# Patient Record
Sex: FEMALE | Race: BLACK OR AFRICAN AMERICAN | NOT HISPANIC OR LATINO | Employment: OTHER | URBAN - METROPOLITAN AREA
[De-identification: names, ages, dates, MRNs, and addresses within clinical notes are randomized per-mention and may not be internally consistent; named-entity substitution may affect disease eponyms.]

---

## 2017-03-23 ENCOUNTER — HISTORICAL (OUTPATIENT)
Dept: ADMINISTRATIVE | Facility: HOSPITAL | Age: 77
End: 2017-03-23

## 2017-05-05 ENCOUNTER — HISTORICAL (OUTPATIENT)
Dept: RADIOLOGY | Facility: HOSPITAL | Age: 77
End: 2017-05-05

## 2017-05-11 ENCOUNTER — HISTORICAL (OUTPATIENT)
Dept: ADMINISTRATIVE | Facility: HOSPITAL | Age: 77
End: 2017-05-11

## 2017-05-17 ENCOUNTER — HISTORICAL (OUTPATIENT)
Dept: ADMINISTRATIVE | Facility: HOSPITAL | Age: 77
End: 2017-05-17

## 2017-05-24 ENCOUNTER — HISTORICAL (OUTPATIENT)
Dept: ADMINISTRATIVE | Facility: HOSPITAL | Age: 77
End: 2017-05-24

## 2017-06-02 ENCOUNTER — HISTORICAL (OUTPATIENT)
Dept: ADMINISTRATIVE | Facility: HOSPITAL | Age: 77
End: 2017-06-02

## 2017-06-09 ENCOUNTER — HISTORICAL (OUTPATIENT)
Dept: ADMINISTRATIVE | Facility: HOSPITAL | Age: 77
End: 2017-06-09

## 2017-06-19 ENCOUNTER — HISTORICAL (OUTPATIENT)
Dept: ADMINISTRATIVE | Facility: HOSPITAL | Age: 77
End: 2017-06-19

## 2017-06-26 ENCOUNTER — HISTORICAL (OUTPATIENT)
Dept: ADMINISTRATIVE | Facility: HOSPITAL | Age: 77
End: 2017-06-26

## 2017-07-03 ENCOUNTER — HISTORICAL (OUTPATIENT)
Dept: ADMINISTRATIVE | Facility: HOSPITAL | Age: 77
End: 2017-07-03

## 2017-07-17 ENCOUNTER — HISTORICAL (OUTPATIENT)
Dept: ADMINISTRATIVE | Facility: HOSPITAL | Age: 77
End: 2017-07-17

## 2017-07-24 ENCOUNTER — HISTORICAL (OUTPATIENT)
Dept: ADMINISTRATIVE | Facility: HOSPITAL | Age: 77
End: 2017-07-24

## 2018-02-06 ENCOUNTER — HISTORICAL (OUTPATIENT)
Dept: ADMINISTRATIVE | Facility: HOSPITAL | Age: 78
End: 2018-02-06

## 2018-03-01 ENCOUNTER — HISTORICAL (OUTPATIENT)
Dept: LAB | Facility: HOSPITAL | Age: 78
End: 2018-03-01

## 2018-03-01 LAB
ABS NEUT (OLG): 1.62 X10(3)/MCL (ref 2.1–9.2)
ALBUMIN SERPL-MCNC: 3.4 GM/DL (ref 3.4–5)
ALBUMIN/GLOB SERPL: 1 {RATIO}
ALP SERPL-CCNC: 105 UNIT/L (ref 45–117)
ALT SERPL-CCNC: 29 UNIT/L (ref 13–56)
AST SERPL-CCNC: 19 UNIT/L (ref 15–37)
BILIRUB SERPL-MCNC: 0.4 MG/DL (ref 0.2–1)
BILIRUBIN DIRECT+TOT PNL SERPL-MCNC: 0.1 MG/DL (ref 0–0.2)
BILIRUBIN DIRECT+TOT PNL SERPL-MCNC: 0.3 MG/DL (ref 0–1)
BUN SERPL-MCNC: 10 MG/DL (ref 7–18)
CALCIUM SERPL-MCNC: 8.8 MG/DL (ref 8.5–10.1)
CHLORIDE SERPL-SCNC: 106 MMOL/L (ref 98–107)
CHOLEST SERPL-MCNC: 128 MG/DL (ref 0–200)
CHOLEST/HDLC SERPL: 1.6 {RATIO} (ref 0–4)
CO2 SERPL-SCNC: 31 MMOL/L (ref 21–32)
CREAT SERPL-MCNC: 0.7 MG/DL (ref 0.55–1.02)
DEPRECATED CALCIDIOL+CALCIFEROL SERPL-MC: 36.7 NG/ML (ref 30–80)
ERYTHROCYTE [DISTWIDTH] IN BLOOD BY AUTOMATED COUNT: 13.7 % (ref 11.5–17)
EST. AVERAGE GLUCOSE BLD GHB EST-MCNC: 131 MG/DL
FT4I SERPL CALC-MCNC: 3.04 (ref 2.6–3.6)
GLOBULIN SER-MCNC: 4 GM/DL (ref 2–4)
GLUCOSE SERPL-MCNC: 101 MG/DL (ref 74–106)
HBA1C MFR BLD: 6.2 % (ref 4.2–6.3)
HCT VFR BLD AUTO: 38.5 % (ref 37–47)
HDLC SERPL-MCNC: 79 MG/DL (ref 35–60)
HGB BLD-MCNC: 12.2 GM/DL (ref 12–16)
LDLC SERPL CALC-MCNC: 39 MG/DL (ref 0–129)
MCH RBC QN AUTO: 28.2 PG (ref 27–31)
MCHC RBC AUTO-ENTMCNC: 31.7 GM/DL (ref 33–36)
MCV RBC AUTO: 88.9 FL (ref 80–94)
PLATELET # BLD AUTO: 225 X10(3)/MCL (ref 130–400)
PMV BLD AUTO: 9.1 FL (ref 7.4–10.4)
POTASSIUM SERPL-SCNC: 4.1 MMOL/L (ref 3.5–5.1)
PROT SERPL-MCNC: 7.5 GM/DL (ref 6.4–8.2)
RBC # BLD AUTO: 4.33 X10(6)/MCL (ref 4.2–5.4)
SODIUM SERPL-SCNC: 141 MMOL/L (ref 136–145)
T3RU NFR SERPL: 33 % (ref 31–39)
T4 SERPL-MCNC: 9.2 MCG/DL (ref 4.7–13.3)
TRIGL SERPL-MCNC: 51 MG/DL (ref 30–150)
TSH SERPL-ACNC: 1.35 MIU/ML (ref 0.36–3.74)
VLDLC SERPL CALC-MCNC: 10 MG/DL
WBC # SPEC AUTO: 4.1 X10(3)/MCL (ref 4.5–11.5)

## 2018-05-01 ENCOUNTER — HISTORICAL (OUTPATIENT)
Dept: RADIOLOGY | Facility: HOSPITAL | Age: 78
End: 2018-05-01

## 2018-05-01 LAB
ABS NEUT (OLG): 1.04 X10(3)/MCL (ref 2.1–9.2)
ALBUMIN SERPL-MCNC: 3.2 GM/DL (ref 3.4–5)
ALBUMIN/GLOB SERPL: 1 {RATIO}
ALP SERPL-CCNC: 109 UNIT/L (ref 45–117)
ALT SERPL-CCNC: 16 UNIT/L (ref 13–56)
AST SERPL-CCNC: 14 UNIT/L (ref 15–37)
BILIRUB SERPL-MCNC: 0.3 MG/DL (ref 0.2–1)
BILIRUBIN DIRECT+TOT PNL SERPL-MCNC: 0.1 MG/DL (ref 0–0.2)
BILIRUBIN DIRECT+TOT PNL SERPL-MCNC: 0.2 MG/DL (ref 0–1)
BUN SERPL-MCNC: 15 MG/DL (ref 7–18)
CALCIUM SERPL-MCNC: 9 MG/DL (ref 8.5–10.1)
CHLORIDE SERPL-SCNC: 104 MMOL/L (ref 98–107)
CO2 SERPL-SCNC: 33 MMOL/L (ref 21–32)
CREAT SERPL-MCNC: 0.98 MG/DL (ref 0.55–1.02)
ERYTHROCYTE [DISTWIDTH] IN BLOOD BY AUTOMATED COUNT: 14.3 % (ref 11.5–17)
GLOBULIN SER-MCNC: 4 GM/DL (ref 2–4)
GLUCOSE SERPL-MCNC: 108 MG/DL (ref 74–106)
HCT VFR BLD AUTO: 30.8 % (ref 37–47)
HGB BLD-MCNC: 9.6 GM/DL (ref 12–16)
MCH RBC QN AUTO: 28.6 PG (ref 27–31)
MCHC RBC AUTO-ENTMCNC: 31.2 GM/DL (ref 33–36)
MCV RBC AUTO: 91.7 FL (ref 80–94)
PLATELET # BLD AUTO: 191 X10(3)/MCL (ref 130–400)
PMV BLD AUTO: 8.3 FL (ref 7.4–10.4)
POTASSIUM SERPL-SCNC: 3.8 MMOL/L (ref 3.5–5.1)
PROT SERPL-MCNC: 6.9 GM/DL (ref 6.4–8.2)
RBC # BLD AUTO: 3.36 X10(6)/MCL (ref 4.2–5.4)
SODIUM SERPL-SCNC: 143 MMOL/L (ref 136–145)
WBC # SPEC AUTO: 3.4 X10(3)/MCL (ref 4.5–11.5)

## 2018-09-13 ENCOUNTER — HISTORICAL (OUTPATIENT)
Dept: LAB | Facility: HOSPITAL | Age: 78
End: 2018-09-13

## 2018-09-13 LAB
ABS NEUT (OLG): 1.48 X10(3)/MCL (ref 2.1–9.2)
ALBUMIN SERPL-MCNC: 3.1 GM/DL (ref 3.4–5)
ALBUMIN/GLOB SERPL: 1 {RATIO}
ALP SERPL-CCNC: 96 UNIT/L (ref 45–117)
ALT SERPL-CCNC: 23 UNIT/L (ref 13–56)
AST SERPL-CCNC: 19 UNIT/L (ref 15–37)
BILIRUB SERPL-MCNC: 0.4 MG/DL (ref 0.2–1)
BILIRUBIN DIRECT+TOT PNL SERPL-MCNC: <0.1 MG/DL (ref 0–0.2)
BILIRUBIN DIRECT+TOT PNL SERPL-MCNC: >0.3 MG/DL (ref 0–1)
BUN SERPL-MCNC: 16 MG/DL (ref 7–18)
CALCIUM SERPL-MCNC: 8.5 MG/DL (ref 8.5–10.1)
CHLORIDE SERPL-SCNC: 104 MMOL/L (ref 98–107)
CO2 SERPL-SCNC: 32 MMOL/L (ref 21–32)
CREAT SERPL-MCNC: 0.94 MG/DL (ref 0.55–1.02)
ERYTHROCYTE [DISTWIDTH] IN BLOOD BY AUTOMATED COUNT: 13.8 % (ref 11.5–17)
GLOBULIN SER-MCNC: 4 GM/DL (ref 2–4)
GLUCOSE SERPL-MCNC: 108 MG/DL (ref 74–106)
HCT VFR BLD AUTO: 36.3 % (ref 37–47)
HGB BLD-MCNC: 11.1 GM/DL (ref 12–16)
MCH RBC QN AUTO: 27.7 PG (ref 27–31)
MCHC RBC AUTO-ENTMCNC: 30.6 GM/DL (ref 33–36)
MCV RBC AUTO: 90.5 FL (ref 80–94)
PLATELET # BLD AUTO: 191 X10(3)/MCL (ref 130–400)
PMV BLD AUTO: 8.7 FL (ref 7.4–10.4)
POTASSIUM SERPL-SCNC: 3.7 MMOL/L (ref 3.5–5.1)
PROT SERPL-MCNC: 7 GM/DL (ref 6.4–8.2)
RBC # BLD AUTO: 4.01 X10(6)/MCL (ref 4.2–5.4)
SODIUM SERPL-SCNC: 142 MMOL/L (ref 136–145)
WBC # SPEC AUTO: 3.9 X10(3)/MCL (ref 4.5–11.5)

## 2018-09-18 ENCOUNTER — HISTORICAL (OUTPATIENT)
Dept: ENDOSCOPY | Facility: HOSPITAL | Age: 78
End: 2018-09-18

## 2020-01-19 ENCOUNTER — HISTORICAL (OUTPATIENT)
Dept: PREADMISSION TESTING | Facility: HOSPITAL | Age: 80
End: 2020-01-19

## 2020-10-07 ENCOUNTER — HISTORICAL (OUTPATIENT)
Dept: ADMINISTRATIVE | Facility: HOSPITAL | Age: 80
End: 2020-10-07

## 2020-10-07 LAB
ABS NEUT (OLG): 3.08 X10(3)/MCL (ref 2.1–9.2)
ALBUMIN SERPL-MCNC: 3.4 GM/DL (ref 3.4–4.8)
ALBUMIN/GLOB SERPL: 1 RATIO (ref 1.1–2)
ALP SERPL-CCNC: 84 UNIT/L (ref 40–150)
ALT SERPL-CCNC: 27 UNIT/L (ref 0–55)
AST SERPL-CCNC: 22 UNIT/L (ref 5–34)
BASOPHILS # BLD AUTO: 0.02 X10(3)/MCL (ref 0–0.2)
BASOPHILS NFR BLD AUTO: 0.3 % (ref 0–1)
BILIRUB SERPL-MCNC: 0.4 MG/DL (ref 0.2–1.2)
BILIRUBIN DIRECT+TOT PNL SERPL-MCNC: 0.2 MG/DL (ref 0–0.5)
BILIRUBIN DIRECT+TOT PNL SERPL-MCNC: 0.2 MG/DL (ref 0–0.8)
BUN SERPL-MCNC: 12.9 MG/DL (ref 9.8–20.1)
CALCIUM SERPL-MCNC: 9.4 MG/DL (ref 8.4–10.2)
CHLORIDE SERPL-SCNC: 99 MMOL/L (ref 98–107)
CO2 SERPL-SCNC: 31 MMOL/L (ref 23–31)
CREAT SERPL-MCNC: 0.81 MG/DL (ref 0.57–1.11)
EOSINOPHIL # BLD AUTO: 0.15 X10(3)/MCL (ref 0–0.9)
EOSINOPHIL NFR BLD AUTO: 2.5 % (ref 0–6.4)
ERYTHROCYTE [DISTWIDTH] IN BLOOD BY AUTOMATED COUNT: 14.2 % (ref 11.5–17)
GLOBULIN SER-MCNC: 3.5 GM/DL (ref 2.4–3.5)
GLUCOSE SERPL-MCNC: 105 MG/DL (ref 82–115)
HCT VFR BLD AUTO: 38.8 % (ref 37–47)
HGB BLD-MCNC: 12.1 GM/DL (ref 12–16)
IMM GRANULOCYTES # BLD AUTO: 0.01 10*3/UL (ref 0–0.02)
IMM GRANULOCYTES NFR BLD AUTO: 0.2 % (ref 0–0.43)
LYMPHOCYTES # BLD AUTO: 2.11 X10(3)/MCL (ref 0.6–4.6)
LYMPHOCYTES NFR BLD AUTO: 34.5 % (ref 16–44)
MCH RBC QN AUTO: 29 PG (ref 27–31)
MCHC RBC AUTO-ENTMCNC: 31.2 GM/DL (ref 33–36)
MCV RBC AUTO: 93 FL (ref 80–94)
MONOCYTES # BLD AUTO: 0.74 X10(3)/MCL (ref 0.1–1.3)
MONOCYTES NFR BLD AUTO: 12.1 % (ref 4–12.1)
NEUTROPHILS # BLD AUTO: 3.08 X10(3)/MCL (ref 2.1–9.2)
NEUTROPHILS NFR BLD AUTO: 50.4 % (ref 43–73)
NRBC BLD AUTO-RTO: 0 % (ref 0–0.2)
PLATELET # BLD AUTO: 218 X10(3)/MCL (ref 130–400)
PMV BLD AUTO: 9 FL (ref 7.4–10.4)
POTASSIUM SERPL-SCNC: 4.6 MMOL/L (ref 3.5–5.1)
PROT SERPL-MCNC: 6.9 GM/DL (ref 5.8–7.6)
RBC # BLD AUTO: 4.17 X10(6)/MCL (ref 4.2–5.4)
SODIUM SERPL-SCNC: 138 MMOL/L (ref 136–145)
WBC # SPEC AUTO: 6.1 X10(3)/MCL (ref 4.5–11.5)

## 2021-08-06 LAB
ABS NEUT (OLG): 2.01 X10(3)/MCL (ref 2.1–9.2)
BASOPHILS # BLD AUTO: 0 X10(3)/MCL (ref 0–0.2)
BASOPHILS NFR BLD AUTO: 0 %
EOSINOPHIL # BLD AUTO: 0.2 X10(3)/MCL (ref 0–0.9)
EOSINOPHIL NFR BLD AUTO: 4 %
ERYTHROCYTE [DISTWIDTH] IN BLOOD BY AUTOMATED COUNT: 13.8 % (ref 11.5–17)
HCT VFR BLD AUTO: 38.5 % (ref 37–47)
HGB BLD-MCNC: 11.7 GM/DL (ref 12–16)
LYMPHOCYTES # BLD AUTO: 1.7 X10(3)/MCL (ref 0.6–4.6)
LYMPHOCYTES NFR BLD AUTO: 38 %
MCH RBC QN AUTO: 28.1 PG (ref 27–31)
MCHC RBC AUTO-ENTMCNC: 30.4 GM/DL (ref 33–36)
MCV RBC AUTO: 92.5 FL (ref 80–94)
MONOCYTES # BLD AUTO: 0.6 X10(3)/MCL (ref 0.1–1.3)
MONOCYTES NFR BLD AUTO: 12 %
NEUTROPHILS # BLD AUTO: 2.01 X10(3)/MCL (ref 2.1–9.2)
NEUTROPHILS NFR BLD AUTO: 45 %
PLATELET # BLD AUTO: 289 X10(3)/MCL (ref 130–400)
PMV BLD AUTO: 9.5 FL (ref 9.4–12.4)
RBC # BLD AUTO: 4.16 X10(6)/MCL (ref 4.2–5.4)
SARS-COV-2 AG RESP QL IA.RAPID: NEGATIVE
WBC # SPEC AUTO: 4.5 X10(3)/MCL (ref 4.5–11.5)

## 2021-08-10 ENCOUNTER — HISTORICAL (OUTPATIENT)
Dept: PREADMISSION TESTING | Facility: HOSPITAL | Age: 81
End: 2021-08-10

## 2021-10-04 ENCOUNTER — HISTORICAL (OUTPATIENT)
Dept: ADMINISTRATIVE | Facility: HOSPITAL | Age: 81
End: 2021-10-04

## 2021-10-04 LAB
ABS NEUT (OLG): 1.92 X10(3)/MCL (ref 2.1–9.2)
ALBUMIN SERPL-MCNC: 3.3 GM/DL (ref 3.4–4.8)
ALBUMIN/GLOB SERPL: 1 RATIO (ref 1.1–2)
ALP SERPL-CCNC: 102 UNIT/L (ref 40–150)
ALT SERPL-CCNC: 15 UNIT/L (ref 0–55)
AST SERPL-CCNC: 16 UNIT/L (ref 5–34)
BASOPHILS # BLD AUTO: 0 X10(3)/MCL (ref 0–0.2)
BASOPHILS NFR BLD AUTO: 0 %
BILIRUB SERPL-MCNC: 0.6 MG/DL
BILIRUBIN DIRECT+TOT PNL SERPL-MCNC: 0.2 MG/DL (ref 0–0.5)
BILIRUBIN DIRECT+TOT PNL SERPL-MCNC: 0.4 MG/DL (ref 0–0.8)
BUN SERPL-MCNC: 11 MG/DL (ref 9.8–20.1)
CALCIUM SERPL-MCNC: 9 MG/DL (ref 8.4–10.2)
CHLORIDE SERPL-SCNC: 99 MMOL/L (ref 98–107)
CHOLEST SERPL-MCNC: 157 MG/DL
CHOLEST/HDLC SERPL: 3 {RATIO} (ref 0–5)
CO2 SERPL-SCNC: 31 MMOL/L (ref 23–31)
CREAT SERPL-MCNC: 0.79 MG/DL (ref 0.55–1.02)
DEPRECATED CALCIDIOL+CALCIFEROL SERPL-MC: 41.6 NG/ML (ref 30–80)
DIGOXIN SERPL-MCNC: <0.19 NG/ML (ref 0.8–2)
EOSINOPHIL # BLD AUTO: 0.1 X10(3)/MCL (ref 0–0.9)
EOSINOPHIL NFR BLD AUTO: 2 %
ERYTHROCYTE [DISTWIDTH] IN BLOOD BY AUTOMATED COUNT: 14.5 % (ref 11.5–17)
GLOBULIN SER-MCNC: 3.2 GM/DL (ref 2.4–3.5)
GLUCOSE SERPL-MCNC: 102 MG/DL (ref 82–115)
HCT VFR BLD AUTO: 35.6 % (ref 37–47)
HDLC SERPL-MCNC: 60 MG/DL (ref 35–60)
HGB BLD-MCNC: 11.3 GM/DL (ref 12–16)
INR PPP: 1.3 (ref 0–1.3)
LDLC SERPL CALC-MCNC: 82 MG/DL (ref 50–140)
LYMPHOCYTES # BLD AUTO: 1.9 X10(3)/MCL (ref 0.6–4.6)
LYMPHOCYTES NFR BLD AUTO: 43 %
MCH RBC QN AUTO: 28.8 PG (ref 27–31)
MCHC RBC AUTO-ENTMCNC: 31.7 GM/DL (ref 33–36)
MCV RBC AUTO: 90.6 FL (ref 80–94)
MONOCYTES # BLD AUTO: 0.4 X10(3)/MCL (ref 0.1–1.3)
MONOCYTES NFR BLD AUTO: 10 %
NEUTROPHILS # BLD AUTO: 1.92 X10(3)/MCL (ref 2.1–9.2)
NEUTROPHILS NFR BLD AUTO: 44 %
PLATELET # BLD AUTO: 254 X10(3)/MCL (ref 130–400)
PMV BLD AUTO: 9.5 FL (ref 9.4–12.4)
POTASSIUM SERPL-SCNC: 3.2 MMOL/L (ref 3.5–5.1)
PROT SERPL-MCNC: 6.5 GM/DL (ref 5.8–7.6)
PROTHROMBIN TIME: 15.4 SECOND(S) (ref 12.5–14.5)
RBC # BLD AUTO: 3.93 X10(6)/MCL (ref 4.2–5.4)
SODIUM SERPL-SCNC: 143 MMOL/L (ref 136–145)
TRIGL SERPL-MCNC: 77 MG/DL (ref 37–140)
TSH SERPL-ACNC: 1.4 UIU/ML (ref 0.35–4.94)
VLDLC SERPL CALC-MCNC: 15 MG/DL
WBC # SPEC AUTO: 4.4 X10(3)/MCL (ref 4.5–11.5)

## 2022-01-19 ENCOUNTER — HISTORICAL (OUTPATIENT)
Dept: PREADMISSION TESTING | Facility: HOSPITAL | Age: 82
End: 2022-01-19

## 2022-01-19 LAB
ABS NEUT (OLG): 2 X10(3)/MCL (ref 2.1–9.2)
ALBUMIN SERPL-MCNC: 3.2 GM/DL (ref 3.4–4.8)
ALBUMIN/GLOB SERPL: 1 RATIO (ref 1.1–2)
ALP SERPL-CCNC: 103 UNIT/L (ref 40–150)
ALT SERPL-CCNC: 15 UNIT/L (ref 0–55)
AST SERPL-CCNC: 16 UNIT/L (ref 5–34)
BASOPHILS # BLD AUTO: 0 X10(3)/MCL (ref 0–0.2)
BASOPHILS NFR BLD AUTO: 0 %
BILIRUB SERPL-MCNC: 0.4 MG/DL
BILIRUBIN DIRECT+TOT PNL SERPL-MCNC: 0.2 MG/DL (ref 0–0.5)
BILIRUBIN DIRECT+TOT PNL SERPL-MCNC: 0.2 MG/DL (ref 0–0.8)
BUN SERPL-MCNC: 13 MG/DL (ref 9.8–20.1)
CALCIUM SERPL-MCNC: 9.1 MG/DL (ref 8.7–10.5)
CHLORIDE SERPL-SCNC: 103 MMOL/L (ref 98–107)
CO2 SERPL-SCNC: 30 MMOL/L (ref 23–31)
CREAT SERPL-MCNC: 0.78 MG/DL (ref 0.55–1.02)
EOSINOPHIL # BLD AUTO: 0.1 X10(3)/MCL (ref 0–0.9)
EOSINOPHIL NFR BLD AUTO: 2 %
ERYTHROCYTE [DISTWIDTH] IN BLOOD BY AUTOMATED COUNT: 14.3 % (ref 11.5–17)
GLOBULIN SER-MCNC: 3.1 GM/DL (ref 2.4–3.5)
GLUCOSE SERPL-MCNC: 103 MG/DL (ref 82–115)
HCT VFR BLD AUTO: 36.2 % (ref 37–47)
HGB BLD-MCNC: 11 GM/DL (ref 12–16)
LYMPHOCYTES # BLD AUTO: 1.5 X10(3)/MCL (ref 0.6–4.6)
LYMPHOCYTES NFR BLD AUTO: 37 %
MCH RBC QN AUTO: 28.4 PG (ref 27–31)
MCHC RBC AUTO-ENTMCNC: 30.4 GM/DL (ref 33–36)
MCV RBC AUTO: 93.3 FL (ref 80–94)
MONOCYTES # BLD AUTO: 0.5 X10(3)/MCL (ref 0.1–1.3)
MONOCYTES NFR BLD AUTO: 12 %
NEUTROPHILS # BLD AUTO: 2 X10(3)/MCL (ref 2.1–9.2)
NEUTROPHILS NFR BLD AUTO: 49 %
PLATELET # BLD AUTO: 238 X10(3)/MCL (ref 130–400)
PMV BLD AUTO: 9.2 FL (ref 9.4–12.4)
POTASSIUM SERPL-SCNC: 4.6 MMOL/L (ref 3.5–5.1)
PROT SERPL-MCNC: 6.3 GM/DL (ref 5.8–7.6)
RBC # BLD AUTO: 3.88 X10(6)/MCL (ref 4.2–5.4)
SARS-COV-2 RNA RESP QL NAA+PROBE: NOT DETECTED
SODIUM SERPL-SCNC: 141 MMOL/L (ref 136–145)
WBC # SPEC AUTO: 4.1 X10(3)/MCL (ref 4.5–11.5)

## 2022-01-21 ENCOUNTER — HISTORICAL (OUTPATIENT)
Dept: SURGERY | Facility: HOSPITAL | Age: 82
End: 2022-01-21

## 2022-02-23 ENCOUNTER — HISTORICAL (OUTPATIENT)
Dept: LAB | Facility: HOSPITAL | Age: 82
End: 2022-02-23

## 2022-02-23 LAB
ABS NEUT (OLG): 2.32 (ref 2.1–9.2)
BASOPHILS # BLD AUTO: 0.02 10*3/UL (ref 0–0.2)
BASOPHILS NFR BLD AUTO: 0.4 % (ref 0–1)
EOSINOPHIL # BLD AUTO: 0.12 10*3/UL (ref 0–0.9)
EOSINOPHIL NFR BLD AUTO: 2.5 % (ref 0–6.4)
ERYTHROCYTE [DISTWIDTH] IN BLOOD BY AUTOMATED COUNT: 14.3 % (ref 11.5–17)
HCT VFR BLD AUTO: 34.8 % (ref 37–47)
HGB BLD-MCNC: 10.8 G/DL (ref 12–16)
IMM GRANULOCYTES # BLD AUTO: 0 10*3/UL (ref 0–0.02)
IMM GRANULOCYTES NFR BLD AUTO: 0 % (ref 0–0.43)
LYMPHOCYTES # BLD AUTO: 1.82 10*3/UL (ref 0.6–4.6)
LYMPHOCYTES NFR BLD AUTO: 38.4 % (ref 16–44)
MANUAL DIFF? (OHS): NO
MCH RBC QN AUTO: 28.6 PG (ref 27–31)
MCHC RBC AUTO-ENTMCNC: 31 G/DL (ref 33–36)
MCV RBC AUTO: 92.3 FL (ref 80–94)
MONOCYTES # BLD AUTO: 0.46 10*3/UL (ref 0.1–1.3)
MONOCYTES NFR BLD AUTO: 9.7 % (ref 4–12.1)
NEUTROPHILS # BLD AUTO: 2.32 10*3/UL (ref 2.1–9.2)
NEUTROPHILS NFR BLD AUTO: 49 % (ref 43–73)
NRBC BLD AUTO-RTO: 0 % (ref 0–0.2)
PLATELET # BLD AUTO: 237 10*3/UL (ref 130–400)
PMV BLD AUTO: 8.5 FL (ref 7.4–10.4)
RBC # BLD AUTO: 3.77 10*6/UL (ref 4.2–5.4)
WBC # SPEC AUTO: 4.7 10*3/UL (ref 4.5–11.5)

## 2022-04-11 ENCOUNTER — HISTORICAL (OUTPATIENT)
Dept: ADMINISTRATIVE | Facility: HOSPITAL | Age: 82
End: 2022-04-11
Payer: MEDICARE

## 2022-04-27 VITALS
BODY MASS INDEX: 44.14 KG/M2 | DIASTOLIC BLOOD PRESSURE: 85 MMHG | HEIGHT: 63 IN | WEIGHT: 249.13 LBS | SYSTOLIC BLOOD PRESSURE: 153 MMHG

## 2022-04-30 NOTE — OP NOTE
Patient:   Adriana Murphy            MRN: [id number]            FIN: [id number]               Age:   78 years     Sex:  Female     :  1940   Associated Diagnoses:   None   Author:   Miguel Palomo MD      Operative Note   Operative Information   Date/ Time:  2018 10:26:00.     Procedures Performed: EGD with biopsy.     Preoperative Diagnosis: nausea, heartburn, bloating.     Postoperative Diagnosis: 1.  Hiatal hernia, small, otherwise normal exam  2.  CLOtest was done.  No bloating and nausea..     Surgeon: Miguel Palomo MD.     Assistant: GI staff.     Anesthesia: per anaesthesia service.     Speciman Removed: none.     Esimated blood loss: loss  3  cc.     Description of Procedure/Findings/    Complications: History and physical as per pre-operative note. Informed consent was obtained. Patient was placed in left lateral position. Sedation per anaesthesia service. Olympus video gastroscope was introduced into the oral cavity and esophagus was intubated under direct visuaization. The scope was carefully advanced to the distal duodenum. The patient tolerated the procedure well without any complications. .     Findings: esophagus: Normal mucosa.  GE junction at approximately 35 cm.  Small hiatal hernia.  Stomach: Fundus, cardia, body and antrum appeared normal.  CLOtest was done in view of history of nausea and bloating.  Duodenum: Duodenal bulb, 2nd and 3rd portion of the duodenum appeared unremarkable to the extent of exam..     Complications: None.     recommendations:  1.  Follow biopsy results.  2.  Antireflux measures.  3.  H2 blockers or PPI as needed.  4.  Colonoscopy to follow    c.c.;  Dr. Jelani Birmingham.  Dr. Anil Fernandez.

## 2022-04-30 NOTE — OP NOTE
Patient:   Adriana Murphy            MRN: [id number]            FIN: [id number]               Age:   78 years     Sex:  Female     :  1940   Associated Diagnoses:   None   Author:   Miguel Palomo MD      Operative Note   Operative Information   Date/ Time:  2018 11:04:00.     Procedures Performed: Colonoscopy, diagnostic.     Preoperative Diagnosis: history of rectal bleeding..     Postoperative Diagnosis: internal hemorrhoids, grade 1-2.  Otherwise normal exam.  Preparation was fair.     Surgeon: Miguel Palomo MD.     Assistant: GI staff.     Anesthesia: per anaesthesia service.     Esimated blood loss: No blood loss.     Description of Procedure/Findings/    Complications: History and physical as per pre-operative note. Informed consent was obtained. Patient was placed in left lateral position. Sedation per anaesthesia service. Rectal exam was unremarkable. Olympus video colonoscope was introduced into the rectum and was carefully advanced to the cecum. The quality of the preparation was fair. Periodic irrigation was done throughout the procedure for better visualization. Patient tolerated the procedure well without any complications..     Findings: cecum, ascending colon, transverse colon, descending colon, sigmoid colon and rectum appeared unremarkable except grade 1-2 internal hemorrhoids noted on withdrawal of the scope.  Periodic irrigation was done throughout the procedure for better visualization.  Estimated withdrawal time from cecum to rectum was more than 12 minutes..     Complications: None.     recommendations:  1.  Diet as tolerated.  2.  Can resume Plavix from GI standpoint.  3.  Consider surgery evaluation for hemorrhoids if rectal bleeding recurs.    c.c.: Dr. Jelani Fernandez.

## 2022-05-14 NOTE — OP NOTE
DATE OF SURGERY:    01/21/2022    SURGEON:  Mick Frias MD    PREOPERATIVE DIAGNOSIS:  Postmenopausal bleeding.    POSTOPERATIVE DIAGNOSIS:  Postmenopausal bleeding.    ASSISTANT:  None.    PROCEDURE:  Hysteroscopy, D and C, with the MyoSure device.    FINDINGS:  Fluffy irregular endometrium, irregular contour of the uterus, unable to visualize bilateral ostia.    INDICATIONS FOR PROCEDURE:  Patient is a 81-year-old female who presented to the office for postmenopausal bleeding.  Had a negative endometrial biopsy, but a significantly thickened endometrial stripe of over 2 cm.  Risks and benefits of further sampling were discussed with her as she continued to have daily bleeding, and she desired to move forward with the procedure.    PROCEDURE IN DETAIL:  Patient was taken to the operating room with IV fluids running.  She was placed in a dorsal lithotomy position after managed anesthesia care was administered.  She was prepped and draped in the usual sterile fashion.  A single-tooth tenaculum was then placed on the anterior lip of the cervix.  The uterus was sounded to about 7 cm.  The cervix was serially dilated with Hegar dilators.  Next, the MyoSure device was then inserted and the above-mentioned findings noted.  The MyoSure device was then used to remove endometrial tissue.  This was then removed.  There was possibly a malfunction of the MyoSure device as deficit was recorded as 400, but the device was only used for about 30 seconds, so this was unlikely.  The patient remained stable throughout the case.  I did recheck the endometrial cavity to make sure there were no obvious defects with the hysteroscope.  After this, I did just a D and C, and that     tissue will be sent off to the pathologist.  The patient tolerated the procedure well.  All sponge, needle, lap counts were correct x2.  Estimated blood loss 5 cc.        ______________________________  Mick Frias MD    /US  DD:  01/21/2022   Time:  07:46AM  DT:  01/21/2022  Time:  07:59AM  Job #:  [id number]

## 2022-08-17 ENCOUNTER — HOSPITAL ENCOUNTER (EMERGENCY)
Facility: HOSPITAL | Age: 82
Discharge: HOME | End: 2022-08-17
Attending: EMERGENCY MEDICINE
Payer: MEDICARE

## 2022-08-17 VITALS
SYSTOLIC BLOOD PRESSURE: 132 MMHG | RESPIRATION RATE: 18 BRPM | OXYGEN SATURATION: 98 % | DIASTOLIC BLOOD PRESSURE: 75 MMHG | WEIGHT: 276 LBS | HEART RATE: 68 BPM | TEMPERATURE: 99 F | HEIGHT: 64 IN | BODY MASS INDEX: 47.12 KG/M2

## 2022-08-17 DIAGNOSIS — N95.0 POSTMENOPAUSAL BLEEDING: ICD-10-CM

## 2022-08-17 DIAGNOSIS — N93.8 DYSFUNCTIONAL UTERINE BLEEDING: Primary | ICD-10-CM

## 2022-08-17 LAB
ALBUMIN SERPL-MCNC: 3.2 GM/DL (ref 3.4–4.8)
ALBUMIN/GLOB SERPL: 0.8 RATIO (ref 1.1–2)
ALP SERPL-CCNC: 104 UNIT/L (ref 40–150)
ALT SERPL-CCNC: 16 UNIT/L (ref 0–55)
AST SERPL-CCNC: 17 UNIT/L (ref 5–34)
BASOPHILS # BLD AUTO: 0.02 X10(3)/MCL (ref 0–0.2)
BASOPHILS NFR BLD AUTO: 0.4 %
BILIRUBIN DIRECT+TOT PNL SERPL-MCNC: 0.3 MG/DL
BUN SERPL-MCNC: 11.5 MG/DL (ref 9.8–20.1)
CALCIUM SERPL-MCNC: 9.2 MG/DL (ref 8.4–10.2)
CHLORIDE SERPL-SCNC: 104 MMOL/L (ref 98–107)
CO2 SERPL-SCNC: 28 MMOL/L (ref 23–31)
CREAT SERPL-MCNC: 0.8 MG/DL (ref 0.55–1.02)
EOSINOPHIL # BLD AUTO: 0.15 X10(3)/MCL (ref 0–0.9)
EOSINOPHIL NFR BLD AUTO: 3 %
ERYTHROCYTE [DISTWIDTH] IN BLOOD BY AUTOMATED COUNT: 14.2 % (ref 11.5–17)
GFR SERPLBLD CREATININE-BSD FMLA CKD-EPI: >60 MLS/MIN/1.73/M2
GLOBULIN SER-MCNC: 3.8 GM/DL (ref 2.4–3.5)
GLUCOSE SERPL-MCNC: 108 MG/DL (ref 82–115)
HCT VFR BLD AUTO: 36.9 % (ref 37–47)
HGB BLD-MCNC: 10.9 GM/DL (ref 12–16)
IMM GRANULOCYTES # BLD AUTO: 0.01 X10(3)/MCL (ref 0–0.04)
IMM GRANULOCYTES NFR BLD AUTO: 0.2 %
LYMPHOCYTES # BLD AUTO: 1.84 X10(3)/MCL (ref 0.6–4.6)
LYMPHOCYTES NFR BLD AUTO: 36.9 %
MCH RBC QN AUTO: 27.3 PG (ref 27–31)
MCHC RBC AUTO-ENTMCNC: 29.5 MG/DL (ref 33–36)
MCV RBC AUTO: 92.5 FL (ref 80–94)
MONOCYTES # BLD AUTO: 0.56 X10(3)/MCL (ref 0.1–1.3)
MONOCYTES NFR BLD AUTO: 11.2 %
NEUTROPHILS # BLD AUTO: 2.4 X10(3)/MCL (ref 2.1–9.2)
NEUTROPHILS NFR BLD AUTO: 48.3 %
NRBC BLD AUTO-RTO: 0 %
PLATELET # BLD AUTO: 222 X10(3)/MCL (ref 130–400)
PMV BLD AUTO: 9.7 FL (ref 7.4–10.4)
POTASSIUM SERPL-SCNC: 3.5 MMOL/L (ref 3.5–5.1)
PROT SERPL-MCNC: 7 GM/DL (ref 5.8–7.6)
RBC # BLD AUTO: 3.99 X10(6)/MCL (ref 4.2–5.4)
SODIUM SERPL-SCNC: 141 MMOL/L (ref 136–145)
WBC # SPEC AUTO: 5 X10(3)/MCL (ref 4.5–11.5)

## 2022-08-17 PROCEDURE — 96375 TX/PRO/DX INJ NEW DRUG ADDON: CPT

## 2022-08-17 PROCEDURE — 63600175 PHARM REV CODE 636 W HCPCS: Performed by: EMERGENCY MEDICINE

## 2022-08-17 PROCEDURE — 85025 COMPLETE CBC W/AUTO DIFF WBC: CPT | Performed by: EMERGENCY MEDICINE

## 2022-08-17 PROCEDURE — 80053 COMPREHEN METABOLIC PANEL: CPT | Performed by: EMERGENCY MEDICINE

## 2022-08-17 PROCEDURE — 99284 EMERGENCY DEPT VISIT MOD MDM: CPT | Mod: 25

## 2022-08-17 PROCEDURE — 36415 COLL VENOUS BLD VENIPUNCTURE: CPT | Performed by: EMERGENCY MEDICINE

## 2022-08-17 PROCEDURE — 96374 THER/PROPH/DIAG INJ IV PUSH: CPT

## 2022-08-17 PROCEDURE — 25000003 PHARM REV CODE 250: Performed by: EMERGENCY MEDICINE

## 2022-08-17 RX ORDER — ONDANSETRON 2 MG/ML
INJECTION INTRAMUSCULAR; INTRAVENOUS
Status: DISCONTINUED
Start: 2022-08-17 | End: 2022-08-17 | Stop reason: HOSPADM

## 2022-08-17 RX ORDER — ONDANSETRON 2 MG/ML
4 INJECTION INTRAMUSCULAR; INTRAVENOUS
Status: COMPLETED | OUTPATIENT
Start: 2022-08-17 | End: 2022-08-17

## 2022-08-17 RX ORDER — MEDROXYPROGESTERONE ACETATE 10 MG/1
5 TABLET ORAL DAILY
Qty: 15 TABLET | Refills: 2 | Status: ON HOLD | OUTPATIENT
Start: 2022-08-17 | End: 2022-11-16 | Stop reason: HOSPADM

## 2022-08-17 RX ORDER — MEDROXYPROGESTERONE ACETATE 2.5 MG/1
5 TABLET ORAL
Status: COMPLETED | OUTPATIENT
Start: 2022-08-17 | End: 2022-08-17

## 2022-08-17 RX ORDER — KETOROLAC TROMETHAMINE 30 MG/ML
15 INJECTION, SOLUTION INTRAMUSCULAR; INTRAVENOUS
Status: COMPLETED | OUTPATIENT
Start: 2022-08-17 | End: 2022-08-17

## 2022-08-17 RX ADMIN — KETOROLAC TROMETHAMINE 15 MG: 30 INJECTION, SOLUTION INTRAMUSCULAR at 08:08

## 2022-08-17 RX ADMIN — MEDROXYPROGESTERONE ACETATE 5 MG: 2.5 TABLET ORAL at 08:08

## 2022-08-17 RX ADMIN — ONDANSETRON 4 MG: 2 INJECTION INTRAMUSCULAR; INTRAVENOUS at 09:08

## 2022-08-17 NOTE — ED PROVIDER NOTES
Encounter Date: 8/17/2022       History     Chief Complaint   Patient presents with    Vaginal Bleeding     Presents with intermittent vaginal bleeding x1 year. Worse over the past 4 days. Pt states that she has to change underwear every 2-3 hours over the past few days. C/O right flank pain     82-year-old female presents to the emergency department for evaluation of recurrent vaginal bleeding.  States this has been on ongoing issue for over a year.  She has previously undergone a D&C with Dr. Odell Frias January and this year, was placed on Provera and had a temporary alleviation in the beginning; however, has had intermittent episodes since that time.  She is on Plavix for some cardiac and peripheral stents.  She was previously on Eliquis but this was stopped after an IVC filter was placed in April.  She reports that the bleeding became heavy again about 3 or 4 days ago and has been passing clots at home.  She denies any chest pain or shortness of breath.  Denies any lightheadedness, dizziness, or fainting.     The history is provided by the patient.   Vaginal Bleeding  This is a recurrent problem. The current episode started more than 2 days ago. The problem occurs hourly. Pertinent negatives include no chest pain, no abdominal pain and no shortness of breath. Nothing aggravates the symptoms. Nothing relieves the symptoms.     Review of patient's allergies indicates:  Not on File  Past Medical History:   Diagnosis Date    PAD (peripheral artery disease)     Post-menopausal bleeding     Pulmonary embolus      Past Surgical History:   Procedure Laterality Date    DILATION AND CURETTAGE OF UTERUS      ivc filter       History reviewed. No pertinent family history.     Review of Systems   Constitutional: Negative for fatigue and fever.   Respiratory: Negative for shortness of breath.    Cardiovascular: Negative for chest pain and palpitations.   Gastrointestinal: Negative for abdominal pain, nausea and  vomiting.   Genitourinary: Positive for vaginal bleeding.   Neurological: Negative for weakness and light-headedness.   All other systems reviewed and are negative.      Physical Exam     Initial Vitals [08/17/22 0459]   BP Pulse Resp Temp SpO2   (!) 160/72 92 16 98.6 °F (37 °C) 99 %      MAP       --         Physical Exam    Nursing note and vitals reviewed.  Constitutional: She appears well-developed and well-nourished. She is not diaphoretic. No distress.   HENT:   Head: Normocephalic.   Mouth/Throat: Oropharynx is clear and moist.   Eyes: Conjunctivae are normal. Pupils are equal, round, and reactive to light.   Neck:   Normal range of motion.  Cardiovascular: Normal rate, regular rhythm and normal heart sounds.   Pulmonary/Chest: Breath sounds normal. No respiratory distress.   Abdominal: Abdomen is soft. She exhibits no distension. There is no abdominal tenderness.   Genitourinary:    Genitourinary Comments: Small amount of BRB in vaginal vault, no clots     Musculoskeletal:      Cervical back: Normal range of motion.     Neurological: She is alert and oriented to person, place, and time. GCS score is 15. GCS eye subscore is 4. GCS verbal subscore is 5. GCS motor subscore is 6.   Skin: Skin is warm and dry.         ED Course   Procedures  Labs Reviewed   COMPREHENSIVE METABOLIC PANEL - Abnormal; Notable for the following components:       Result Value    Albumin Level 3.2 (*)     Globulin 3.8 (*)     Albumin/Globulin Ratio 0.8 (*)     All other components within normal limits   CBC WITH DIFFERENTIAL - Abnormal; Notable for the following components:    RBC 3.99 (*)     Hgb 10.9 (*)     Hct 36.9 (*)     MCHC 29.5 (*)     All other components within normal limits   CBC W/ AUTO DIFFERENTIAL    Narrative:     The following orders were created for panel order CBC auto differential.  Procedure                               Abnormality         Status                     ---------                                -----------         ------                     CBC with Differential[[id number]]        Abnormal            Final result                 Please view results for these tests on the individual orders.   EXTRA TUBES    Narrative:     The following orders were created for panel order EXTRA TUBES.  Procedure                               Abnormality         Status                     ---------                               -----------         ------                     Light Blue Top Hold[[id number]]                              In process                   Please view results for these tests on the individual orders.   LIGHT BLUE TOP HOLD          Imaging Results    None          Medications   ketorolac injection 15 mg (15 mg Intravenous Given 8/17/22 0800)   medroxyPROGESTERone tablet 5 mg (5 mg Oral Given 8/17/22 0815)   ondansetron injection 4 mg (4 mg Intravenous Given 8/17/22 0915)     Medical Decision Making:   Initial Assessment:   Mrs. Murphy has a history of postmenopausal bleeding, previous procedure about 7 months ago but recurrent and heavy for the last several days.  Hemodynamically stable.  Small amount of bright red blood in vaginal vault on my examination but she reports that she has been passing clots including in the restroom just prior to my examination.  Will check labs and discuss with her OBGYN.  Differential Diagnosis:   Post menopausal bleeding, acute blood loss anemia, antiplatelet use  Clinical Tests:   Lab Tests: Ordered and Reviewed  ED Management:  H&H at her baseline and bleeding mild-moderate at this time. Discussed with Dr. Frias who advised restarting Provera 5 mg daily (with refills) and have her call the clinic today to arrange follow up for further evaluation and management. Plan communicated to patient who verbalized understanding and agreement. ED return precautions reviewed at the bedside and provided in the written discharge instructions. All questions answered to the best of my  ability.                ED Course as of 08/18/22 1610   Wed Aug 17, 2022   6217 Dr. Frias paged to discuss [KS]   0750 Discussed with Dr. Frias who advised starting Provera 5 mg daily, fill 30 days w/ 3 refills, and have the patient call the clinic today to arrange follow up. States he will likely get an outpatient US and consult IR for possible uterine artery ablation.  [KS]      ED Course User Index  [KS] Nessa Hoskins MD             Clinical Impression:   Final diagnoses:  [N93.8] Dysfunctional uterine bleeding (Primary)  [N95.0] Postmenopausal bleeding          ED Disposition Condition    Discharge Stable        ED Prescriptions     Medication Sig Dispense Start Date End Date Auth. Provider    medroxyPROGESTERone (PROVERA) 10 MG tablet Take 0.5 tablets (5 mg total) by mouth once daily. 15 tablet 8/17/2022 11/15/2022 Nessa Hoskins MD        Follow-up Information     Follow up With Specialties Details Why Contact Info    Mick Frias MD Obstetrics and Gynecology  call today to schedule follow up [address] [address]  [phone]      [facility] - Emergency Dept Emergency Medicine   [address] [facility] [id number] [phone]           Nessa Hoskins MD  08/18/22 0071

## 2022-08-22 ENCOUNTER — TELEPHONE (OUTPATIENT)
Dept: GYNECOLOGIC ONCOLOGY | Facility: CLINIC | Age: 82
End: 2022-08-22
Payer: MEDICARE

## 2022-08-22 DIAGNOSIS — N95.0 POST-MENOPAUSAL BLEEDING: Primary | ICD-10-CM

## 2022-08-22 NOTE — NURSING
New referral received from Dr Mick Frias for second opinion on treatment options for pt with PMB with negative EMB. Pt called and name and  verified. Explained my role as nurse navigator and direct number provided. Pt scheduled to see Dr Bradford in the next available appointment. Pt declined sooner appointments at other locations. Patient encouraged to call for any questions or concerns about her care or appointments. Pt verbalized understanding. Date time and location of appointment reviewed with pt. Appointment letter mailed to pt.     Oncology Navigation   Intake  Cancer Type: Gynecologic (PMB, with negative EMB, second opinion on tx plan)  Internal / External Referral: External  Referral Source: Dr Mick Frias  Date of Referral: 2022  Initial Nurse Navigator Contact: 2022  Referral to Initial Contact Timeline (days): 0  Date Worked: 2022  First Appointment Available: 2022  Appointment Date: 2022  First Available Date vs. Scheduled Date (days): 20  Reason if booked > 7 days after scheduling: Transportation coordination; Specific provider / access; Patient request; Other  Other reason booked > 7 days: pt requests [place] location adn declined sooner appointments in [facility]     Treatment   Procedures: Biopsy  Biopsy Schedule Date: 2022 (EMB, negative pathology)         Acuity      Follow Up  No follow-ups on file.

## 2022-09-12 ENCOUNTER — OFFICE VISIT (OUTPATIENT)
Dept: GYNECOLOGIC ONCOLOGY | Facility: CLINIC | Age: 82
End: 2022-09-12
Payer: MEDICARE

## 2022-09-12 VITALS
HEIGHT: 65 IN | DIASTOLIC BLOOD PRESSURE: 69 MMHG | SYSTOLIC BLOOD PRESSURE: 153 MMHG | WEIGHT: 265.75 LBS | BODY MASS INDEX: 44.28 KG/M2 | HEART RATE: 71 BPM

## 2022-09-12 DIAGNOSIS — N95.0 POST-MENOPAUSAL BLEEDING: ICD-10-CM

## 2022-09-12 PROCEDURE — 99205 OFFICE O/P NEW HI 60 MIN: CPT | Mod: S$GLB,,, | Performed by: OBSTETRICS & GYNECOLOGY

## 2022-09-12 PROCEDURE — 99205 PR OFFICE/OUTPT VISIT, NEW, LEVL V, 60-74 MIN: ICD-10-PCS | Mod: S$GLB,,, | Performed by: OBSTETRICS & GYNECOLOGY

## 2022-09-12 RX ORDER — PANTOPRAZOLE SODIUM 40 MG/1
40 TABLET, DELAYED RELEASE ORAL 2 TIMES DAILY
COMMUNITY
Start: 2022-09-06

## 2022-09-12 RX ORDER — AA/PROT/LYSINE/METHIO/VIT C/B6 50-12.5 MG
TABLET ORAL
COMMUNITY

## 2022-09-12 RX ORDER — LOSARTAN POTASSIUM 50 MG/1
50 TABLET ORAL DAILY
COMMUNITY
Start: 2022-06-21

## 2022-09-12 RX ORDER — GUAIFENESIN 1200 MG
325 TABLET, EXTENDED RELEASE 12 HR ORAL
COMMUNITY
Start: 2022-01-21 | End: 2023-07-20

## 2022-09-12 RX ORDER — POTASSIUM CHLORIDE 1500 MG/1
20 TABLET, EXTENDED RELEASE ORAL DAILY
COMMUNITY
Start: 2022-09-06

## 2022-09-12 RX ORDER — ASPIRIN 325 MG
50000 TABLET, DELAYED RELEASE (ENTERIC COATED) ORAL
COMMUNITY
Start: 2022-07-13

## 2022-09-12 RX ORDER — CLOPIDOGREL BISULFATE 75 MG/1
75 TABLET ORAL DAILY
COMMUNITY
Start: 2022-05-12

## 2022-09-12 RX ORDER — FUROSEMIDE 20 MG/1
20 TABLET ORAL 2 TIMES DAILY
COMMUNITY

## 2022-09-12 RX ORDER — ONDANSETRON 4 MG/1
4 TABLET, ORALLY DISINTEGRATING ORAL EVERY 8 HOURS PRN
COMMUNITY
Start: 2022-08-26

## 2022-09-12 RX ORDER — HYDROCHLOROTHIAZIDE 25 MG/1
25 TABLET ORAL DAILY
COMMUNITY
Start: 2022-07-19

## 2022-09-12 RX ORDER — TRAMADOL HYDROCHLORIDE 50 MG/1
50 TABLET ORAL EVERY 6 HOURS PRN
COMMUNITY
End: 2023-10-12

## 2022-09-12 RX ORDER — FOLIC ACID 1 MG/1
1000 TABLET ORAL DAILY
COMMUNITY
Start: 2022-08-30

## 2022-09-12 RX ORDER — LEVOTHYROXINE SODIUM 75 UG/1
75 TABLET ORAL DAILY
COMMUNITY
Start: 2022-07-01

## 2022-09-12 NOTE — PROGRESS NOTES
Subjective:      Patient ID: Adriana Murphy is a 82 y.o. female.    Chief Complaint: Consult      HPI  Here today at the request of Dr. Frias for continued PMB in the face of benign pathology.  Has a long-standing h/o AUB dating back to 2009 with another provider. Her medical comorbid conditions are significant, including severe PVD s/p stents in bilateral LE and chronic Eliquis and Plavix use.  She has been periodically on Provera daily, and was seen in January and had [facility] D&C with polyp and benign endometrium on path.  Because of continued bleeding requiring ED visit in August, she underwent repeat US in August demonstrating a 9 cm uterus with 2 cm stripe.  EMB in office was negative but had minimal endometrial tissue.  Patient desires definitive management.  Feels like menopause was in her 60's.  PSH BTL, Appendectomy, lap dwight, and vascular stent placement.  FH negative for breast/colon/gyn cancers.  Review of Systems   Constitutional:  Negative for activity change, appetite change, chills, fatigue and fever.   HENT:  Negative for hearing loss, mouth sores, nosebleeds, sore throat and tinnitus.    Eyes:  Negative for visual disturbance.   Respiratory:  Negative for cough, chest tightness, shortness of breath and wheezing.    Cardiovascular:  Negative for chest pain and leg swelling.   Gastrointestinal:  Negative for abdominal distention, abdominal pain, blood in stool, constipation, diarrhea, nausea and vomiting.   Genitourinary:  Positive for vaginal bleeding. Negative for dysuria, flank pain, frequency, hematuria, pelvic pain, vaginal discharge and vaginal pain.   Musculoskeletal:  Negative for arthralgias and back pain.   Skin:  Negative for rash.   Neurological:  Negative for dizziness, seizures, syncope, weakness and numbness.   Hematological:  Does not bruise/bleed easily.   Psychiatric/Behavioral:  Negative for confusion and sleep disturbance. The patient is not nervous/anxious.      Past Medical  History:   Diagnosis Date    PAD (peripheral artery disease)     Post-menopausal bleeding     Pulmonary embolus      Past Surgical History:   Procedure Laterality Date    DILATION AND CURETTAGE OF UTERUS      ivc filter       History reviewed. No pertinent family history.  Social History     Socioeconomic History    Marital status:    Tobacco Use    Smoking status: Never    Smokeless tobacco: Never     Current Outpatient Medications   Medication Sig    acetaminophen 325 mg Cap Take 325 mg by mouth.    cholecalciferol, vitamin D3, 1,250 mcg (50,000 unit) capsule Take 50,000 Units by mouth every 7 days.    clopidogreL (PLAVIX) 75 mg tablet Take 75 mg by mouth once daily.    EUTHYROX 75 mcg tablet Take 75 mcg by mouth once daily.    folic acid (FOLVITE) 1 MG tablet Take 1,000 mcg by mouth once daily.    hydroCHLOROthiazide (HYDRODIURIL) 25 MG tablet Take 25 mg by mouth once daily.    losartan (COZAAR) 50 MG tablet Take 50 mg by mouth once daily.    medroxyPROGESTERone (PROVERA) 10 MG tablet Take 0.5 tablets (5 mg total) by mouth once daily.    ondansetron (ZOFRAN-ODT) 4 MG TbDL Take 4 mg by mouth every 8 (eight) hours as needed.    pantoprazole (PROTONIX) 40 MG tablet Take 40 mg by mouth 2 (two) times daily.    potassium chloride (K-TAB) 20 mEq Take 20 mEq by mouth once daily.     No current facility-administered medications for this visit.     Review of patient's allergies indicates:   Allergen Reactions    Cefazolin Rash     Other reaction(s): BLISTERS    Ibuprofen Rash    Aspirin      Other reaction(s): Rectal bleeding, Stomach pain    Chocolate flavor     Morphine     Propoxyphene Hallucinations    Sulfa (sulfonamide antibiotics)     Iodine Rash       Objective:   Physical Exam:   Constitutional: She is oriented to person, place, and time. She appears well-developed and well-nourished. No distress.    HENT:   Head: Normocephalic and atraumatic.    Eyes: No scleral icterus.     Cardiovascular:  Normal rate  and intact distal pulses.      Exam reveals no cyanosis and no edema.        Pulmonary/Chest: Effort normal. No respiratory distress. She exhibits no tenderness.        Abdominal: Soft. She exhibits no distension, no fluid wave and no mass. There is no abdominal tenderness. There is no rigidity, no rebound and no guarding. No hernia.     Genitourinary:    Vagina, uterus and rectum normal.      Pelvic exam was performed with patient supine.   Labial bartholins normal.There is no rash, tenderness or lesion on the right labia. There is no rash, tenderness or lesion on the left labia. Cervix is normal. Right adnexum displays no mass, no tenderness and no fullness. Left adnexum displays no mass, no tenderness and no fullness. No  no vaginal discharge or bleeding in the vagina. Uterus is not deviated, not enlarged, not fixed, not tender, not hosting fibroids and no uterine prolapse. Uterus size: 9 cm.          Musculoskeletal: Normal range of motion and moves all extremeties. No edema.      Lymphadenopathy:     She has no cervical adenopathy.    Neurological: She is alert and oriented to person, place, and time.    Skin: Skin is warm. No rash noted. No cyanosis or erythema.    Psychiatric: She has a normal mood and affect. Thought content normal.     Assessment:     1. Post-menopausal bleeding        Plan:       Counseled patient on current symptoms and path.  Feel her continued bleeding is related to ehr AC and anti-platelet therapy, and that ost of what is in her uterus on US is mucous or blood or both.  Having said that, her persistent bleeding is a problem that has resulted in multiple evals and ED visits as well as minor surgical intervention.  I feel we are headed to hysterectomy at some point, and I therefore counseled her on such.  Will discuss her case with Dr. Fernandez, her cardiologist so as to determine need for bridging therapy surrounding surgery.  Will need to be done in [place] given vascular history.  She and  her  voiced understanding.  Will be in touch with plan.

## 2022-09-26 ENCOUNTER — TELEPHONE (OUTPATIENT)
Dept: GYNECOLOGIC ONCOLOGY | Facility: CLINIC | Age: 82
End: 2022-09-26
Payer: MEDICARE

## 2022-09-26 NOTE — TELEPHONE ENCOUNTER
Pt calling to see if a surgical date has been determined for her procedure that was discussed with Dr rBadford. Pt updated that Dr Bradford office would contact her once he has narrowed down a date. Pt states she is anxiously awaiting that call.

## 2022-10-20 NOTE — TELEPHONE ENCOUNTER
Cardiac surgical clearance and anti-coagulation recommendations received from Dr Fernandez and scanned into pt chart under media.

## 2022-10-27 ENCOUNTER — TELEPHONE (OUTPATIENT)
Dept: GYNECOLOGIC ONCOLOGY | Facility: CLINIC | Age: 82
End: 2022-10-27
Payer: MEDICARE

## 2022-10-27 ENCOUNTER — LAB VISIT (OUTPATIENT)
Dept: LAB | Facility: HOSPITAL | Age: 82
End: 2022-10-27
Attending: INTERNAL MEDICINE
Payer: MEDICARE

## 2022-10-27 DIAGNOSIS — K21.9 GASTROESOPHAGEAL REFLUX DISEASE, UNSPECIFIED WHETHER ESOPHAGITIS PRESENT: ICD-10-CM

## 2022-10-27 DIAGNOSIS — E55.9 AVITAMINOSIS D: ICD-10-CM

## 2022-10-27 DIAGNOSIS — R00.2 PALPITATIONS: ICD-10-CM

## 2022-10-27 DIAGNOSIS — R07.89 OTHER CHEST PAIN: ICD-10-CM

## 2022-10-27 DIAGNOSIS — I20.89 ANGINA DECUBITUS: Primary | ICD-10-CM

## 2022-10-27 DIAGNOSIS — I25.10 CORONARY ATHEROSCLEROSIS OF NATIVE CORONARY ARTERY: ICD-10-CM

## 2022-10-27 DIAGNOSIS — R60.9 EDEMA, UNSPECIFIED TYPE: ICD-10-CM

## 2022-10-27 DIAGNOSIS — R06.02 SHORTNESS OF BREATH: ICD-10-CM

## 2022-10-27 DIAGNOSIS — J44.89 OBSTRUCTIVE CHRONIC BRONCHITIS WITHOUT EXACERBATION: ICD-10-CM

## 2022-10-27 DIAGNOSIS — N95.0 POST-MENOPAUSAL BLEEDING: Primary | ICD-10-CM

## 2022-10-27 DIAGNOSIS — R53.81 DEBILITY: ICD-10-CM

## 2022-10-27 LAB
ALBUMIN SERPL-MCNC: 3.4 GM/DL (ref 3.4–4.8)
ALBUMIN/GLOB SERPL: 1 RATIO (ref 1.1–2)
ALP SERPL-CCNC: 103 UNIT/L (ref 40–150)
ALT SERPL-CCNC: 13 UNIT/L (ref 0–55)
AST SERPL-CCNC: 15 UNIT/L (ref 5–34)
BASOPHILS # BLD AUTO: 0.01 X10(3)/MCL (ref 0–0.2)
BASOPHILS NFR BLD AUTO: 0.2 %
BILIRUBIN DIRECT+TOT PNL SERPL-MCNC: 0.6 MG/DL
BUN SERPL-MCNC: 13.7 MG/DL (ref 9.8–20.1)
CALCIUM SERPL-MCNC: 9.3 MG/DL (ref 8.4–10.2)
CHLORIDE SERPL-SCNC: 107 MMOL/L (ref 98–107)
CHOLEST SERPL-MCNC: 166 MG/DL
CHOLEST/HDLC SERPL: 3 {RATIO} (ref 0–5)
CO2 SERPL-SCNC: 26 MMOL/L (ref 23–31)
CREAT SERPL-MCNC: 0.92 MG/DL (ref 0.55–1.02)
EOSINOPHIL # BLD AUTO: 0.12 X10(3)/MCL (ref 0–0.9)
EOSINOPHIL NFR BLD AUTO: 2.8 %
ERYTHROCYTE [DISTWIDTH] IN BLOOD BY AUTOMATED COUNT: 13.6 % (ref 11.5–17)
GFR SERPLBLD CREATININE-BSD FMLA CKD-EPI: >60 MLS/MIN/1.73/M2
GLOBULIN SER-MCNC: 3.3 GM/DL (ref 2.4–3.5)
GLUCOSE SERPL-MCNC: 105 MG/DL (ref 82–115)
HCT VFR BLD AUTO: 37.9 % (ref 37–47)
HDLC SERPL-MCNC: 54 MG/DL (ref 35–60)
HGB BLD-MCNC: 11.4 GM/DL (ref 12–16)
IMM GRANULOCYTES # BLD AUTO: 0.01 X10(3)/MCL (ref 0–0.04)
IMM GRANULOCYTES NFR BLD AUTO: 0.2 %
LDLC SERPL CALC-MCNC: 99 MG/DL (ref 50–140)
LYMPHOCYTES # BLD AUTO: 1.81 X10(3)/MCL (ref 0.6–4.6)
LYMPHOCYTES NFR BLD AUTO: 41.6 %
MAGNESIUM SERPL-MCNC: 2.2 MG/DL (ref 1.6–2.6)
MCH RBC QN AUTO: 27.2 PG (ref 27–31)
MCHC RBC AUTO-ENTMCNC: 30.1 MG/DL (ref 33–36)
MCV RBC AUTO: 90.5 FL (ref 80–94)
MONOCYTES # BLD AUTO: 0.59 X10(3)/MCL (ref 0.1–1.3)
MONOCYTES NFR BLD AUTO: 13.6 %
NEUTROPHILS # BLD AUTO: 1.8 X10(3)/MCL (ref 2.1–9.2)
NEUTROPHILS NFR BLD AUTO: 41.6 %
NRBC BLD AUTO-RTO: 0 %
PLATELET # BLD AUTO: 243 X10(3)/MCL (ref 130–400)
PMV BLD AUTO: 8.8 FL (ref 7.4–10.4)
POTASSIUM SERPL-SCNC: 4.1 MMOL/L (ref 3.5–5.1)
PROT SERPL-MCNC: 6.7 GM/DL (ref 5.8–7.6)
RBC # BLD AUTO: 4.19 X10(6)/MCL (ref 4.2–5.4)
SODIUM SERPL-SCNC: 142 MMOL/L (ref 136–145)
TRIGL SERPL-MCNC: 67 MG/DL (ref 37–140)
VLDLC SERPL CALC-MCNC: 13 MG/DL
WBC # SPEC AUTO: 4.4 X10(3)/MCL (ref 4.5–11.5)

## 2022-10-27 PROCEDURE — 80053 COMPREHEN METABOLIC PANEL: CPT

## 2022-10-27 PROCEDURE — 36415 COLL VENOUS BLD VENIPUNCTURE: CPT

## 2022-10-27 PROCEDURE — 80061 LIPID PANEL: CPT

## 2022-10-27 PROCEDURE — 85025 COMPLETE CBC W/AUTO DIFF WBC: CPT

## 2022-10-27 PROCEDURE — 83735 ASSAY OF MAGNESIUM: CPT

## 2022-11-07 ENCOUNTER — TELEPHONE (OUTPATIENT)
Dept: GYNECOLOGIC ONCOLOGY | Facility: CLINIC | Age: 82
End: 2022-11-07
Payer: MEDICARE

## 2022-11-07 NOTE — TELEPHONE ENCOUNTER
"----- Message from Kirit Mueller RN sent at 11/7/2022  1:22 PM CST -----    ----- Message -----  From: Lolly Mar  Sent: 11/7/2022  12:30 PM CST  To: Suzette Arias Staff    Consult/Advisory:           Name Of Caller: self    Contact Preference?:  [phone]    What is the nature of the call?: requesting a call back to advise the time that blood thinners need to be stopped before surgery           Additional Notes:  "Thank you for all that you do for our patients'"       "

## 2022-11-07 NOTE — TELEPHONE ENCOUNTER
"Pt contacted and updated that she should hold her Plavix 5 days prior to her surgery. She should stop taking it this Thursday November 10th in preparation for her 11/15/22 surgery. Pt denies taking Eliquis at this time. Pt states "I stopped that 8 months ago". Pt questioned if she needs to hold her Provera that she is currently taking daily. Pt informed that navigator will see clarity and call her back tomorrow with instructions concerning her provera.   "

## 2022-11-10 NOTE — TELEPHONE ENCOUNTER
Pt contacted and LVM updating pt that she should hold her Plavix starting today. She can also stop taking her Provera. Direct navigator's number provided for any additional questions or concerns.

## 2022-11-10 NOTE — TELEPHONE ENCOUNTER
Pt contacted navigator. Pt updated that she can hold her Provera and her Plavix starting today. Pt questioned about her urinary incontinence. Pt informed that she can address that with Dr Bradford and he can recommend a urologist or a URO/GYN in [place] for her to see. Pt also questioned and updated that she will not know her surgical arrival time for Tuesday until Monday afternoon. Pt verbalized understanding and has no further questions at this time.

## 2022-11-14 ENCOUNTER — TELEPHONE (OUTPATIENT)
Dept: GYNECOLOGIC ONCOLOGY | Facility: CLINIC | Age: 82
End: 2022-11-14
Payer: MEDICARE

## 2022-11-14 ENCOUNTER — HOSPITAL ENCOUNTER (OUTPATIENT)
Dept: PREADMISSION TESTING | Facility: OTHER | Age: 82
Discharge: HOME | DRG: 740 | End: 2022-11-14
Attending: OBSTETRICS & GYNECOLOGY
Payer: MEDICARE

## 2022-11-14 ENCOUNTER — ANESTHESIA EVENT (OUTPATIENT)
Dept: SURGERY | Facility: OTHER | Age: 82
DRG: 740 | End: 2022-11-14
Payer: MEDICARE

## 2022-11-14 VITALS
RESPIRATION RATE: 16 BRPM | OXYGEN SATURATION: 99 % | SYSTOLIC BLOOD PRESSURE: 150 MMHG | HEIGHT: 65 IN | TEMPERATURE: 98 F | DIASTOLIC BLOOD PRESSURE: 74 MMHG | BODY MASS INDEX: 43.99 KG/M2 | WEIGHT: 264 LBS | HEART RATE: 71 BPM

## 2022-11-14 DIAGNOSIS — N95.0 POST-MENOPAUSAL BLEEDING: ICD-10-CM

## 2022-11-14 LAB
ABO + RH BLD: NORMAL
BLD GP AB SCN CELLS X3 SERPL QL: NORMAL

## 2022-11-14 PROCEDURE — 86901 BLOOD TYPING SEROLOGIC RH(D): CPT | Performed by: OBSTETRICS & GYNECOLOGY

## 2022-11-14 PROCEDURE — 36415 COLL VENOUS BLD VENIPUNCTURE: CPT | Performed by: OBSTETRICS & GYNECOLOGY

## 2022-11-14 RX ORDER — MELATONIN 1 MG
1 TABLET,CHEWABLE ORAL
COMMUNITY
End: 2024-01-31

## 2022-11-14 RX ORDER — SODIUM CHLORIDE, SODIUM LACTATE, POTASSIUM CHLORIDE, CALCIUM CHLORIDE 600; 310; 30; 20 MG/100ML; MG/100ML; MG/100ML; MG/100ML
INJECTION, SOLUTION INTRAVENOUS CONTINUOUS
Status: CANCELLED | OUTPATIENT
Start: 2022-11-14

## 2022-11-14 RX ORDER — CETIRIZINE HYDROCHLORIDE 10 MG/1
10 TABLET ORAL DAILY
COMMUNITY

## 2022-11-14 RX ORDER — LIDOCAINE HYDROCHLORIDE 10 MG/ML
0.5 INJECTION, SOLUTION EPIDURAL; INFILTRATION; INTRACAUDAL; PERINEURAL ONCE
Status: CANCELLED | OUTPATIENT
Start: 2022-11-14 | End: 2022-11-14

## 2022-11-14 RX ORDER — MULTIVIT WITH MINERALS/HERBS
1 TABLET ORAL DAILY
COMMUNITY

## 2022-11-14 RX ORDER — ACETAMINOPHEN 500 MG
1000 TABLET ORAL
Status: CANCELLED | OUTPATIENT
Start: 2022-11-14 | End: 2022-11-14

## 2022-11-14 RX ORDER — AMOXICILLIN 500 MG
1 CAPSULE ORAL DAILY
COMMUNITY

## 2022-11-14 NOTE — DISCHARGE INSTRUCTIONS
Information to Prepare you for your Surgery    PRE-ADMIT TESTING -  [phone]    [address] [facility]          Your surgery has been scheduled at [facility]. We are pleased to have the opportunity to serve you. For Further Information please call [phone].    On the day of surgery please report to the Information Desk on the 1st floor.    CONTACT YOUR PHYSICIAN'S OFFICE THE DAY PRIOR TO YOUR SURGERY TO OBTAIN YOUR ARRIVAL TIME.     The evening before surgery do not eat anything after 9 p.m. ( this includes hard candy, chewing gum and mints).  You may only have GATORADE, POWERADE AND WATER  from 9 p.m. until you leave your home.   DO NOT DRINK ANY LIQUIDS ON THE WAY TO THE HOSPITAL.      Why does your anesthesiologist allow you to drink Gatorade/Powerade before surgery?  Gatorade/Powerade helps to increase your comfort before surgery and to decrease your nausea after surgery. The carbohydrates in Gatorade/Powerade help reduce your body's stress response to surgery.  If you are a diabetic-drink only water prior to surgery.      Current Visitor policy(12/27/2021) - Patients may have 2 visitors pre and post procedure. Only 2 visitors will be allowed in the Surgical building with the patient.     SPECIAL MEDICATION INSTRUCTIONS: TAKE medications checked off by the Anesthesiologist on your Medication List.    Angiogram Patients: Take medications as instructed by your physician, including aspirin.     Surgery Patients:    If you take ASPIRIN - Your PHYSICIAN/SURGEON will need to inform you IF/OR when you need to stop taking aspirin prior to your surgery.     The week prior to surgery do not ot take any medications containing IBUPROFEN or NSAIDS ( Advil, Motrin, Goodys, BC, Aleve, Naproxen etc) If you are not sure if you should take a medicine please call your surgeon's office.  Ok to take Tylenol    Do Not Wear any make-up (especially eye make-up) to  surgery. Please remove any false eyelashes or eyelash extensions. If you arrive the day of surgery with makeup/eyelashes on you will be required to remove prior to surgery. (There is a risk of corneal abrasions if eye makeup/eyelash extensions are not removed)      Leave all valuables at home.   Do Not wear any jewelry or watches, including any metal in body piercings. Jewelry must be removed prior to coming to the hospital.  There is a possibility that rings that are unable to be removed may be cut off if they are on the surgical extremity.    Please remove all hair extensions, wigs, clips and any other metal accessories/ ornaments from your hair.  These items may pose a flammable/fire risk in Surgery and must be removed.    Do not shave your surgical area at least 5 days prior to your surgery. The surgical prep will be performed at the hospital according to Infection Control regulations.    Contact Lens must be removed before surgery. Either do not wear the contact lens or bring a case and solution for storage.  Please bring a container for eyeglasses or dentures as required.  Bring any paperwork your physician has provided, such as consent forms,  history and physicals, doctor's orders, etc.   Bring comfortable clothes that are loose fitting to wear upon discharge. Take into consideration the type of surgery being performed.  Maintain your diet as advised per your physician the day prior to surgery.      Adequate rest the night before surgery is advised.   Park in the Parking lot behind the hospital or in the [place] [facility] across the street from the parking lot. Parking is complimentary.  If you will be discharged the same day as your procedure, please arrange for a responsible adult to drive you home or to accompany you if traveling by taxi.   YOU WILL NOT BE PERMITTED TO DRIVE OR TO LEAVE THE HOSPITAL ALONE AFTER SURGERY.   If you are being discharged the same day, it is strongly recommended that you  arrange for someone to remain with you for the first 24 hrs following your surgery.    The Surgeon will speak to your family/visitor after your surgery regarding the outcome of your surgery and post op care.  The Surgeon may speak to you after your surgery, but there is a possibility you may not remember the details.  Please check with your family members regarding the conversation with the Surgeon.    We strongly recommend whoever is bringing you home be present for discharge instructions.  This will ensure a thorough understanding for your post op home care.    ALL CHILDREN MUST ALWAYS BE ACCOMPANIED BY AN ADULT.    Visitors-Refer to current Visitor policy handouts.    Thank you for your cooperation.  The Staff of [facility].            Bathing Instructions with Hibiclens    Shower the evening before and morning of your procedure with Chlorhexidine (Hibiclens)  do not use Chlorhexidine on your face or genitals. Do not get in your eyes.  Wash your face with water and your regular face wash/soap  Use your regular shampoo  Apply Chlorhexidine (Hibiclens) directly on your skin or on a wet washcloth and wash gently. When showering: Move away from the shower stream when applying Chlorhexidine (Hibiclens) to avoid rinsing off too soon.  Rinse thoroughly with warm water  Do not dilute Chlorhexidine (Hibiclens)   Dry off as usual, do not use any deodorant, powder, body lotions, perfume, after shave or cologne.

## 2022-11-14 NOTE — ANESTHESIA PREPROCEDURE EVALUATION
11/14/2022  Adriana Murphy is a 82 y.o., female.      Pre-op Assessment    I have reviewed the Patient Summary Reports.     I have reviewed the Nursing Notes. I have reviewed the NPO Status.   I have reviewed the Medications.     Review of Systems  Anesthesia Hx:  Denies Family Hx of Anesthesia complications.   Denies Personal Hx of Anesthesia complications.   Social:  Non-Smoker    Hematology/Oncology:  Hematology Normal   Oncology Normal     EENT/Dental:EENT/Dental Normal   Cardiovascular:   Hypertension PVD H/O dvt AND PE   Pulmonary:   Asthma    Renal/:  Renal/ Normal     Hepatic/GI:   GERD    Musculoskeletal:  Musculoskeletal Normal    Neurological:  Neurology Normal    Endocrine:   Hypothyroidism  Morbid Obesity / BMI > 40  Dermatological:  Skin Normal    Psych:  Psychiatric Normal           Physical Exam  General: Well nourished, Cooperative, Alert and Oriented    Airway:  Mallampati: III   Mouth Opening: Normal  TM Distance: Normal  Tongue: Normal  Neck ROM: Normal ROM    Dental:  Intact        Anesthesia Plan  Type of Anesthesia, risks & benefits discussed:    Anesthesia Type: Gen ETT  Intra-op Monitoring Plan: Standard ASA Monitors  Post Op Pain Control Plan: multimodal analgesia  Induction:  IV  Airway Plan: Video  Informed Consent: Informed consent signed with the Patient and all parties understand the risks and agree with anesthesia plan.  All questions answered.   ASA Score: 3  Anesthesia Plan Notes: TYPE AND SCREEN TODAY    CARDIAC CLEARANCE NOTED. PLAVIX LAST DOSE 11/09/22    RECENT CBC AND CMP OK    Ready For Surgery From Anesthesia Perspective.     .

## 2022-11-15 ENCOUNTER — HOSPITAL ENCOUNTER (INPATIENT)
Facility: OTHER | Age: 82
LOS: 1 days | Discharge: HOME | DRG: 740 | End: 2022-11-16
Attending: OBSTETRICS & GYNECOLOGY | Admitting: OBSTETRICS & GYNECOLOGY
Payer: MEDICARE

## 2022-11-15 ENCOUNTER — ANESTHESIA (OUTPATIENT)
Dept: SURGERY | Facility: OTHER | Age: 82
DRG: 740 | End: 2022-11-15
Payer: MEDICARE

## 2022-11-15 DIAGNOSIS — N95.0 PMB (POSTMENOPAUSAL BLEEDING): ICD-10-CM

## 2022-11-15 DIAGNOSIS — N95.0 POST-MENOPAUSAL BLEEDING: Primary | ICD-10-CM

## 2022-11-15 DIAGNOSIS — Z90.710 HISTORY OF ROBOT-ASSISTED LAPAROSCOPIC HYSTERECTOMY: ICD-10-CM

## 2022-11-15 LAB — POCT GLUCOSE: 103 MG/DL (ref 70–110)

## 2022-11-15 PROCEDURE — 88360 TUMOR IMMUNOHISTOCHEM/MANUAL: CPT | Mod: 26,,, | Performed by: PATHOLOGY

## 2022-11-15 PROCEDURE — 63600175 PHARM REV CODE 636 W HCPCS: Performed by: ANESTHESIOLOGY

## 2022-11-15 PROCEDURE — 63600175 PHARM REV CODE 636 W HCPCS

## 2022-11-15 PROCEDURE — 25000003 PHARM REV CODE 250: Performed by: ANESTHESIOLOGY

## 2022-11-15 PROCEDURE — 37000009 HC ANESTHESIA EA ADD 15 MINS: Performed by: OBSTETRICS & GYNECOLOGY

## 2022-11-15 PROCEDURE — 36000712 HC OR TIME LEV V 1ST 15 MIN: Performed by: OBSTETRICS & GYNECOLOGY

## 2022-11-15 PROCEDURE — 27201423 OPTIME MED/SURG SUP & DEVICES STERILE SUPPLY: Performed by: OBSTETRICS & GYNECOLOGY

## 2022-11-15 PROCEDURE — 88341 IMHCHEM/IMCYTCHM EA ADD ANTB: CPT | Performed by: PATHOLOGY

## 2022-11-15 PROCEDURE — 63600175 PHARM REV CODE 636 W HCPCS: Performed by: NURSE ANESTHETIST, CERTIFIED REGISTERED

## 2022-11-15 PROCEDURE — 25000003 PHARM REV CODE 250: Performed by: STUDENT IN AN ORGANIZED HEALTH CARE EDUCATION/TRAINING PROGRAM

## 2022-11-15 PROCEDURE — 58571 PR LAPAROSCOPY W TOT HYSTERECTUTERUS <=250 GRAM  W TUBE/OVARY: ICD-10-PCS | Mod: ,,, | Performed by: OBSTETRICS & GYNECOLOGY

## 2022-11-15 PROCEDURE — 25000003 PHARM REV CODE 250: Performed by: NURSE ANESTHETIST, CERTIFIED REGISTERED

## 2022-11-15 PROCEDURE — 58571 TLH W/T/O 250 G OR LESS: CPT | Mod: ,,, | Performed by: OBSTETRICS & GYNECOLOGY

## 2022-11-15 PROCEDURE — 88342 IMHCHEM/IMCYTCHM 1ST ANTB: CPT | Performed by: PATHOLOGY

## 2022-11-15 PROCEDURE — 94799 UNLISTED PULMONARY SVC/PX: CPT

## 2022-11-15 PROCEDURE — 88307 TISSUE EXAM BY PATHOLOGIST: CPT | Performed by: PATHOLOGY

## 2022-11-15 PROCEDURE — 88307 TISSUE EXAM BY PATHOLOGIST: CPT | Mod: 26,,, | Performed by: PATHOLOGY

## 2022-11-15 PROCEDURE — 37000008 HC ANESTHESIA 1ST 15 MINUTES: Performed by: OBSTETRICS & GYNECOLOGY

## 2022-11-15 PROCEDURE — 88360 PR  TUMOR IMMUNOHISTOCHEM/MANUAL: ICD-10-PCS | Mod: 26,,, | Performed by: PATHOLOGY

## 2022-11-15 PROCEDURE — 88341 PR IHC OR ICC EACH ADD'L SINGLE ANTIBODY  STAINPR: ICD-10-PCS | Mod: 26,59,, | Performed by: PATHOLOGY

## 2022-11-15 PROCEDURE — 25000003 PHARM REV CODE 250: Performed by: OBSTETRICS & GYNECOLOGY

## 2022-11-15 PROCEDURE — 82962 GLUCOSE BLOOD TEST: CPT | Performed by: OBSTETRICS & GYNECOLOGY

## 2022-11-15 PROCEDURE — 58571 PR LAPAROSCOPY W TOT HYSTERECTUTERUS <=250 GRAM  W TUBE/OVARY: ICD-10-PCS | Mod: AS,,, | Performed by: NURSE PRACTITIONER

## 2022-11-15 PROCEDURE — 88307 PR  SURG PATH,LEVEL V: ICD-10-PCS | Mod: 26,,, | Performed by: PATHOLOGY

## 2022-11-15 PROCEDURE — 88342 IMHCHEM/IMCYTCHM 1ST ANTB: CPT | Mod: 26,XU,, | Performed by: PATHOLOGY

## 2022-11-15 PROCEDURE — 88341 IMHCHEM/IMCYTCHM EA ADD ANTB: CPT | Mod: 26,59,, | Performed by: PATHOLOGY

## 2022-11-15 PROCEDURE — 88342 CHG IMMUNOCYTOCHEMISTRY: ICD-10-PCS | Mod: 26,XU,, | Performed by: PATHOLOGY

## 2022-11-15 PROCEDURE — 71000033 HC RECOVERY, INTIAL HOUR: Performed by: OBSTETRICS & GYNECOLOGY

## 2022-11-15 PROCEDURE — 58571 TLH W/T/O 250 G OR LESS: CPT | Mod: AS,,, | Performed by: NURSE PRACTITIONER

## 2022-11-15 PROCEDURE — 88360 TUMOR IMMUNOHISTOCHEM/MANUAL: CPT | Mod: 59 | Performed by: PATHOLOGY

## 2022-11-15 PROCEDURE — 11000001 HC ACUTE MED/SURG PRIVATE ROOM

## 2022-11-15 PROCEDURE — 36000713 HC OR TIME LEV V EA ADD 15 MIN: Performed by: OBSTETRICS & GYNECOLOGY

## 2022-11-15 PROCEDURE — 94761 N-INVAS EAR/PLS OXIMETRY MLT: CPT

## 2022-11-15 PROCEDURE — P9045 ALBUMIN (HUMAN), 5%, 250 ML: HCPCS | Mod: JG | Performed by: NURSE ANESTHETIST, CERTIFIED REGISTERED

## 2022-11-15 PROCEDURE — 71000039 HC RECOVERY, EACH ADD'L HOUR: Performed by: OBSTETRICS & GYNECOLOGY

## 2022-11-15 RX ORDER — DEXMEDETOMIDINE HYDROCHLORIDE 100 UG/ML
INJECTION, SOLUTION INTRAVENOUS
Status: DISCONTINUED | OUTPATIENT
Start: 2022-11-15 | End: 2022-11-15

## 2022-11-15 RX ORDER — ACETAMINOPHEN 500 MG
1000 TABLET ORAL EVERY 6 HOURS PRN
Status: DISCONTINUED | OUTPATIENT
Start: 2022-11-15 | End: 2022-11-16 | Stop reason: HOSPADM

## 2022-11-15 RX ORDER — ACETAMINOPHEN 500 MG
1000 TABLET ORAL
Status: COMPLETED | OUTPATIENT
Start: 2022-11-15 | End: 2022-11-15

## 2022-11-15 RX ORDER — ONDANSETRON 2 MG/ML
4 INJECTION INTRAMUSCULAR; INTRAVENOUS EVERY 4 HOURS PRN
Status: DISCONTINUED | OUTPATIENT
Start: 2022-11-15 | End: 2022-11-16 | Stop reason: HOSPADM

## 2022-11-15 RX ORDER — LIDOCAINE HYDROCHLORIDE 10 MG/ML
0.5 INJECTION, SOLUTION EPIDURAL; INFILTRATION; INTRACAUDAL; PERINEURAL ONCE
Status: DISCONTINUED | OUTPATIENT
Start: 2022-11-15 | End: 2022-11-15 | Stop reason: HOSPADM

## 2022-11-15 RX ORDER — CEFAZOLIN SODIUM 2 G/50ML
2 SOLUTION INTRAVENOUS
Status: DISCONTINUED | OUTPATIENT
Start: 2022-11-15 | End: 2022-11-15 | Stop reason: HOSPADM

## 2022-11-15 RX ORDER — KETOROLAC TROMETHAMINE 30 MG/ML
30 INJECTION, SOLUTION INTRAMUSCULAR; INTRAVENOUS ONCE
Status: COMPLETED | OUTPATIENT
Start: 2022-11-15 | End: 2022-11-15

## 2022-11-15 RX ORDER — PROPOFOL 10 MG/ML
VIAL (ML) INTRAVENOUS
Status: DISCONTINUED | OUTPATIENT
Start: 2022-11-15 | End: 2022-11-15

## 2022-11-15 RX ORDER — MUPIROCIN 20 MG/G
OINTMENT TOPICAL
Status: DISCONTINUED | OUTPATIENT
Start: 2022-11-15 | End: 2022-11-15 | Stop reason: HOSPADM

## 2022-11-15 RX ORDER — OXYCODONE HYDROCHLORIDE 5 MG/1
5 TABLET ORAL
Status: DISCONTINUED | OUTPATIENT
Start: 2022-11-15 | End: 2022-11-15 | Stop reason: HOSPADM

## 2022-11-15 RX ORDER — LEVOTHYROXINE SODIUM 75 UG/1
75 TABLET ORAL
Status: DISCONTINUED | OUTPATIENT
Start: 2022-11-16 | End: 2022-11-16 | Stop reason: HOSPADM

## 2022-11-15 RX ORDER — ONDANSETRON 2 MG/ML
INJECTION INTRAMUSCULAR; INTRAVENOUS
Status: DISCONTINUED | OUTPATIENT
Start: 2022-11-15 | End: 2022-11-15

## 2022-11-15 RX ORDER — LIDOCAINE HYDROCHLORIDE 20 MG/ML
INJECTION INTRAVENOUS
Status: DISCONTINUED | OUTPATIENT
Start: 2022-11-15 | End: 2022-11-15

## 2022-11-15 RX ORDER — HYDROMORPHONE HYDROCHLORIDE 2 MG/ML
0.4 INJECTION, SOLUTION INTRAMUSCULAR; INTRAVENOUS; SUBCUTANEOUS EVERY 5 MIN PRN
Status: DISCONTINUED | OUTPATIENT
Start: 2022-11-15 | End: 2022-11-15 | Stop reason: HOSPADM

## 2022-11-15 RX ORDER — ALBUMIN HUMAN 50 G/1000ML
SOLUTION INTRAVENOUS CONTINUOUS PRN
Status: DISCONTINUED | OUTPATIENT
Start: 2022-11-15 | End: 2022-11-15

## 2022-11-15 RX ORDER — HYDROMORPHONE HYDROCHLORIDE 1 MG/ML
0.2 INJECTION, SOLUTION INTRAMUSCULAR; INTRAVENOUS; SUBCUTANEOUS
Status: DISCONTINUED | OUTPATIENT
Start: 2022-11-15 | End: 2022-11-16 | Stop reason: HOSPADM

## 2022-11-15 RX ORDER — ONDANSETRON 2 MG/ML
4 INJECTION INTRAMUSCULAR; INTRAVENOUS DAILY PRN
Status: DISCONTINUED | OUTPATIENT
Start: 2022-11-15 | End: 2022-11-15 | Stop reason: HOSPADM

## 2022-11-15 RX ORDER — HYDRALAZINE HYDROCHLORIDE 20 MG/ML
10 INJECTION INTRAMUSCULAR; INTRAVENOUS EVERY 4 HOURS PRN
Status: DISCONTINUED | OUTPATIENT
Start: 2022-11-15 | End: 2022-11-16 | Stop reason: HOSPADM

## 2022-11-15 RX ORDER — ROCURONIUM BROMIDE 10 MG/ML
INJECTION, SOLUTION INTRAVENOUS
Status: DISCONTINUED | OUTPATIENT
Start: 2022-11-15 | End: 2022-11-15

## 2022-11-15 RX ORDER — SODIUM CHLORIDE 0.9 % (FLUSH) 0.9 %
3 SYRINGE (ML) INJECTION
Status: DISCONTINUED | OUTPATIENT
Start: 2022-11-15 | End: 2022-11-15 | Stop reason: HOSPADM

## 2022-11-15 RX ORDER — KETOROLAC TROMETHAMINE 30 MG/ML
15 INJECTION, SOLUTION INTRAMUSCULAR; INTRAVENOUS EVERY 6 HOURS PRN
Status: DISCONTINUED | OUTPATIENT
Start: 2022-11-15 | End: 2022-11-16 | Stop reason: HOSPADM

## 2022-11-15 RX ORDER — DEXAMETHASONE SODIUM PHOSPHATE 4 MG/ML
INJECTION, SOLUTION INTRA-ARTICULAR; INTRALESIONAL; INTRAMUSCULAR; INTRAVENOUS; SOFT TISSUE
Status: DISCONTINUED | OUTPATIENT
Start: 2022-11-15 | End: 2022-11-15

## 2022-11-15 RX ORDER — LIDOCAINE HYDROCHLORIDE 10 MG/ML
1 INJECTION, SOLUTION EPIDURAL; INFILTRATION; INTRACAUDAL; PERINEURAL ONCE
Status: DISCONTINUED | OUTPATIENT
Start: 2022-11-15 | End: 2022-11-15 | Stop reason: HOSPADM

## 2022-11-15 RX ORDER — MEPERIDINE HYDROCHLORIDE 25 MG/ML
12.5 INJECTION INTRAMUSCULAR; INTRAVENOUS; SUBCUTANEOUS ONCE AS NEEDED
Status: DISCONTINUED | OUTPATIENT
Start: 2022-11-15 | End: 2022-11-15 | Stop reason: HOSPADM

## 2022-11-15 RX ORDER — HEPARIN SODIUM 5000 [USP'U]/ML
5000 INJECTION, SOLUTION INTRAVENOUS; SUBCUTANEOUS EVERY 8 HOURS
Status: CANCELLED | OUTPATIENT
Start: 2022-11-15 | Stop reason: CLARIF

## 2022-11-15 RX ORDER — SUCCINYLCHOLINE CHLORIDE 20 MG/ML
INJECTION INTRAMUSCULAR; INTRAVENOUS
Status: DISCONTINUED | OUTPATIENT
Start: 2022-11-15 | End: 2022-11-15

## 2022-11-15 RX ORDER — HYDROMORPHONE HYDROCHLORIDE 2 MG/ML
INJECTION, SOLUTION INTRAMUSCULAR; INTRAVENOUS; SUBCUTANEOUS
Status: DISCONTINUED | OUTPATIENT
Start: 2022-11-15 | End: 2022-11-15

## 2022-11-15 RX ORDER — SIMETHICONE 80 MG
1 TABLET,CHEWABLE ORAL 3 TIMES DAILY
Status: DISCONTINUED | OUTPATIENT
Start: 2022-11-15 | End: 2022-11-16 | Stop reason: HOSPADM

## 2022-11-15 RX ORDER — SODIUM CHLORIDE, SODIUM LACTATE, POTASSIUM CHLORIDE, CALCIUM CHLORIDE 600; 310; 30; 20 MG/100ML; MG/100ML; MG/100ML; MG/100ML
INJECTION, SOLUTION INTRAVENOUS CONTINUOUS
Status: DISCONTINUED | OUTPATIENT
Start: 2022-11-15 | End: 2022-11-15

## 2022-11-15 RX ORDER — FENTANYL CITRATE 50 UG/ML
INJECTION, SOLUTION INTRAMUSCULAR; INTRAVENOUS
Status: DISCONTINUED | OUTPATIENT
Start: 2022-11-15 | End: 2022-11-15

## 2022-11-15 RX ORDER — OXYCODONE HYDROCHLORIDE 5 MG/1
5 TABLET ORAL EVERY 4 HOURS PRN
Status: DISCONTINUED | OUTPATIENT
Start: 2022-11-15 | End: 2022-11-16 | Stop reason: HOSPADM

## 2022-11-15 RX ORDER — KETOROLAC TROMETHAMINE 30 MG/ML
15 INJECTION, SOLUTION INTRAMUSCULAR; INTRAVENOUS EVERY 6 HOURS PRN
Status: DISCONTINUED | OUTPATIENT
Start: 2022-11-15 | End: 2022-11-15

## 2022-11-15 RX ADMIN — ONDANSETRON HYDROCHLORIDE 4 MG: 2 INJECTION INTRAMUSCULAR; INTRAVENOUS at 01:11

## 2022-11-15 RX ADMIN — KETOROLAC TROMETHAMINE 15 MG: 30 INJECTION, SOLUTION INTRAMUSCULAR; INTRAVENOUS at 05:11

## 2022-11-15 RX ADMIN — LIDOCAINE HYDROCHLORIDE 60 MG: 20 INJECTION, SOLUTION INTRAVENOUS at 12:11

## 2022-11-15 RX ADMIN — DEXAMETHASONE SODIUM PHOSPHATE 4 MG: 4 INJECTION, SOLUTION INTRAMUSCULAR; INTRAVENOUS at 12:11

## 2022-11-15 RX ADMIN — FENTANYL CITRATE 50 MCG: 50 INJECTION, SOLUTION INTRAMUSCULAR; INTRAVENOUS at 12:11

## 2022-11-15 RX ADMIN — SODIUM CHLORIDE, SODIUM LACTATE, POTASSIUM CHLORIDE, AND CALCIUM CHLORIDE: .6; .31; .03; .02 INJECTION, SOLUTION INTRAVENOUS at 11:11

## 2022-11-15 RX ADMIN — PROPOFOL 20 MG: 10 INJECTION, EMULSION INTRAVENOUS at 02:11

## 2022-11-15 RX ADMIN — ROCURONIUM BROMIDE 10 MG: 10 SOLUTION INTRAVENOUS at 12:11

## 2022-11-15 RX ADMIN — LIDOCAINE HYDROCHLORIDE 40 MG: 20 INJECTION, SOLUTION INTRAVENOUS at 02:11

## 2022-11-15 RX ADMIN — SIMETHICONE 80 MG: 80 TABLET, CHEWABLE ORAL at 08:11

## 2022-11-15 RX ADMIN — SUCCINYLCHOLINE CHLORIDE 160 MG: 20 INJECTION, SOLUTION INTRAMUSCULAR; INTRAVENOUS at 12:11

## 2022-11-15 RX ADMIN — KETOROLAC TROMETHAMINE 15 MG: 30 INJECTION, SOLUTION INTRAMUSCULAR; INTRAVENOUS at 11:11

## 2022-11-15 RX ADMIN — HYDROMORPHONE HYDROCHLORIDE 0.4 MG: 2 INJECTION INTRAMUSCULAR; INTRAVENOUS; SUBCUTANEOUS at 01:11

## 2022-11-15 RX ADMIN — DEXTROSE 2 G: 50 INJECTION, SOLUTION INTRAVENOUS at 12:11

## 2022-11-15 RX ADMIN — MUPIROCIN: 20 OINTMENT TOPICAL at 09:11

## 2022-11-15 RX ADMIN — SUGAMMADEX 200 MG: 100 INJECTION, SOLUTION INTRAVENOUS at 02:11

## 2022-11-15 RX ADMIN — PROPOFOL 150 MG: 10 INJECTION, EMULSION INTRAVENOUS at 12:11

## 2022-11-15 RX ADMIN — OXYCODONE HYDROCHLORIDE 5 MG: 5 TABLET ORAL at 03:11

## 2022-11-15 RX ADMIN — SODIUM CHLORIDE, SODIUM LACTATE, POTASSIUM CHLORIDE, AND CALCIUM CHLORIDE: .6; .31; .03; .02 INJECTION, SOLUTION INTRAVENOUS at 02:11

## 2022-11-15 RX ADMIN — PROPOFOL 20 MG: 10 INJECTION, EMULSION INTRAVENOUS at 12:11

## 2022-11-15 RX ADMIN — ROCURONIUM BROMIDE 20 MG: 10 SOLUTION INTRAVENOUS at 01:11

## 2022-11-15 RX ADMIN — ALBUMIN (HUMAN): 12.5 SOLUTION INTRAVENOUS at 01:11

## 2022-11-15 RX ADMIN — ACETAMINOPHEN 1000 MG: 500 TABLET ORAL at 08:11

## 2022-11-15 RX ADMIN — FENTANYL CITRATE 100 MCG: 50 INJECTION, SOLUTION INTRAMUSCULAR; INTRAVENOUS at 12:11

## 2022-11-15 RX ADMIN — DEXMEDETOMIDINE HYDROCHLORIDE 12 MCG: 100 INJECTION, SOLUTION, CONCENTRATE INTRAVENOUS at 01:11

## 2022-11-15 RX ADMIN — CARBOXYMETHYLCELLULOSE SODIUM 2 DROP: 2.5 SOLUTION/ DROPS OPHTHALMIC at 12:11

## 2022-11-15 RX ADMIN — SODIUM CHLORIDE, SODIUM LACTATE, POTASSIUM CHLORIDE, AND CALCIUM CHLORIDE: .6; .31; .03; .02 INJECTION, SOLUTION INTRAVENOUS at 05:11

## 2022-11-15 RX ADMIN — ROCURONIUM BROMIDE 40 MG: 10 SOLUTION INTRAVENOUS at 12:11

## 2022-11-15 RX ADMIN — ACETAMINOPHEN 1000 MG: 500 TABLET, FILM COATED ORAL at 09:11

## 2022-11-15 RX ADMIN — GLYCOPYRROLATE 0.4 MG: 0.2 INJECTION, SOLUTION INTRAMUSCULAR; INTRAVITREAL at 12:11

## 2022-11-15 RX ADMIN — ONDANSETRON 4 MG: 2 INJECTION INTRAMUSCULAR; INTRAVENOUS at 03:11

## 2022-11-15 RX ADMIN — KETOROLAC TROMETHAMINE 30 MG: 30 INJECTION, SOLUTION INTRAMUSCULAR; INTRAVENOUS at 03:11

## 2022-11-15 NOTE — TRANSFER OF CARE
"Anesthesia Transfer of Care Note    Patient: Adriana Murphy    Procedure(s) Performed: Procedure(s) (LRB):  XI ROBOTIC HYSTERECTOMY (N/A)  XI ROBOTIC SALPINGO-OOPHORECTOMY (Bilateral)    Patient location: PACU    Anesthesia Type: general    Transport from OR: Transported from OR on 2-3 L/min O2 by NC with adequate spontaneous ventilation. Continuous SpO2 monitoring in transport    Post pain: adequate analgesia    Post assessment: no apparent anesthetic complications    Post vital signs: stable    Level of consciousness: awake    Nausea/Vomiting: no nausea/vomiting    Complications: none    Transfer of care protocol was followed      Last vitals:   Visit Vitals  BP (!) 159/77 (BP Location: Right arm, Patient Position: Lying)   Pulse 79   Temp 36 °C (96.8 °F) (Temporal)   Resp 16   Ht 5' 5" (1.651 m)   Wt 119.7 kg (264 lb)   SpO2 98%   Breastfeeding No   BMI 43.93 kg/m²     "

## 2022-11-15 NOTE — ANESTHESIA PROCEDURE NOTES
Intubation    Date/Time: 11/15/2022 12:02 PM  Performed by: Nicolette Farris CRNA  Authorized by: Héctor Waddell MD     Intubation:     Induction:  Intravenous    Intubated:  Postinduction    Mask Ventilation:  Easy with oral airway (two folded sheets under shoulders)    Attempts:  1    Attempted By:  Staff anesthesiologist    Method of Intubation:  Video laryngoscopy    Blade:  Howard 3    Laryngeal View Grade: Grade I - full view of cords      Difficult Airway Encountered?: No      Complications:  None    Airway Device:  Oral endotracheal tube    Airway Device Size:  7.5    Style/Cuff Inflation:  Cuffed (inflated to minimal occlusive pressure)    Inflation Amount (mL):  6    Tube secured:  22    Secured at:  The lips    Placement Verified By:  Capnometry    Complicating Factors:  Obesity, short neck and small mouth    Findings Post-Intubation:  BS equal bilateral and atraumatic/condition of teeth unchanged

## 2022-11-15 NOTE — ANESTHESIA POSTPROCEDURE EVALUATION
Anesthesia Post Evaluation    Patient: Adriana Murphy    Procedure(s) Performed: Procedure(s) (LRB):  XI ROBOTIC HYSTERECTOMY (N/A)  XI ROBOTIC SALPINGO-OOPHORECTOMY (Bilateral)    Final Anesthesia Type: general      Patient location during evaluation: PACU  Patient participation: Yes- Able to Participate  Level of consciousness: awake and alert  Post-procedure vital signs: reviewed and stable  Pain management: adequate  Airway patency: patent    PONV status at discharge: No PONV  Anesthetic complications: no      Cardiovascular status: blood pressure returned to baseline  Respiratory status: unassisted and spontaneous ventilation  Hydration status: euvolemic  Follow-up not needed.          Vitals Value Taken Time   /93 11/15/22 1545   Temp 36.3 °C (97.3 °F) 11/15/22 1530   Pulse 71 11/15/22 1545   Resp 16 11/15/22 1545   SpO2 98 % 11/15/22 1545         Event Time   Out of Recovery 16:03:09         Pain/Los Score: Pain Rating Prior to Med Admin: 8 (11/15/2022  5:42 PM)  Pain Rating Post Med Admin: 5 (11/15/2022  3:40 PM)  Los Score: 9 (11/15/2022  3:40 PM)

## 2022-11-15 NOTE — OPERATIVE NOTE ADDENDUM
Certification of Assistant at Surgery       Surgery Date: 11/15/2022     Participating Surgeons:  Surgeon(s) and Role:     * Hugo Bradford MD - Primary     *  Tea Vides MD - Resident - Assisting    Procedures:  Procedure(s) (LRB):  XI ROBOTIC HYSTERECTOMY (N/A)  XI ROBOTIC SALPINGO-OOPHORECTOMY (Bilateral)    Assistant Surgeon's Certification of Necessity:  I understand that section 1842 (b) (6) (d) of the Social Security Act generally prohibits Medicare Part B reasonable charge payment for the services of assistants at surgery in teaching hospitals when qualified residents are available to furnish such services. I certify that the services for which payment is claimed were medically necessary, and that no qualified resident was available to perform the services. I further understand that these services are subject to post-payment review by the Medicare carrier.      Karie Molina NP    11/15/2022  3:52 PM

## 2022-11-15 NOTE — H&P
HPI  Here today at the request of Dr. Frias for continued PMB in the face of benign pathology.  Has a long-standing h/o AUB dating back to 2009 with another provider. Her medical comorbid conditions are significant, including severe PVD s/p stents in bilateral LE and chronic Eliquis and Plavix use.  She has been periodically on Provera daily, and was seen in January and had HSC D&C with polyp and benign endometrium on path.  Because of continued bleeding requiring ED visit in August, she underwent repeat US in August demonstrating a 9 cm uterus with 2 cm stripe.  EMB in office was negative but had minimal endometrial tissue.  Patient desires definitive management.  Feels like menopause was in her 60's.  PSH BTL, Appendectomy, lap dwight, and vascular stent placement.  FH negative for breast/colon/gyn cancers.  Review of Systems   Constitutional:  Negative for activity change, appetite change, chills, fatigue and fever.   HENT:  Negative for hearing loss, mouth sores, nosebleeds, sore throat and tinnitus.    Eyes:  Negative for visual disturbance.   Respiratory:  Negative for cough, chest tightness, shortness of breath and wheezing.    Cardiovascular:  Negative for chest pain and leg swelling.   Gastrointestinal:  Negative for abdominal distention, abdominal pain, blood in stool, constipation, diarrhea, nausea and vomiting.   Genitourinary:  Positive for vaginal bleeding. Negative for dysuria, flank pain, frequency, hematuria, pelvic pain, vaginal discharge and vaginal pain.   Musculoskeletal:  Negative for arthralgias and back pain.   Skin:  Negative for rash.   Neurological:  Negative for dizziness, seizures, syncope, weakness and numbness.   Hematological:  Does not bruise/bleed easily.   Psychiatric/Behavioral:  Negative for confusion and sleep disturbance. The patient is not nervous/anxious.            Past Medical History:   Diagnosis Date    PAD (peripheral artery disease)      Post-menopausal bleeding       Pulmonary embolus              Past Surgical History:   Procedure Laterality Date    DILATION AND CURETTAGE OF UTERUS        ivc filter          History reviewed. No pertinent family history.  Social History              Socioeconomic History    Marital status:    Tobacco Use    Smoking status: Never    Smokeless tobacco: Never              Current Outpatient Medications   Medication Sig    acetaminophen 325 mg Cap Take 325 mg by mouth.    cholecalciferol, vitamin D3, 1,250 mcg (50,000 unit) capsule Take 50,000 Units by mouth every 7 days.    clopidogreL (PLAVIX) 75 mg tablet Take 75 mg by mouth once daily.    EUTHYROX 75 mcg tablet Take 75 mcg by mouth once daily.    folic acid (FOLVITE) 1 MG tablet Take 1,000 mcg by mouth once daily.    hydroCHLOROthiazide (HYDRODIURIL) 25 MG tablet Take 25 mg by mouth once daily.    losartan (COZAAR) 50 MG tablet Take 50 mg by mouth once daily.    medroxyPROGESTERone (PROVERA) 10 MG tablet Take 0.5 tablets (5 mg total) by mouth once daily.    ondansetron (ZOFRAN-ODT) 4 MG TbDL Take 4 mg by mouth every 8 (eight) hours as needed.    pantoprazole (PROTONIX) 40 MG tablet Take 40 mg by mouth 2 (two) times daily.    potassium chloride (K-TAB) 20 mEq Take 20 mEq by mouth once daily.      No current facility-administered medications for this visit.            Review of patient's allergies indicates:   Allergen Reactions    Cefazolin Rash       Other reaction(s): BLISTERS    Ibuprofen Rash    Aspirin         Other reaction(s): Rectal bleeding, Stomach pain    Chocolate flavor      Morphine      Propoxyphene Hallucinations    Sulfa (sulfonamide antibiotics)      Iodine Rash         Objective:   Physical Exam:   Constitutional: She is oriented to person, place, and time. She appears well-developed and well-nourished. No distress.    HENT:   Head: Normocephalic and atraumatic.    Eyes: No scleral icterus.     Cardiovascular:  Normal rate and intact distal pulses.      Exam reveals no  cyanosis and no edema.        Pulmonary/Chest: Effort normal. No respiratory distress. She exhibits no tenderness.         Abdominal: Soft. She exhibits no distension, no fluid wave and no mass. There is no abdominal tenderness. There is no rigidity, no rebound and no guarding. No hernia.     Genitourinary:    Vagina, uterus and rectum normal.      Pelvic exam was performed with patient supine.   Labial bartholins normal.There is no rash, tenderness or lesion on the right labia. There is no rash, tenderness or lesion on the left labia. Cervix is normal. Right adnexum displays no mass, no tenderness and no fullness. Left adnexum displays no mass, no tenderness and no fullness. No  no vaginal discharge or bleeding in the vagina. Uterus is not deviated, not enlarged, not fixed, not tender, not hosting fibroids and no uterine prolapse. Uterus size: 9 cm.          Musculoskeletal: Normal range of motion and moves all extremeties. No edema.      Lymphadenopathy:     She has no cervical adenopathy.    Neurological: She is alert and oriented to person, place, and time.    Skin: Skin is warm. No rash noted. No cyanosis or erythema.    Psychiatric: She has a normal mood and affect. Thought content normal.      Assessment:      1. Post-menopausal bleeding          Plan:       Counseled patient on current symptoms and path.  Feel her continued bleeding is related to ehr AC and anti-platelet therapy, and that ost of what is in her uterus on US is mucous or blood or both.  Having said that, her persistent bleeding is a problem that has resulted in multiple evals and ED visits as well as minor surgical intervention.  I feel we are headed to hysterectomy at some point, and I therefore counseled her on such.  Will discuss her case with Dr. Fernandez, her cardiologist so as to determine need for bridging therapy surrounding surgery.  Will need to be done in [facility] given vascular history.  She and her  voiced understanding.  Will be  in touch with plan.

## 2022-11-16 ENCOUNTER — PATIENT OUTREACH (OUTPATIENT)
Dept: ADMINISTRATIVE | Facility: OTHER | Age: 82
End: 2022-11-16
Payer: MEDICARE

## 2022-11-16 VITALS
DIASTOLIC BLOOD PRESSURE: 64 MMHG | WEIGHT: 264 LBS | HEIGHT: 65 IN | SYSTOLIC BLOOD PRESSURE: 147 MMHG | HEART RATE: 49 BPM | RESPIRATION RATE: 18 BRPM | BODY MASS INDEX: 43.99 KG/M2 | OXYGEN SATURATION: 98 % | TEMPERATURE: 99 F

## 2022-11-16 LAB
BASOPHILS # BLD AUTO: 0.01 K/UL (ref 0–0.2)
BASOPHILS NFR BLD: 0.2 % (ref 0–1.9)
DIFFERENTIAL METHOD: ABNORMAL
EOSINOPHIL # BLD AUTO: 0 K/UL (ref 0–0.5)
EOSINOPHIL NFR BLD: 0 % (ref 0–8)
ERYTHROCYTE [DISTWIDTH] IN BLOOD BY AUTOMATED COUNT: 13.6 % (ref 11.5–14.5)
HCT VFR BLD AUTO: 32.1 % (ref 37–48.5)
HGB BLD-MCNC: 10.1 G/DL (ref 12–16)
IMM GRANULOCYTES # BLD AUTO: 0.02 K/UL (ref 0–0.04)
IMM GRANULOCYTES NFR BLD AUTO: 0.3 % (ref 0–0.5)
LYMPHOCYTES # BLD AUTO: 1 K/UL (ref 1–4.8)
LYMPHOCYTES NFR BLD: 16.1 % (ref 18–48)
MCH RBC QN AUTO: 27.5 PG (ref 27–31)
MCHC RBC AUTO-ENTMCNC: 31.5 G/DL (ref 32–36)
MCV RBC AUTO: 88 FL (ref 82–98)
MONOCYTES # BLD AUTO: 0.6 K/UL (ref 0.3–1)
MONOCYTES NFR BLD: 9.5 % (ref 4–15)
NEUTROPHILS # BLD AUTO: 4.7 K/UL (ref 1.8–7.7)
NEUTROPHILS NFR BLD: 73.9 % (ref 38–73)
NRBC BLD-RTO: 0 /100 WBC
PLATELET # BLD AUTO: 173 K/UL (ref 150–450)
PMV BLD AUTO: 8.7 FL (ref 9.2–12.9)
RBC # BLD AUTO: 3.67 M/UL (ref 4–5.4)
WBC # BLD AUTO: 6.39 K/UL (ref 3.9–12.7)

## 2022-11-16 PROCEDURE — 63600175 PHARM REV CODE 636 W HCPCS: Performed by: NURSE ANESTHETIST, CERTIFIED REGISTERED

## 2022-11-16 PROCEDURE — 25000003 PHARM REV CODE 250: Performed by: STUDENT IN AN ORGANIZED HEALTH CARE EDUCATION/TRAINING PROGRAM

## 2022-11-16 PROCEDURE — 63600175 PHARM REV CODE 636 W HCPCS

## 2022-11-16 PROCEDURE — 25000003 PHARM REV CODE 250: Performed by: NURSE ANESTHETIST, CERTIFIED REGISTERED

## 2022-11-16 PROCEDURE — 85025 COMPLETE CBC W/AUTO DIFF WBC: CPT

## 2022-11-16 PROCEDURE — 36415 COLL VENOUS BLD VENIPUNCTURE: CPT

## 2022-11-16 PROCEDURE — 94761 N-INVAS EAR/PLS OXIMETRY MLT: CPT

## 2022-11-16 RX ORDER — DEXTROMETHORPHAN HYDROBROMIDE, GUAIFENESIN 5; 100 MG/5ML; MG/5ML
650 LIQUID ORAL EVERY 8 HOURS PRN
Qty: 60 TABLET | Refills: 0 | Status: SHIPPED | OUTPATIENT
Start: 2022-11-16

## 2022-11-16 RX ORDER — CLOPIDOGREL BISULFATE 75 MG/1
75 TABLET ORAL DAILY
Status: DISCONTINUED | OUTPATIENT
Start: 2022-11-16 | End: 2022-11-16 | Stop reason: HOSPADM

## 2022-11-16 RX ORDER — HYDROCODONE BITARTRATE AND ACETAMINOPHEN 5; 325 MG/1; MG/1
1 TABLET ORAL EVERY 6 HOURS PRN
Qty: 20 TABLET | Refills: 0 | Status: SHIPPED | OUTPATIENT
Start: 2022-11-16 | End: 2023-07-20

## 2022-11-16 RX ADMIN — OXYCODONE 5 MG: 5 TABLET ORAL at 04:11

## 2022-11-16 RX ADMIN — KETOROLAC TROMETHAMINE 15 MG: 30 INJECTION, SOLUTION INTRAMUSCULAR; INTRAVENOUS at 06:11

## 2022-11-16 RX ADMIN — KETOROLAC TROMETHAMINE 15 MG: 30 INJECTION, SOLUTION INTRAMUSCULAR; INTRAVENOUS at 12:11

## 2022-11-16 RX ADMIN — LEVOTHYROXINE SODIUM 75 MCG: 75 TABLET ORAL at 06:11

## 2022-11-16 RX ADMIN — SIMETHICONE 80 MG: 80 TABLET, CHEWABLE ORAL at 10:11

## 2022-11-16 RX ADMIN — DEXTROSE 2 G: 50 INJECTION, SOLUTION INTRAVENOUS at 08:11

## 2022-11-16 NOTE — PLAN OF CARE
SW met with the patient at the bedside.     Patient is alert and oriented with no communication barriers.     Prior to admission patient is independent. Patient denies the use of HH or DME.     Patients PCP is Dr. Jelani Birmingham in [place], [place].     Patient choice pharmacy [facility].      Patients family will transport the patient home at discharge.     No CM needs identified at this time.     Patient will be discharged today.         11/16/22 1012   Discharge Planning   Assessment Type Discharge Planning Assessment   Resource/Environmental Concerns none   Support Systems Children   Equipment Currently Used at Home none   Current Living Arrangements home/apartment/condo   Patient/Family Anticipates Transition to home   Patient/Family Anticipated Services at Transition none   DME Needed Upon Discharge  none   Discharge Plan A Home with family

## 2022-11-16 NOTE — DISCHARGE SUMMARY
Discharge Summary  Gynecology      Admit Date: 11/15/2022    Discharge Date and Time: 2022     Attending Physician: Hugo Bradford MD    Principal Diagnoses: History of robot-assisted laparoscopic hysterectomy    Active Hospital Problems    Diagnosis  POA    *sp RA-TLH/BSO [Z90.710]  Not Applicable      Resolved Hospital Problems   No resolved problems to display.       Procedures: Procedure(s) (LRB):  XI ROBOTIC HYSTERECTOMY (N/A)  XI ROBOTIC SALPINGO-OOPHORECTOMY (Bilateral)    Discharged Condition: good    Hospital Course:   Adriana Murphy is a 82 y.o. y.o.  female who presented on 11/15/2022 for above procedures for the treatment of PMB. Patient tolerated procedure. Post-operative course was uncomplicated.    On day of discharge, patient was urinating, ambulating, and tolerating a regular diet without difficulty. Pain was well controlled on PO medication. She was discharged home on POD#1 in stable condition with instructions to follow up with Dr. Bradford in 4 weeks.     Consults: None    Significant Diagnostic Studies:  Recent Labs   Lab 22  0702   WBC 6.39   HGB 10.1*   HCT 32.1*   MCV 88           Treatments:   1. Surgery as above    Disposition: Home or Self Care    Patient Instructions:   Current Discharge Medication List        START taking these medications    Details   acetaminophen (TYLENOL) 650 MG TbSR Take 1 tablet (650 mg total) by mouth every 8 (eight) hours as needed (pain).  Qty: 60 tablet, Refills: 0      HYDROcodone-acetaminophen (NORCO) 5-325 mg per tablet Take 1 tablet by mouth every 6 (six) hours as needed for Pain.  Qty: 20 tablet, Refills: 0    Comments: Quantity prescribed more than 7 day supply? No           CONTINUE these medications which have NOT CHANGED    Details   acetaminophen 325 mg Cap Take 325 mg by mouth.      b complex vitamins tablet Take 1 tablet by mouth once daily.      cetirizine (ZYRTEC) 10 MG tablet Take 10 mg by mouth once daily.       cholecalciferol, vitamin D3, 1,250 mcg (50,000 unit) capsule Take 50,000 Units by mouth every 7 days.      EUTHYROX 75 mcg tablet Take 75 mcg by mouth once daily.      folic acid (FOLVITE) 1 MG tablet Take 1,000 mcg by mouth once daily.      furosemide (LASIX) 20 MG tablet Take 20 mg by mouth 2 (two) times daily.      hydroCHLOROthiazide (HYDRODIURIL) 25 MG tablet Take 25 mg by mouth once daily.      losartan (COZAAR) 50 MG tablet Take 50 mg by mouth once daily.      magnesium oxide-Mg AA chelate (MG-PLUS-PROTEIN) 133 mg Tab Take by mouth.      melatonin 1 mg Chew Take 1 tablet by mouth. 5MG      multivitamin capsule Take 1 capsule by mouth once daily.      ondansetron (ZOFRAN-ODT) 4 MG TbDL Take 4 mg by mouth every 8 (eight) hours as needed.      pantoprazole (PROTONIX) 40 MG tablet Take 40 mg by mouth 2 (two) times daily.      potassium chloride (K-TAB) 20 mEq Take 20 mEq by mouth once daily.      traMADoL (ULTRAM) 50 mg tablet Take 50 mg by mouth every 6 (six) hours as needed for Pain.      clopidogreL (PLAVIX) 75 mg tablet Take 75 mg by mouth once daily.      omega-3 fatty acids/fish oil (FISH OIL-OMEGA-3 FATTY ACIDS) 300-1,000 mg capsule Take 1 capsule by mouth once daily.           STOP taking these medications       medroxyPROGESTERone (PROVERA) 10 MG tablet Comments:   Reason for Stopping:               Discharge Procedure Orders   Diet general     Lifting restrictions   Order Comments: No lifting greater than 15 pounds for six weeks.     Other restrictions (specify):   Order Comments: PELVIC REST (IF YOU HAD A HYSTERECTOMY):  No douching, tampons, or intercourse for 6 weeks.    If prescribed, vaginal estrogen cream may be used during the postoperative period.     DRIVING:  No driving while on narcotics. Driving may be resumed initially with a competent passenger one to two weeks after surgery if no longer taking narcotics.     EXERCISE:  For six weeks your exercise should be limited to walking. You may  walk as far as you wish, as long as you increase your level of exertion gradually and avoid slippery surfaces. You may climb stairs as needed to get around, but should not use stair climbing for exercise.    TRAVEL: Stop every 1-1.5 hours to ambulate and void while traveling     Remove dressing in 24 hours   Order Comments: If you have a bandage on wound, you may remove it the day after dismissal.  If you had steri-strips remove them once they begin to peel off (usually 2 weeks).  If your steri-strips still haven't come off in 2 weeks, please remove them.  Keep incision clean and dry.  Inspect the incision daily for signs and symptoms of infection.     Wound care routine (specify)   Order Comments: WOUND CARE:  If you have a band-aid or bandage on your wound, you may remove it the day after dismissal.  You may wash the wound with mild soap and water.   You may shower at any time but should avoid immersing any abdominal incisions in water for at least two weeks after surgery or until the wound is completely healed. If given, please shower with Hibiclens soap until bottle is completely finished. Keep your wound clean and dry.  You should observe your incision for signs of infection which include redness, warmth, drainage or fever.     Call MD for:  temperature >100.4     Call MD for:  persistent nausea and vomiting     Call MD for:  severe uncontrolled pain     Call MD for:  difficulty breathing, headache or visual disturbances     Call MD for:  redness, tenderness, or signs of infection (pain, swelling, redness, odor or green/yellow discharge around incision site)     Call MD for:  hives     Call MD for:   Order Comments: inability to void,urine is ketchup colored or you have large clots, vaginal bleeding is heavier than a period.    VAGINAL DISCHARGE: You may develop a vaginal discharge and intermittent vaginal spotting after surgery and up to 6 weeks postoperatively.  The discharge may have an odor and may change  in color but it is normal.  This is due to dissolving stiches.  Contact your surgical team if you develop vaginal or vulvar irritation along with a discharge.  Also contact your surgical team if you have vaginal discharge that smells like urine or stool.    CONSTIPATION REMEDIES: Patients are often constipated after surgery or with use of oral narcotic medicine. You should continue to take the stool softener, Senokot-S during the next six weeks, and consume adequate amounts of water.  If you have not had a bowel movement for 3 days after dismissal, or are uncomfortable and unable to pass stool, please try one or all of the following measures:  1.  Milk of Magnesia - 30 cc by mouth every 12 hours   2.  Dulcolax suppository - One suppository per rectum every 4-6 hours   3.  Metamucil, Fibercon or other bulk former - use as directed  4.  Fleets Enema      PAIN MEDICATIONS:     Take your pain medications as instructed. It is best to take pain medications before your pain becomes severe. This will allow you to take less medication yet have better pain relief. For the first 2 or 3 days it may be helpful to take your pain medications on a regular schedule (e.g. every 4 to 6 hours). This will help you to keep your pain under better control. You should then begin to take fewer medications each day until you no longer need them. Do not take pain medication on an empty stomach. This may lead to nausea and vomiting.     Activity as tolerated   Order Comments: PELVIC REST:  No douching, tampons, or intercourse for 6 weeks.    If prescribed, vaginal estrogen cream may be used during the postoperative period.     DRIVING:  No driving while on narcotics. Driving may be resumed initially with a competent passenger one to two weeks after surgery if no longer taking narcotics.     EXERCISE:  For six weeks your exercise should be limited to walking. You may walk as far as you wish, as long as you increase your level of exertion  gradually and avoid slippery surfaces. You may climb stairs as needed to get around, but should not use stair climbing for exercise.     Shower on day dressing removed (No bath)   Order Comments: You may shower at any time but should avoid immersing any abdominal incisions in water for at least 2 weeks after surgery or until the wound is completely healed.  If given, please shower with Hibaclens soap until bottle is completely finish        Follow-up Information       Hugo Bradford MD Follow up in 4 week(s).    Specialties: Gynecologic Oncology, Gynecology, Oncology  Why: Post-op Visit  Contact information:  [address] [facility] [id number] [phone]                             Serenity Garcia MD  OBGYN, PGY-2

## 2022-11-16 NOTE — PLAN OF CARE
11/16/22 1258   Final Note   Assessment Type Final Discharge Note   Anticipated Discharge Disposition Home   Hospital Resources/Appts/Education Provided Provided patient/caregiver with written discharge plan information;Appointments scheduled and added to AVS;Appointments scheduled by Navigator/Coordinator   Post-Acute Status   Discharge Delays None known at this time     Patient to be discharged today. Her daughter is transporting her home.  She will stay with her daughter in [place] for one month after dc.    Patient has no needs at this time.

## 2022-11-16 NOTE — PROGRESS NOTES
[facility]  Gynecologic Oncology  Progress Note      Patient Name: Adriana Murphy  MRN: [id number]  Admission Date: 11/15/2022  Hospital Length of Stay: 1 days  Attending Provider: Hugo Bradford MD  Primary Care Provider: Primary Doctor No  Principal Problem: History of robot-assisted laparoscopic hysterectomy    Follow-up For: Procedure(s) (LRB):  XI ROBOTIC HYSTERECTOMY (N/A)  XI ROBOTIC SALPINGO-OOPHORECTOMY (Bilateral)  Post-Operative Day: 1 Day Post-Op  Subjective:     Interval History: POD #1 s/p RA-TLH/BSO. Patient doing well this morning. Denies N/V, able to tolerate PO. Pain well controlled with oral pain medications. Has not yet ambulated. Passing flatus. Voiding via suazo catheter.     Scheduled Meds:   levothyroxine  75 mcg Oral Before breakfast    simethicone  1 tablet Oral TID     Continuous Infusions:  PRN Meds:acetaminophen, hydrALAZINE, HYDROmorphone, ketorolac, ondansetron, oxyCODONE    Review of patient's allergies indicates:   Allergen Reactions    Cefazolin Rash     Other reaction(s): BLISTERS    Iodine Nausea And Vomiting and Rash      / CAN NOT EAT SHELLFISH CAN NOT USE BETADINE    Ibuprofen Rash     Almost all NSAIDS - CAN TAKE TYLENOL AND TORADOL    Aspirin      Other reaction(s): Rectal bleeding, Stomach pain    Chocolate flavor     Morphine Hallucinations     States she can take Tramadol and Codeine for pain    Propoxyphene Hallucinations    Sulfa (sulfonamide antibiotics)        Objective:     Vital Signs (Most Recent):  Temp: 98 °F (36.7 °C) (11/16/22 0444)  Pulse: (!) 57 (11/16/22 0444)  Resp: 18 (11/16/22 0444)  BP: 139/63 (11/16/22 0444)  SpO2: 96 % (11/16/22 0444)   Vital Signs (24h Range):  Temp:  [96.8 °F (36 °C)-98.7 °F (37.1 °C)] 98 °F (36.7 °C)  Pulse:  [57-79] 57  Resp:  [15-18] 18  SpO2:  [96 %-100 %] 96 %  BP: (125-172)/(58-93) 139/63     Weight: 119.7 kg (264 lb)  Body mass index is 43.93 kg/m².    Intake/Output - Last 3 Shifts         11/14  0700  11/15 0659 11/15 0700  11/16 0659    I.V. (mL/kg)  700 (5.8)    IV Piggyback  1200    Total Intake(mL/kg)  1900 (15.9)    Urine (mL/kg/hr)  1950    Blood  100    Total Output  2050    Net  -150                     Physical Exam:   Constitutional: She is oriented to person, place, and time. She appears well-developed and well-nourished.    HENT:   Head: Normocephalic and atraumatic.    Eyes: EOM are normal.     Cardiovascular:  Normal rate.             Pulmonary/Chest: Effort normal.        Abdominal: Soft. She exhibits abdominal incision (5 port incisions c/d/i with steri strips and bandaids). There is abdominal tenderness (appropriate postop tenderness). There is no rebound and no guarding.             Musculoskeletal: Normal range of motion.       Neurological: She is alert and oriented to person, place, and time.    Skin: Skin is warm and dry. She is not diaphoretic.    Psychiatric: She has a normal mood and affect. Her behavior is normal.     Lines/Drains/Airways       Drain  Duration                  Urethral Catheter 11/15/22 1213 Non-latex <1 day              Peripheral Intravenous Line  Duration                  Peripheral IV - Single Lumen 11/15/22 1122 20 G Left Antecubital <1 day                    Laboratory:  Recent Lab Results         11/15/22  0958        POCT Glucose 103            and All pertinent labs from the last 24 hours have been reviewed.    Assessment/Plan:     Active Diagnoses:    Diagnosis Date Noted POA    PRINCIPAL PROBLEM:  sp KRISTEL-NICK/IESHA [Z90.710] 11/15/2022 Not Applicable      Problems Resolved During this Admission:     VTE Risk Mitigation (From admission, onward)           Ordered     IP VTE HIGH RISK PATIENT  Once         11/15/22 1704     Place sequential compression device  Until discontinued         11/15/22 1704                  Post-op   - Routine post-op advances  - Continue PRN pain medications  - UOP adequate 0.6 cc/kg/hr > D/C suazo and perform spontaneous voiding  trial  - Encourage ambulation   - Encourage IS  - CBC pending this AM  - Continue IVF until tolerating regular diet  - Antiemetics prn nausea/vomiting    2. History of DVT/PE, PAD, PVD (bilateral LE stents)   - Patient on long-term anticoagulation with Plavix   - Will restart today pending AM CBC    3. Hypertension  - Home medications held   - Hydral PRN     4. Hypothyroidism   - Continue home synthroid     5. GERD   - Avoid oral NSAIDs      Serenity Garcia MD  Gynecologic Oncology  [facility]

## 2022-11-16 NOTE — BRIEF OP NOTE
[facility]  Brief Op Note  Short Stay    Admit Date: 11/15/2022    Attending Physician: Hugo Bradford MD     Surgery Date: 11/15/2022     Surgeon(s) and Role:     * Hugo Bradford MD - Primary     * Serenity Garcia MD - Resident - Assisting    Assisting Physician: Pauline Vides MD PGY3  Karie Molina, ALDO Garcia MD PGY2    Pre-op Diagnosis:  Post-menopausal bleeding [N95.0]    Post-op Diagnosis:  Post-Op Diagnosis Codes:     * Post-menopausal bleeding [N95.0]    Procedure(s) (LRB):  XI ROBOTIC HYSTERECTOMY (N/A)  XI ROBOTIC SALPINGO-OOPHORECTOMY (Bilateral)    Anesthesia: General    Findings/Key Components: Atropic appearing genitalia and cervix. Fibroid uterus, otherwise normal appearing. Normal appearing right and left fallopian tubes and ovaries. Refer to findings in op note for more details.     Estimated Blood Loss: 100 mL         Specimens:   Specimen (24h ago, onward)      None

## 2022-11-16 NOTE — ADDENDUM NOTE
Addendum  created 11/16/22 8740 by Nicolette Farris, CRNA    Intraprocedure Meds edited, Orders acknowledged in Narrator

## 2022-11-16 NOTE — PROGRESS NOTES
IP Liaison - Initial Visit Note    Patient: Adriana Murphy  MRN:  [id number]  Date of Service:  11/16/2022  Completed by:  FARHEEN Quezada    Reason for Visit   Patient presents with    IP Liaison Initial Visit       RSW met with patient at bedside in order to complete SDOH questionnaire and liaison assessment.  Pt has identified no barriers to care.  Patient will go to [place], [place] for a month and will follow up with PCP when she returns.  The following were addressed during this visit:  - Review SDOH Questions   - Complete patient assessment   - Complete initial visit with patient        Patient Summary     IP Liaison Patient Assessment    General  Level of Caregiver support: Member independent and does not need caregiver assistance  Have you had to make a decision between paying for any of the following in the last 2 months?: None  Transportation means: Family  Employment status: Retired and not working  Current symptoms: Sleep disturbances  Assessments  Was the PHQ Depression Screening completed this visit?: Yes  Was the DEJAN-7 Screening completed this visit?: No       IP Liaison - Final Visit Note    Patient: Adriana Murphy  MRN:  [id number]  Date of Service:  11/16/2022  Completed by:  FARHEEN Quezada    Reason for Visit   Patient presents with    IP Liaison Initial Visit       The following were addressed during this visit:  - Review SDOH Questions   - Complete patient assessment   - Complete initial visit with patient        Patient Summary     Discharge Date: 11/16/2022  Discharge telephone number/address: [phone]/[address] [address]  Follow up provider: Hugo Bradford MD  Follow up appointments: 01/03/2023, 11:45am  Home Health agency & telephone number: n/a  DME ordered &  name: n/a  Assigned OPCM RN/SW: n/a  Report sent to follow up team (PCP/OPCM) via in basket message: n/a  Community Resources arranged including agency name & contact info: n/a        Eric DAY  Brayden, YAJAIRAW

## 2022-11-17 NOTE — OP NOTE
DATE OF PROCEDURE:  11/15/2022     SURGEON: Hugo Bradford    ASSISTANTS: Pauline Vides MD; Karie Molina NP     PREOPERATIVE DIAGNOSES: Persistent PMB following ablation  Need for anticoagulation due to extensive vascular disease  Indwelling iliac vein stents  IVC filter  Right pelvic kidney  Uterine fibroids     POSTOPERATIVE DIAGNOSES: Same     PROCEDURES:  Robotic-assisted laparoscopic hysterectomy and bilateral   salpingo-oophorectomy, repair of vaginal lac inherent to procedure     COMPLICATIONS: None     ESTIMATED BLOOD LOSS: 50 cc     ANESTHESIA: GETA     INTRAOPERATIVE FINDINGS:  12 cm uterus with no intrapelvic or intraabdominal abnormal findings.  Normal bilateral tubes and ovaries.  Omental adhesions of the anterior abdominal wall, an umbilical hernia    PROCEDURE IN DETAIL: Informed consent was obtained and the patient was taken to   the Operating Suite.  General anesthesia was administered.  Once felt to be   adequate, she was placed in dorsal lithotomy position with her arms tucked.  The   abdomen and pelvis were prepped and draped in the usual fashion and a speculum   was placed in the vagina.  The cervix was visualized, grasped with a   single-tooth tenaculum and the uterus sounded to approximately 12 cm.    Serial dilation of cervix was performed and a medium VCare manipulator was   placed without difficulty.  Love catheter was placed to gravity drainage and   attention was turned to the abdominal portion of the procedure. A Veress needle was placed through the umbilicus and opening pressure was noted to be less than 4 .  Pneumoperitoneum was obtained with carbon dioxide and once this was felt to be adequate, an 8 mm   incision was made and a robotic trocar was placed through this.  Intraperitoneal   placement was confirmed with the camera.  Lateral robotic trocars x 3 were   placed through 8 mm incisions.  A right upper quadrant 8 mm AirSeal accessory   port was placed.  The patient was placed  in deep Trendelenburg.  The robot was   docked and instruments were passed in the operative field. Adhesions of the omentum were taken down. Attention was first turned to the left side   where the left round ligament was transected after sacrificing with bipolar   cautery.  The anterior and posterior leaflets of the broad ligament were opened.  The pararectal space was developed.  The ureter was identified and a window   was made beneath the infundibulopelvic ligament.  This was sacrificed with   bipolar cautery and transected.  The medial fold of the peritoneum was then   taken to the uterosacrals.  Attention was turned to the right side, which was   taken down in an identical manner.  Anteriorly, the vesicouterine peritoneum was  incised and the bladder was mobilized inferiorly over the ring.  A 10 o'clock   to 2 o'clock colpotomy was performed.  Posteriorly, a 4 o'clock to 8 o'clock   colpotomy was performed and bilateral cardinal ligaments and uterine vessels   skeletonized of their peritoneal attachments, sacrificed with bipolar cautery   and transected.  Circumferential colpotomy was completed and the uterus, cervix,   bilateral tubes and ovaries were removed.  Because of the size of the uterus it was bivalved and still there was a vaginal apex tear and introital tear that were unavoidable and inherent to the procedure.  These were closed in the usual manner. The cuff was then closed with a 0   PDS suture tied in the midline.  The pelvis was irrigated and noted to be   hemostatic.  Surgiflo was applied. Once hemostasis was confirmed, the   instruments were removed, the robot was undocked and the pneumoperitoneum was   evacuated.  The patient was flattened.  All ports were removed and port sites   were inspected and made hemostatic with electrocautery and closed with   subcuticular 4-0 Monocryl suture.  Steri-Strips and pressure dressing were   applied and the patient was awoken and taken to Recovery Room in  stable   condition.  Of note, I was present for and performed all key aspects of   procedure.  Karie Molina's expertise was needed as there was no qualified   resident available.

## 2022-11-18 ENCOUNTER — PATIENT OUTREACH (OUTPATIENT)
Dept: ADMINISTRATIVE | Facility: CLINIC | Age: 82
End: 2022-11-18
Payer: MEDICARE

## 2022-11-29 LAB
FINAL PATHOLOGIC DIAGNOSIS: NORMAL
GROSS: NORMAL
Lab: NORMAL

## 2022-11-30 ENCOUNTER — TELEPHONE (OUTPATIENT)
Dept: GYNECOLOGIC ONCOLOGY | Facility: CLINIC | Age: 82
End: 2022-11-30
Payer: MEDICARE

## 2022-11-30 NOTE — TELEPHONE ENCOUNTER
Called to let her know about path revealing a grade I endometrial cancer.  Minimal invasion and otherwise low-risk findings.  Will not need further treatment.  Recovering well from surgery.  Will see her as scheduled.

## 2022-12-01 NOTE — PHYSICIAN QUERY
PT Name: Adriana Murphy  MR #: [id number]    DOCUMENTATION CLARIFICATION     CDS/: IRENE Snow,RNC-MNN         Contact information:[email or web address]  This form is a permanent document in the medical record.     Query Date: December 1, 2022    By submitting this query, we are merely seeking further clarification of documentation.  Please utilize your independent clinical judgment when addressing the question(s) below.    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  SEE SYNOPTIC REPORT:   PROCEDURE: TOTAL HYSTERECTOMY AND BILATERAL SALPINGO-OOPHORECTOMY   TUMOR SIZE:  APPROXIMATELY 8 MM   HISTOLOGIC TYPE:  ENDOMETRIOID CARCINOMA, NOS   HISTOLOGIC GRADE: FIGO GRADE 1   MYOMETRIAL INVASION:  PRESENT, 2 MM WITH A MYOMETRIAL THICKNESS OF 12 MM (17%)   ADENOMYOSIS:  PRESENT, INVOLVED BY CARCINOMA   .   TUMOR STAGE: pT1a Nx    PROCEDURES:  Robotic-assisted laparoscopic hysterectomy and bilateral   salpingo-oophorectomy, repair of vaginal lac inherent to procedure    INTRAOPERATIVE FINDINGS:  12 cm uterus with no intrapelvic or intraabdominal abnormal findings.  Normal bilateral tubes and ovaries.  Omental adhesions of the anterior abdominal wall, an umbilical hernia Pathology report 11/15                                  Op note 11/17       Please clarify the pathology findings.  [ x ] Pathology findings noted above are ruled in/confirmed as diagnoses   [  ] Pathology findings noted above are not confirmed as diagnoses   [  ] Pathology findings noted above are incidental   [  ] Other diagnosis (please specify): ___________     Please document in your progress notes daily for the duration of treatment until resolved and include in your  discharge summary.    Form No. [id number]

## 2022-12-10 ENCOUNTER — HOSPITAL ENCOUNTER (EMERGENCY)
Facility: HOSPITAL | Age: 82
Discharge: HOME | End: 2022-12-11
Attending: EMERGENCY MEDICINE
Payer: MEDICARE

## 2022-12-10 ENCOUNTER — NURSE TRIAGE (OUTPATIENT)
Dept: ADMINISTRATIVE | Facility: CLINIC | Age: 82
End: 2022-12-10
Payer: MEDICARE

## 2022-12-10 DIAGNOSIS — Z90.710 STATUS POST HYSTERECTOMY: ICD-10-CM

## 2022-12-10 DIAGNOSIS — N95.0 POSTMENOPAUSAL VAGINAL BLEEDING: Primary | ICD-10-CM

## 2022-12-11 VITALS
SYSTOLIC BLOOD PRESSURE: 117 MMHG | RESPIRATION RATE: 16 BRPM | HEIGHT: 65 IN | BODY MASS INDEX: 43.32 KG/M2 | HEART RATE: 79 BPM | OXYGEN SATURATION: 99 % | WEIGHT: 260 LBS | DIASTOLIC BLOOD PRESSURE: 69 MMHG | TEMPERATURE: 98 F

## 2022-12-11 LAB
ALBUMIN SERPL-MCNC: 3.1 GM/DL (ref 3.4–4.8)
ALBUMIN/GLOB SERPL: 0.7 RATIO (ref 1.1–2)
ALP SERPL-CCNC: 98 UNIT/L (ref 40–150)
ALT SERPL-CCNC: 22 UNIT/L (ref 0–55)
APTT PPP: 28.8 SECONDS (ref 23.2–33.7)
AST SERPL-CCNC: 18 UNIT/L (ref 5–34)
BASOPHILS # BLD AUTO: 0.03 X10(3)/MCL (ref 0–0.2)
BASOPHILS NFR BLD AUTO: 0.4 %
BILIRUBIN DIRECT+TOT PNL SERPL-MCNC: 0.2 MG/DL
BUN SERPL-MCNC: 19 MG/DL (ref 9.8–20.1)
CALCIUM SERPL-MCNC: 9.2 MG/DL (ref 8.4–10.2)
CHLORIDE SERPL-SCNC: 103 MMOL/L (ref 98–107)
CO2 SERPL-SCNC: 30 MMOL/L (ref 23–31)
CREAT SERPL-MCNC: 1.13 MG/DL (ref 0.55–1.02)
EOSINOPHIL # BLD AUTO: 0.05 X10(3)/MCL (ref 0–0.9)
EOSINOPHIL NFR BLD AUTO: 0.6 %
ERYTHROCYTE [DISTWIDTH] IN BLOOD BY AUTOMATED COUNT: 14.6 % (ref 11.5–17)
GFR SERPLBLD CREATININE-BSD FMLA CKD-EPI: 49 MLS/MIN/1.73/M2
GLOBULIN SER-MCNC: 4.2 GM/DL (ref 2.4–3.5)
GLUCOSE SERPL-MCNC: 151 MG/DL (ref 82–115)
GROUP & RH: NORMAL
HCT VFR BLD AUTO: 30.9 % (ref 37–47)
HCT VFR BLD AUTO: 33.7 % (ref 37–47)
HGB BLD-MCNC: 10.2 GM/DL (ref 12–16)
HGB BLD-MCNC: 9.6 GM/DL (ref 12–16)
IMM GRANULOCYTES # BLD AUTO: 0.02 X10(3)/MCL (ref 0–0.04)
IMM GRANULOCYTES NFR BLD AUTO: 0.2 %
INDIRECT COOMBS GEL: NORMAL
INR BLD: 1.05 (ref 0–1.3)
LYMPHOCYTES # BLD AUTO: 1.94 X10(3)/MCL (ref 0.6–4.6)
LYMPHOCYTES NFR BLD AUTO: 23.4 %
MCH RBC QN AUTO: 26.5 PG (ref 27–31)
MCHC RBC AUTO-ENTMCNC: 30.3 MG/DL (ref 33–36)
MCV RBC AUTO: 87.5 FL (ref 80–94)
MONOCYTES # BLD AUTO: 0.78 X10(3)/MCL (ref 0.1–1.3)
MONOCYTES NFR BLD AUTO: 9.4 %
NEUTROPHILS # BLD AUTO: 5.5 X10(3)/MCL (ref 2.1–9.2)
NEUTROPHILS NFR BLD AUTO: 66 %
NRBC BLD AUTO-RTO: 0 %
PLATELET # BLD AUTO: 368 X10(3)/MCL (ref 130–400)
PMV BLD AUTO: 8.4 FL (ref 7.4–10.4)
POTASSIUM SERPL-SCNC: 4.3 MMOL/L (ref 3.5–5.1)
PROT SERPL-MCNC: 7.3 GM/DL (ref 5.8–7.6)
PROTHROMBIN TIME: 13.6 SECONDS (ref 12.5–14.5)
RBC # BLD AUTO: 3.85 X10(6)/MCL (ref 4.2–5.4)
SODIUM SERPL-SCNC: 141 MMOL/L (ref 136–145)
WBC # SPEC AUTO: 8.3 X10(3)/MCL (ref 4.5–11.5)

## 2022-12-11 PROCEDURE — 86901 BLOOD TYPING SEROLOGIC RH(D): CPT | Performed by: EMERGENCY MEDICINE

## 2022-12-11 PROCEDURE — 63600175 PHARM REV CODE 636 W HCPCS

## 2022-12-11 PROCEDURE — 85025 COMPLETE CBC W/AUTO DIFF WBC: CPT | Performed by: EMERGENCY MEDICINE

## 2022-12-11 PROCEDURE — 85014 HEMATOCRIT: CPT | Mod: 59 | Performed by: EMERGENCY MEDICINE

## 2022-12-11 PROCEDURE — 85730 THROMBOPLASTIN TIME PARTIAL: CPT | Performed by: EMERGENCY MEDICINE

## 2022-12-11 PROCEDURE — 85610 PROTHROMBIN TIME: CPT | Performed by: EMERGENCY MEDICINE

## 2022-12-11 PROCEDURE — 96374 THER/PROPH/DIAG INJ IV PUSH: CPT

## 2022-12-11 PROCEDURE — 96375 TX/PRO/DX INJ NEW DRUG ADDON: CPT

## 2022-12-11 PROCEDURE — 80053 COMPREHEN METABOLIC PANEL: CPT | Performed by: EMERGENCY MEDICINE

## 2022-12-11 PROCEDURE — 99285 EMERGENCY DEPT VISIT HI MDM: CPT | Mod: 25

## 2022-12-11 PROCEDURE — 96361 HYDRATE IV INFUSION ADD-ON: CPT

## 2022-12-11 PROCEDURE — 25000003 PHARM REV CODE 250: Performed by: EMERGENCY MEDICINE

## 2022-12-11 PROCEDURE — 63600175 PHARM REV CODE 636 W HCPCS: Performed by: EMERGENCY MEDICINE

## 2022-12-11 RX ORDER — SILVER NITRATE 38.21; 12.74 MG/1; MG/1
1 STICK TOPICAL
Status: COMPLETED | OUTPATIENT
Start: 2022-12-11 | End: 2022-12-11

## 2022-12-11 RX ORDER — MORPHINE SULFATE 4 MG/ML
INJECTION, SOLUTION INTRAMUSCULAR; INTRAVENOUS
Status: COMPLETED
Start: 2022-12-11 | End: 2022-12-11

## 2022-12-11 RX ORDER — ONDANSETRON 2 MG/ML
INJECTION INTRAMUSCULAR; INTRAVENOUS
Status: COMPLETED
Start: 2022-12-11 | End: 2022-12-11

## 2022-12-11 RX ORDER — ONDANSETRON 2 MG/ML
4 INJECTION INTRAMUSCULAR; INTRAVENOUS
Status: COMPLETED | OUTPATIENT
Start: 2022-12-11 | End: 2022-12-11

## 2022-12-11 RX ORDER — PANTOPRAZOLE SODIUM 40 MG/1
40 TABLET, DELAYED RELEASE ORAL DAILY
Status: DISCONTINUED | OUTPATIENT
Start: 2022-12-11 | End: 2022-12-11 | Stop reason: HOSPADM

## 2022-12-11 RX ORDER — MORPHINE SULFATE 4 MG/ML
4 INJECTION, SOLUTION INTRAMUSCULAR; INTRAVENOUS
Status: COMPLETED | OUTPATIENT
Start: 2022-12-11 | End: 2022-12-11

## 2022-12-11 RX ADMIN — PANTOPRAZOLE SODIUM 40 MG: 40 TABLET, DELAYED RELEASE ORAL at 08:12

## 2022-12-11 RX ADMIN — ONDANSETRON 4 MG: 2 INJECTION INTRAMUSCULAR; INTRAVENOUS at 03:12

## 2022-12-11 RX ADMIN — MORPHINE SULFATE 4 MG: 4 INJECTION INTRAVENOUS at 03:12

## 2022-12-11 RX ADMIN — SODIUM CHLORIDE, POTASSIUM CHLORIDE, SODIUM LACTATE AND CALCIUM CHLORIDE 1000 ML: 600; 310; 30; 20 INJECTION, SOLUTION INTRAVENOUS at 05:12

## 2022-12-11 RX ADMIN — MORPHINE SULFATE 4 MG: 4 INJECTION, SOLUTION INTRAMUSCULAR; INTRAVENOUS at 03:12

## 2022-12-11 RX ADMIN — SILVER NITRATE APPLICATORS 1 APPLICATOR: 25; 75 STICK TOPICAL at 03:12

## 2022-12-11 NOTE — ED PROVIDER NOTES
Encounter Date: 12/10/2022    SCRIBE #1 NOTE: I, Leila Chand, am scribing for, and in the presence of,  Janneth Maciel MD. I have scribed the following portions of the note - Other sections scribed: HPI, ROS, PE.     History     Chief Complaint   Patient presents with    Post-op Problem     Patient had robotic total abdominal hysterectomy on 11/15/22.  Started having vaginal bleeding with clots.  Currently on plavix.     An 82 year old female is presenting to the ED for vaginal bleeding. The pt is status-post hysterectomy on 11/15. The pt states that she began bleeding around 1930 today. She states that she had not experienced bleeding before today. She notes that she has also been having blood clots. She notes that the bleeding has not improved since it began. The pt states that she has abdominal pain and generalized weakness.     The history is provided by the patient. No  was used.   Vaginal Bleeding  This is a new problem. The current episode started 6 to 12 hours ago. The problem occurs constantly. The problem has not changed since onset.Associated symptoms include abdominal pain. Pertinent negatives include no chest pain, no headaches and no shortness of breath. Nothing aggravates the symptoms. Nothing relieves the symptoms. She has tried nothing for the symptoms. The treatment provided no relief.   Review of patient's allergies indicates:   Allergen Reactions    Cefazolin Rash     Other reaction(s): BLISTERS    Iodine Nausea And Vomiting and Rash      / CAN NOT EAT SHELLFISH CAN NOT USE BETADINE    Ibuprofen Rash     Almost all NSAIDS - CAN TAKE TYLENOL AND TORADOL    Aspirin      Other reaction(s): Rectal bleeding, Stomach pain    Chocolate flavor     Morphine Hallucinations     States she can take Tramadol and Codeine for pain    Propoxyphene Hallucinations    Sulfa (sulfonamide antibiotics)      Past Medical History:   Diagnosis Date    Anticoagulant long-term use     Arthritis      Asthma     DVT (deep vein thrombosis) in pregnancy     GERD (gastroesophageal reflux disease)     Hypertension     PAD (peripheral artery disease)     Post-menopausal bleeding     Pulmonary embolus     Thyroid disease     HYPO     Past Surgical History:   Procedure Laterality Date    APPENDECTOMY      btl      CATARACT EXTRACTION Bilateral     CHOLECYSTECTOMY      DILATION AND CURETTAGE OF UTERUS      EYE SURGERY      ivc filter      ROBOT-ASSISTED LAPAROSCOPIC ABDOMINAL HYSTERECTOMY USING DA JESSICA XI N/A 11/15/2022    Procedure: XI ROBOTIC HYSTERECTOMY;  Surgeon: Hugo Bradford MD;  Location: [facility] OR;  Service: OB/GYN;  Laterality: N/A;    ROBOT-ASSISTED LAPAROSCOPIC SALPINGO-OOPHORECTOMY USING DA JESSICA XI Bilateral 11/15/2022    Procedure: XI ROBOTIC SALPINGO-OOPHORECTOMY;  Surgeon: Hugo Bradford MD;  Location: [facility] OR;  Service: OB/GYN;  Laterality: Bilateral;    SHOULDER SURGERY Right 2017     History reviewed. No pertinent family history.  Social History     Tobacco Use    Smoking status: Never    Smokeless tobacco: Never   Substance Use Topics    Alcohol use: Not Currently    Drug use: Never     Review of Systems   Constitutional:  Negative for chills, diaphoresis and fever.   HENT:  Negative for congestion and sore throat.    Eyes:  Negative for visual disturbance.   Respiratory:  Negative for cough and shortness of breath.    Cardiovascular:  Negative for chest pain and palpitations.   Gastrointestinal:  Positive for abdominal pain. Negative for diarrhea, nausea and vomiting.   Genitourinary:  Positive for vaginal bleeding. Negative for dysuria and hematuria.   Skin:  Negative for rash.   Neurological:  Positive for weakness (generalized). Negative for syncope, numbness and headaches.   All other systems reviewed and are negative.    Physical Exam     Initial Vitals [12/10/22 2317]   BP Pulse Resp Temp SpO2   127/81 104 20 98.2 °F (36.8 °C) 96 %      MAP       --         Physical Exam    Constitutional: She  appears well-developed and well-nourished.   HENT:   Head: Normocephalic and atraumatic.   Right Ear: External ear normal.   Left Ear: External ear normal.   Nose: Nose normal. No rhinorrhea.   Mouth/Throat: Oropharynx is clear and moist.   Eyes: Conjunctivae are normal. Pupils are equal, round, and reactive to light.   Cardiovascular:  Normal rate and regular rhythm.           Pulmonary/Chest: Effort normal and breath sounds normal.   Abdominal: Abdomen is soft and flat. She exhibits no distension. There is generalized abdominal tenderness.   Mild generalized abdominal tenderness   Genitourinary:    Vaginal bleeding present.   There is bleeding in the vagina.    Genitourinary Comments: Sutures in place posteriorly with trickling of blood between, clots removed from vaginal vault      Musculoskeletal:         General: No edema. Normal range of motion.      Right lower leg: No edema.      Left lower leg: No edema.     Neurological: She is alert and oriented to person, place, and time.   Skin: Skin is warm and dry. Capillary refill takes less than 2 seconds. No rash noted. No pallor.   Psychiatric: She has a normal mood and affect. Her behavior is normal.       ED Course   Procedures  Labs Reviewed   COMPREHENSIVE METABOLIC PANEL - Abnormal; Notable for the following components:       Result Value    Glucose Level 151 (*)     Creatinine 1.13 (*)     Albumin Level 3.1 (*)     Globulin 4.2 (*)     Albumin/Globulin Ratio 0.7 (*)     All other components within normal limits   CBC WITH DIFFERENTIAL - Abnormal; Notable for the following components:    RBC 3.85 (*)     Hgb 10.2 (*)     Hct 33.7 (*)     MCH 26.5 (*)     MCHC 30.3 (*)     All other components within normal limits   HEMOGLOBIN AND HEMATOCRIT, BLOOD - Abnormal; Notable for the following components:    Hgb 9.6 (*)     Hct 30.9 (*)     All other components within normal limits   APTT - Normal   PROTIME-INR - Normal   CBC W/ AUTO DIFFERENTIAL    Narrative:     The  following orders were created for panel order CBC auto differential.  Procedure                               Abnormality         Status                     ---------                               -----------         ------                     CBC with Differential[[id number]]        Abnormal            Final result                 Please view results for these tests on the individual orders.   TYPE & SCREEN          Imaging Results              US Pelvis Limited Non OB (Final result)  Result time 12/11/22 10:16:28   Procedure changed from US Pelvis Complete Non OB     Final result by Desiree Burkett MD (12/11/22 10:16:28)                   Impression:      Questionable stool within the rectum versus small hematoma posterior to the bladder measuring 4.3 cm.    The preliminary and final reports are concordant.      Electronically signed by: Desiree Burkett  Date:    12/11/2022  Time:    10:16               Narrative:    EXAMINATION:  US PELVIS LIMITED NON OB    CLINICAL HISTORY:  vaginal bleeding post hysterectomy;    TECHNIQUE:  Limited ultrasound of the pelvis with attention to the bladder.    COMPARISON:  None    FINDINGS:  Normal sonographic appearance of the bladder.  No appreciable wall thickening or mass.    Postop hysterectomy.    Questionable stool within the rectum versus small hematoma posterior to the bladder measuring 4.3 cm.                        Preliminary result by Desiree Bukrett MD (12/11/22 08:01:09)                   Narrative:    START OF REPORT:  Technique: Transabdominal ultrasound of the pelvis was performed.    Comparison: None.    CLINICAL HISTORY: Vag bleed s/p hyst/bilat ooph pt in ER 30.    Findings:  Uterus: The uterus is not visualized, consistent with hysterectomy status.  Adnexa:  Right Ovary: The right ovary is not visualized, consistent with bilateral oophorectomy status.  Left Ovary: The left ovary is not visualized, consistent with bilateral oophorectomy  status.    Miscellaneous: There is a subtle ill defined heterogenous area posterior to the bladder with no definite peristaltic activity. This may reflect bowel versus a complex mass lesion.      Impression:  1. There is a subtle ill defined heterogenous area posterior to the bladder with no definite peristaltic activity. This may reflect bowel versus a complex mass lesion. Correlate clinically as regards additional evaluation and follow up.  2. Details and other findings as noted above.                          Preliminary result by Aroldo Juarez MD (12/11/22 08:01:09)                   Narrative:    START OF REPORT:  Technique: Transabdominal ultrasound of the pelvis was performed.    Comparison: None.    CLINICAL HISTORY: Vag bleed s/p hyst/bilat ooph pt in ER 30.    Findings:  Uterus: The uterus is not visualized, consistent with hysterectomy status.  Adnexa:  Right Ovary: The right ovary is not visualized, consistent with bilateral oophorectomy status.  Left Ovary: The left ovary is not visualized, consistent with bilateral oophorectomy status.    Miscellaneous: There is a subtle ill defined heterogenous area posterior to the bladder with no definite peristaltic activity. This may reflect bowel versus a complex mass lesion.      Impression:  1. There is a subtle ill defined heterogenous area posterior to the bladder with no definite peristaltic activity. This may reflect bowel versus a complex mass lesion. Correlate clinically as regards additional evaluation and follow up.  2. Details and other findings as noted above.                                         Medications   silver nitrate applicators applicator 1 applicator (1 applicator Topical (Top) Given by Provider 12/11/22 2896)   morphine injection 4 mg (4 mg Intravenous Given 12/11/22 0588)   ondansetron injection 4 mg (4 mg Intravenous Given 12/11/22 0348)   lactated ringers bolus 1,000 mL (0 mLs Intravenous Stopped 12/11/22 0685)     Medical Decision  Making:   History:   Old Records Summarized: records from another hospital and records from clinic visits.       <> Summary of Records: 11/30 gyn onc- Called to let her know about path revealing a grade I endometrial cancer.  Minimal invasion and otherwise low-risk findings.  Will not need further treatment.  Recovering well from surgery    11/15- robot-assisted laparoscopic hysterectomy by Dr Bradford   Initial Assessment:   Vaginal bleeding post hysterectomy, blood clots in underwear   Differential Diagnosis:   Cuff tear, post op bleeding, anemia, wound dehiscence   Clinical Tests:   Lab Tests: Ordered and Reviewed  The following lab test(s) were unremarkable: CBC and CMP  Radiological Study: Ordered and Reviewed  ED Management:  Sutures appear intact on pelvic exam but bleeding between  See ED course   Other:   I have discussed this case with another health care provider.        Scribe Attestation:   Scribe #1: I performed the above scribed service and the documentation accurately describes the services I performed. I attest to the accuracy of the note.    Attending Attestation:           Physician Attestation for Scribe:  Physician Attestation Statement for Scribe #1: I, Janneth Maciel MD, reviewed documentation, as scribed by Leila Chand in my presence, and it is both accurate and complete.           ED Course as of 12/13/22 0958   Sun Dec 11, 2022   0105 HEMOGLOBIN(!): 10.2  Hgb was 10.1 on 11/16/22   [KM]   0317 Spoke with Dr Hyman who recommends silver nitrate, will obs to ensure bleeding stops  [KM]   0457 Used silver nitrate to cauterize bleeding tissues [KM]   0541 Discussed with Dr Bonds who requests US  [KM]   0637 HEMOGLOBIN(!): 9.6  Stable, mild drop  [KM]   0801 Paged Dr. Bonds [RB]   0820 Small amount of vaginal bleeding but has improved [KM]   0902 Spoke Dr Bonds (GYN)- discussed ultrasound results and improvement of patient. He is okay with patient being discharged. Call Dr Frias tomorrow  morning and Dr Bradford as well for follow up [KM]   0908 Discussed results and treatment plan with patient.  She is comfortable with this.  We discussed in detail strict ED return precautions and I recommend she have a low activity level for the next few days. [KM]      ED Course User Index  [KM] Janneth Maciel MD  [RB] Leila Chand                 Clinical Impression:   Final diagnoses:  [N95.0] Postmenopausal vaginal bleeding (Primary)  [Z90.710] Status post hysterectomy        ED Disposition Condition    Discharge Stable          ED Prescriptions    None       Follow-up Information       Follow up With Specialties Details Why Contact Info    [facility] - Emergency Dept Emergency Medicine  As needed, If symptoms worsen [address] [place] [address] [phone]    Vargas Frias MD Obstetrics and Gynecology Call in 1 day For a follow up appointment [address]  [address]  [facility] [address]  [phone]      Hugo Bradford MD Gynecologic Oncology, Gynecology, Oncology Call in 1 day To tell them about her bleeding and get them to have a closer follow-up [facility] [id number] [phone]               Janneth Maciel MD  12/13/22 9510

## 2022-12-11 NOTE — TELEPHONE ENCOUNTER
Patient is having spontaneous onset on excessive bleeding after a full day of shopping. She is now wearing 2 pads. I have called to on call Resident who agrees that patient should go to the nearest ED. Called to patient and advised of confirmation to the ED.    Reason for Disposition   Sounds like a serious complication to the triager    Additional Information   Negative: Sounds like a life-threatening emergency to the triager    Protocols used: Post-Op Symptoms and [id number]

## 2022-12-11 NOTE — DISCHARGE INSTRUCTIONS
Keep a close watch on your bleeding.  You will likely spot some but you need to return to the emergency department if your bleeding increases like it was last night or review passed blood clots.  I would like for you to be less active for the next few days.

## 2022-12-13 NOTE — TELEPHONE ENCOUNTER
"Pt contacted navigator to update Dr Bradford on her bleeding and ER visit.     11/15/2022- robotic total abdominal hysterectomy and on Plavix    11/24/22- Pt reports falling out of bed x2 on the 24th of November in a 12 hour period. Pt states "I don't know why I fell out of bed. I had no problems or bleeding at that time."     12/10/2022- On Saturday Dec 10th she was shopping and began "hemorrhaging" [facility] to [facility]. Pt went to [facility]. Dr Bonds was on call and "cauterized the wound" (ED notes indicate silver nitrate was used to the source of bleeding) Pt was discharged the next day on 12/11/22.     Currently, pt denies pain and is having less bleeding. Pt scheduled on 1/3 for post-op visit with Dr Bradford. Pt advised to continue to monitor bleeding, bleeding precautions provided and pt advised to report the the ED if large amount of bleeding occurs again. Pt can be seen by Dr Bradford in [facility] prior to 1/3/23 or an evaluation with Dr Frias in [place] if needed. Pt verbalized understanding. Pt reports that she has a visit on Thursday (12/15) with Dr Birmingham who monitors her medications and approves her home health services. Pt confirmed Navigators direct number if needed for assistance.   "

## 2022-12-20 ENCOUNTER — HOSPITAL ENCOUNTER (INPATIENT)
Facility: HOSPITAL | Age: 82
LOS: 1 days | Discharge: ANOTHER HOSPITAL | DRG: 908 | End: 2022-12-21
Attending: EMERGENCY MEDICINE | Admitting: OBSTETRICS & GYNECOLOGY
Payer: MEDICARE

## 2022-12-20 ENCOUNTER — ANESTHESIA (OUTPATIENT)
Dept: SURGERY | Facility: HOSPITAL | Age: 82
DRG: 908 | End: 2022-12-20
Payer: MEDICARE

## 2022-12-20 ENCOUNTER — ANESTHESIA EVENT (OUTPATIENT)
Dept: SURGERY | Facility: HOSPITAL | Age: 82
DRG: 908 | End: 2022-12-20
Payer: MEDICARE

## 2022-12-20 DIAGNOSIS — N93.9 VAGINAL BLEEDING: ICD-10-CM

## 2022-12-20 DIAGNOSIS — Z90.710 HISTORY OF ROBOT-ASSISTED LAPAROSCOPIC HYSTERECTOMY: ICD-10-CM

## 2022-12-20 DIAGNOSIS — N93.9 VAGINAL HEMORRHAGE: ICD-10-CM

## 2022-12-20 DIAGNOSIS — D62 ACUTE BLOOD LOSS ANEMIA: Primary | ICD-10-CM

## 2022-12-20 PROBLEM — N95.0 POST-MENOPAUSAL BLEEDING: Status: RESOLVED | Noted: 2022-09-12 | Resolved: 2022-12-20

## 2022-12-20 LAB
ABO + RH BLD: NORMAL
ALBUMIN SERPL-MCNC: 2.9 G/DL (ref 3.4–4.8)
ALBUMIN/GLOB SERPL: 0.9 RATIO (ref 1.1–2)
ALP SERPL-CCNC: 91 UNIT/L (ref 40–150)
ALT SERPL-CCNC: 12 UNIT/L (ref 0–55)
ANION GAP SERPL CALC-SCNC: 16 MMOL/L (ref 8–16)
APPEARANCE UR: CLEAR
APTT PPP: 29.8 SECONDS (ref 23.2–33.7)
AST SERPL-CCNC: 12 UNIT/L (ref 5–34)
BACTERIA #/AREA URNS AUTO: NORMAL /HPF
BASOPHILS # BLD AUTO: 0.01 X10(3)/MCL (ref 0–0.2)
BASOPHILS # BLD AUTO: 0.01 X10(3)/MCL (ref 0–0.2)
BASOPHILS # BLD AUTO: 0.02 X10(3)/MCL (ref 0–0.2)
BASOPHILS NFR BLD AUTO: 0.2 %
BASOPHILS NFR BLD AUTO: 0.2 %
BASOPHILS NFR BLD AUTO: 0.5 %
BILIRUB UR QL STRIP.AUTO: NEGATIVE MG/DL
BILIRUBIN DIRECT+TOT PNL SERPL-MCNC: 0.3 MG/DL
BLD PROD TYP BPU: NORMAL
BLOOD UNIT EXPIRATION DATE: NORMAL
BLOOD UNIT TYPE CODE: 5100
BLOOD UNIT TYPE CODE: 9500
BLOOD UNIT TYPE CODE: 9500
BUN SERPL-MCNC: 16.4 MG/DL (ref 9.8–20.1)
BUN SERPL-MCNC: 17 MG/DL (ref 6–30)
CALCIUM SERPL-MCNC: 9 MG/DL (ref 8.4–10.2)
CHLORIDE SERPL-SCNC: 100 MMOL/L (ref 95–110)
CHLORIDE SERPL-SCNC: 105 MMOL/L (ref 98–107)
CO2 SERPL-SCNC: 26 MMOL/L (ref 23–31)
COLOR UR AUTO: YELLOW
CREAT SERPL-MCNC: 1 MG/DL (ref 0.55–1.02)
CREAT SERPL-MCNC: 1 MG/DL (ref 0.5–1.4)
CROSSMATCH INTERPRETATION: NORMAL
DISPENSE STATUS: NORMAL
EOSINOPHIL # BLD AUTO: 0.02 X10(3)/MCL (ref 0–0.9)
EOSINOPHIL # BLD AUTO: 0.02 X10(3)/MCL (ref 0–0.9)
EOSINOPHIL # BLD AUTO: 0.12 X10(3)/MCL (ref 0–0.9)
EOSINOPHIL NFR BLD AUTO: 0.3 %
EOSINOPHIL NFR BLD AUTO: 0.4 %
EOSINOPHIL NFR BLD AUTO: 3.2 %
ERYTHROCYTE [DISTWIDTH] IN BLOOD BY AUTOMATED COUNT: 15.8 % (ref 11–14.5)
ERYTHROCYTE [DISTWIDTH] IN BLOOD BY AUTOMATED COUNT: 15.8 % (ref 11–14.5)
ERYTHROCYTE [DISTWIDTH] IN BLOOD BY AUTOMATED COUNT: 15.9 % (ref 11–14.5)
GFR SERPLBLD CREATININE-BSD FMLA CKD-EPI: 57 MLS/MIN/1.73/M2
GLOBULIN SER-MCNC: 3.3 GM/DL (ref 2.4–3.5)
GLUCOSE SERPL-MCNC: 130 MG/DL (ref 82–115)
GLUCOSE SERPL-MCNC: 134 MG/DL (ref 70–110)
GLUCOSE UR QL STRIP.AUTO: NEGATIVE MG/DL
GROUP & RH: NORMAL
HCT VFR BLD AUTO: 25.9 % (ref 37–47)
HCT VFR BLD AUTO: 27.9 % (ref 37–47)
HCT VFR BLD AUTO: 28.4 % (ref 37–47)
HCT VFR BLD CALC: 30 %PCV (ref 36–54)
HGB BLD-MCNC: 10 G/DL
HGB BLD-MCNC: 7.9 GM/DL (ref 12–16)
HGB BLD-MCNC: 8.6 GM/DL (ref 12–16)
HGB BLD-MCNC: 8.7 GM/DL (ref 12–16)
IMM GRANULOCYTES # BLD AUTO: 0 X10(3)/MCL (ref 0–0.04)
IMM GRANULOCYTES # BLD AUTO: 0 X10(3)/MCL (ref 0–0.04)
IMM GRANULOCYTES # BLD AUTO: 0.01 X10(3)/MCL (ref 0–0.04)
IMM GRANULOCYTES NFR BLD AUTO: 0 %
IMM GRANULOCYTES NFR BLD AUTO: 0 %
IMM GRANULOCYTES NFR BLD AUTO: 0.2 %
INDIRECT COOMBS GEL: NORMAL
INR BLD: 1.09 (ref 0–1.3)
KETONES UR QL STRIP.AUTO: NEGATIVE MG/DL
LEUKOCYTE ESTERASE UR QL STRIP.AUTO: NEGATIVE UNIT/L
LYMPHOCYTES # BLD AUTO: 1.14 X10(3)/MCL (ref 0.6–4.6)
LYMPHOCYTES # BLD AUTO: 1.33 X10(3)/MCL (ref 0.6–4.6)
LYMPHOCYTES # BLD AUTO: 1.44 X10(3)/MCL (ref 0.6–4.6)
LYMPHOCYTES NFR BLD AUTO: 19.5 %
LYMPHOCYTES NFR BLD AUTO: 28.4 %
LYMPHOCYTES NFR BLD AUTO: 38.9 %
MCH RBC QN AUTO: 27 PG
MCH RBC QN AUTO: 27.1 PG
MCH RBC QN AUTO: 27.1 PG
MCHC RBC AUTO-ENTMCNC: 30.5 MG/DL (ref 33–36)
MCHC RBC AUTO-ENTMCNC: 30.6 MG/DL (ref 33–36)
MCHC RBC AUTO-ENTMCNC: 30.8 MG/DL (ref 33–36)
MCV RBC AUTO: 88 FL (ref 80–94)
MCV RBC AUTO: 88.2 FL (ref 80–94)
MCV RBC AUTO: 89 FL (ref 80–94)
MONOCYTES # BLD AUTO: 0.33 X10(3)/MCL (ref 0.1–1.3)
MONOCYTES # BLD AUTO: 0.5 X10(3)/MCL (ref 0.1–1.3)
MONOCYTES # BLD AUTO: 0.73 X10(3)/MCL (ref 0.1–1.3)
MONOCYTES NFR BLD AUTO: 12.5 %
MONOCYTES NFR BLD AUTO: 13.5 %
MONOCYTES NFR BLD AUTO: 7 %
NEUTROPHILS # BLD AUTO: 1.62 X10(3)/MCL (ref 2.1–9.2)
NEUTROPHILS # BLD AUTO: 3 X10(3)/MCL (ref 2.1–9.2)
NEUTROPHILS # BLD AUTO: 3.94 X10(3)/MCL (ref 2.1–9.2)
NEUTROPHILS NFR BLD AUTO: 43.9 %
NEUTROPHILS NFR BLD AUTO: 64 %
NEUTROPHILS NFR BLD AUTO: 67.3 %
NITRITE UR QL STRIP.AUTO: NEGATIVE
NRBC BLD AUTO-RTO: 0 % (ref 0–1)
PH UR STRIP.AUTO: 6.5 [PH]
PLATELET # BLD AUTO: 198 X10(3)/MCL (ref 140–371)
PLATELET # BLD AUTO: 218 X10(3)/MCL (ref 140–371)
PLATELET # BLD AUTO: 244 X10(3)/MCL (ref 140–371)
PMV BLD AUTO: 8.8 FL (ref 9.4–12.4)
PMV BLD AUTO: 8.9 FL (ref 9.4–12.4)
PMV BLD AUTO: 8.9 FL (ref 9.4–12.4)
POC IONIZED CALCIUM: 1.22 MMOL/L (ref 1.06–1.42)
POC TCO2 (MEASURED): 29 MMOL/L (ref 23–29)
POTASSIUM BLD-SCNC: 3.7 MMOL/L (ref 3.5–5.1)
POTASSIUM SERPL-SCNC: 3.8 MMOL/L (ref 3.5–5.1)
PROT SERPL-MCNC: 6.2 GM/DL (ref 5.8–7.6)
PROT UR QL STRIP.AUTO: NEGATIVE MG/DL
PROTHROMBIN TIME: 14 SECONDS (ref 12.5–14.5)
RBC # BLD AUTO: 2.91 X10(6)/MCL (ref 4.2–5.4)
RBC # BLD AUTO: 3.17 X10(6)/MCL (ref 4.2–5.4)
RBC # BLD AUTO: 3.22 X10(6)/MCL (ref 4.2–5.4)
RBC #/AREA URNS AUTO: <5 /HPF
RBC UR QL AUTO: NEGATIVE UNIT/L
SAMPLE: ABNORMAL
SODIUM BLD-SCNC: 141 MMOL/L (ref 136–145)
SODIUM SERPL-SCNC: 140 MMOL/L (ref 136–145)
SP GR UR STRIP.AUTO: 1.01 (ref 1–1.03)
SQUAMOUS #/AREA URNS AUTO: <5 /HPF
UNIT NUMBER: NORMAL
UROBILINOGEN UR STRIP-ACNC: 0.2 MG/DL
WBC # SPEC AUTO: 3.7 X10(3)/MCL (ref 4.5–11.5)
WBC # SPEC AUTO: 4.7 X10(3)/MCL (ref 4.5–11.5)
WBC # SPEC AUTO: 5.9 X10(3)/MCL (ref 4.5–11.5)
WBC #/AREA URNS AUTO: <5 /HPF

## 2022-12-20 PROCEDURE — 37000009 HC ANESTHESIA EA ADD 15 MINS: Performed by: OBSTETRICS & GYNECOLOGY

## 2022-12-20 PROCEDURE — 37000008 HC ANESTHESIA 1ST 15 MINUTES: Performed by: OBSTETRICS & GYNECOLOGY

## 2022-12-20 PROCEDURE — 85610 PROTHROMBIN TIME: CPT | Performed by: EMERGENCY MEDICINE

## 2022-12-20 PROCEDURE — P9016 RBC LEUKOCYTES REDUCED: HCPCS | Performed by: STUDENT IN AN ORGANIZED HEALTH CARE EDUCATION/TRAINING PROGRAM

## 2022-12-20 PROCEDURE — 25000003 PHARM REV CODE 250: Performed by: EMERGENCY MEDICINE

## 2022-12-20 PROCEDURE — 11000001 HC ACUTE MED/SURG PRIVATE ROOM

## 2022-12-20 PROCEDURE — 81001 URINALYSIS AUTO W/SCOPE: CPT | Performed by: STUDENT IN AN ORGANIZED HEALTH CARE EDUCATION/TRAINING PROGRAM

## 2022-12-20 PROCEDURE — 85025 COMPLETE CBC W/AUTO DIFF WBC: CPT | Performed by: STUDENT IN AN ORGANIZED HEALTH CARE EDUCATION/TRAINING PROGRAM

## 2022-12-20 PROCEDURE — P9016 RBC LEUKOCYTES REDUCED: HCPCS | Performed by: EMERGENCY MEDICINE

## 2022-12-20 PROCEDURE — 36000704 HC OR TIME LEV I 1ST 15 MIN: Performed by: OBSTETRICS & GYNECOLOGY

## 2022-12-20 PROCEDURE — P9035 PLATELET PHERES LEUKOREDUCED: HCPCS | Performed by: ANESTHESIOLOGY

## 2022-12-20 PROCEDURE — 99285 EMERGENCY DEPT VISIT HI MDM: CPT | Mod: 25

## 2022-12-20 PROCEDURE — 86923 COMPATIBILITY TEST ELECTRIC: CPT | Mod: 91 | Performed by: STUDENT IN AN ORGANIZED HEALTH CARE EDUCATION/TRAINING PROGRAM

## 2022-12-20 PROCEDURE — 86920 COMPATIBILITY TEST SPIN: CPT | Performed by: EMERGENCY MEDICINE

## 2022-12-20 PROCEDURE — 71000033 HC RECOVERY, INTIAL HOUR: Performed by: OBSTETRICS & GYNECOLOGY

## 2022-12-20 PROCEDURE — 36000707: Performed by: OBSTETRICS & GYNECOLOGY

## 2022-12-20 PROCEDURE — 63600175 PHARM REV CODE 636 W HCPCS: Performed by: STUDENT IN AN ORGANIZED HEALTH CARE EDUCATION/TRAINING PROGRAM

## 2022-12-20 PROCEDURE — 80053 COMPREHEN METABOLIC PANEL: CPT | Performed by: EMERGENCY MEDICINE

## 2022-12-20 PROCEDURE — 86850 RBC ANTIBODY SCREEN: CPT | Performed by: EMERGENCY MEDICINE

## 2022-12-20 PROCEDURE — 25000003 PHARM REV CODE 250: Performed by: NURSE ANESTHETIST, CERTIFIED REGISTERED

## 2022-12-20 PROCEDURE — 80047 BASIC METABLC PNL IONIZED CA: CPT

## 2022-12-20 PROCEDURE — 27201423 OPTIME MED/SURG SUP & DEVICES STERILE SUPPLY: Performed by: OBSTETRICS & GYNECOLOGY

## 2022-12-20 PROCEDURE — 36430 TRANSFUSION BLD/BLD COMPNT: CPT

## 2022-12-20 PROCEDURE — 63600175 PHARM REV CODE 636 W HCPCS: Performed by: NURSE ANESTHETIST, CERTIFIED REGISTERED

## 2022-12-20 PROCEDURE — 25000003 PHARM REV CODE 250

## 2022-12-20 PROCEDURE — 36415 COLL VENOUS BLD VENIPUNCTURE: CPT | Performed by: STUDENT IN AN ORGANIZED HEALTH CARE EDUCATION/TRAINING PROGRAM

## 2022-12-20 PROCEDURE — 63600175 PHARM REV CODE 636 W HCPCS: Performed by: ANESTHESIOLOGY

## 2022-12-20 PROCEDURE — P9017 PLASMA 1 DONOR FRZ W/IN 8 HR: HCPCS | Performed by: ANESTHESIOLOGY

## 2022-12-20 PROCEDURE — 85025 COMPLETE CBC W/AUTO DIFF WBC: CPT | Performed by: EMERGENCY MEDICINE

## 2022-12-20 PROCEDURE — 36000705 HC OR TIME LEV I EA ADD 15 MIN: Performed by: OBSTETRICS & GYNECOLOGY

## 2022-12-20 PROCEDURE — 36000706: Performed by: OBSTETRICS & GYNECOLOGY

## 2022-12-20 PROCEDURE — 85730 THROMBOPLASTIN TIME PARTIAL: CPT | Performed by: EMERGENCY MEDICINE

## 2022-12-20 PROCEDURE — 86923 COMPATIBILITY TEST ELECTRIC: CPT | Performed by: EMERGENCY MEDICINE

## 2022-12-20 RX ORDER — HYDROCODONE BITARTRATE AND ACETAMINOPHEN 5; 325 MG/1; MG/1
1 TABLET ORAL EVERY 4 HOURS PRN
Status: DISCONTINUED | OUTPATIENT
Start: 2022-12-20 | End: 2022-12-21 | Stop reason: HOSPADM

## 2022-12-20 RX ORDER — LEVOTHYROXINE SODIUM 25 UG/1
75 TABLET ORAL
Status: DISCONTINUED | OUTPATIENT
Start: 2022-12-21 | End: 2022-12-21 | Stop reason: HOSPADM

## 2022-12-20 RX ORDER — HYDROMORPHONE HYDROCHLORIDE 2 MG/ML
0.2 INJECTION, SOLUTION INTRAMUSCULAR; INTRAVENOUS; SUBCUTANEOUS EVERY 5 MIN PRN
Status: DISCONTINUED | OUTPATIENT
Start: 2022-12-20 | End: 2022-12-21 | Stop reason: HOSPADM

## 2022-12-20 RX ORDER — HYDROCODONE BITARTRATE AND ACETAMINOPHEN 500; 5 MG/1; MG/1
TABLET ORAL
Status: DISCONTINUED | OUTPATIENT
Start: 2022-12-20 | End: 2022-12-21 | Stop reason: HOSPADM

## 2022-12-20 RX ORDER — HYDROMORPHONE HYDROCHLORIDE 2 MG/ML
0.5 INJECTION, SOLUTION INTRAMUSCULAR; INTRAVENOUS; SUBCUTANEOUS EVERY 5 MIN PRN
Status: DISCONTINUED | OUTPATIENT
Start: 2022-12-20 | End: 2022-12-21 | Stop reason: HOSPADM

## 2022-12-20 RX ORDER — ACETAMINOPHEN 325 MG/1
650 TABLET ORAL EVERY 4 HOURS PRN
Status: DISCONTINUED | OUTPATIENT
Start: 2022-12-20 | End: 2022-12-21 | Stop reason: HOSPADM

## 2022-12-20 RX ORDER — EPHEDRINE SULFATE 50 MG/ML
INJECTION, SOLUTION INTRAVENOUS
Status: DISCONTINUED | OUTPATIENT
Start: 2022-12-20 | End: 2022-12-20

## 2022-12-20 RX ORDER — ONDANSETRON 4 MG/1
8 TABLET, ORALLY DISINTEGRATING ORAL EVERY 8 HOURS PRN
Status: DISCONTINUED | OUTPATIENT
Start: 2022-12-20 | End: 2022-12-21 | Stop reason: HOSPADM

## 2022-12-20 RX ORDER — MORPHINE SULFATE 4 MG/ML
4 INJECTION, SOLUTION INTRAMUSCULAR; INTRAVENOUS EVERY 4 HOURS PRN
Status: DISCONTINUED | OUTPATIENT
Start: 2022-12-20 | End: 2022-12-21 | Stop reason: HOSPADM

## 2022-12-20 RX ORDER — DIPHENHYDRAMINE HYDROCHLORIDE 50 MG/ML
25 INJECTION INTRAMUSCULAR; INTRAVENOUS EVERY 6 HOURS PRN
Status: DISCONTINUED | OUTPATIENT
Start: 2022-12-20 | End: 2022-12-21 | Stop reason: HOSPADM

## 2022-12-20 RX ORDER — PHENYLEPHRINE HYDROCHLORIDE 10 MG/ML
INJECTION INTRAVENOUS
Status: DISCONTINUED | OUTPATIENT
Start: 2022-12-20 | End: 2022-12-20

## 2022-12-20 RX ORDER — FENTANYL CITRATE 50 UG/ML
INJECTION, SOLUTION INTRAMUSCULAR; INTRAVENOUS
Status: DISCONTINUED | OUTPATIENT
Start: 2022-12-20 | End: 2022-12-20

## 2022-12-20 RX ORDER — FUROSEMIDE 10 MG/ML
20 INJECTION INTRAMUSCULAR; INTRAVENOUS ONCE
Status: COMPLETED | OUTPATIENT
Start: 2022-12-20 | End: 2022-12-20

## 2022-12-20 RX ORDER — PROPOFOL 10 MG/ML
VIAL (ML) INTRAVENOUS
Status: DISCONTINUED | OUTPATIENT
Start: 2022-12-20 | End: 2022-12-20

## 2022-12-20 RX ORDER — TRANEXAMIC ACID 100 MG/ML
INJECTION, SOLUTION INTRAVENOUS
Status: COMPLETED
Start: 2022-12-20 | End: 2022-12-20

## 2022-12-20 RX ORDER — CALCIUM CHLORIDE INJECTION 100 MG/ML
INJECTION, SOLUTION INTRAVENOUS
Status: DISCONTINUED | OUTPATIENT
Start: 2022-12-20 | End: 2022-12-20

## 2022-12-20 RX ORDER — DIAZEPAM 2 MG/1
2 TABLET ORAL
Status: COMPLETED | OUTPATIENT
Start: 2022-12-20 | End: 2022-12-20

## 2022-12-20 RX ORDER — MEPERIDINE HYDROCHLORIDE 25 MG/ML
12.5 INJECTION INTRAMUSCULAR; INTRAVENOUS; SUBCUTANEOUS ONCE
Status: COMPLETED | OUTPATIENT
Start: 2022-12-20 | End: 2022-12-20

## 2022-12-20 RX ORDER — ONDANSETRON 2 MG/ML
4 INJECTION INTRAMUSCULAR; INTRAVENOUS ONCE
Status: COMPLETED | OUTPATIENT
Start: 2022-12-20 | End: 2022-12-20

## 2022-12-20 RX ORDER — SUCCINYLCHOLINE CHLORIDE 20 MG/ML
INJECTION INTRAMUSCULAR; INTRAVENOUS
Status: DISCONTINUED | OUTPATIENT
Start: 2022-12-20 | End: 2022-12-20

## 2022-12-20 RX ORDER — DIPHENHYDRAMINE HYDROCHLORIDE 50 MG/ML
25 INJECTION INTRAMUSCULAR; INTRAVENOUS EVERY 4 HOURS PRN
Status: DISCONTINUED | OUTPATIENT
Start: 2022-12-20 | End: 2022-12-21 | Stop reason: HOSPADM

## 2022-12-20 RX ORDER — DIPHENHYDRAMINE HCL 25 MG
25 CAPSULE ORAL EVERY 4 HOURS PRN
Status: DISCONTINUED | OUTPATIENT
Start: 2022-12-20 | End: 2022-12-21 | Stop reason: HOSPADM

## 2022-12-20 RX ORDER — PROCHLORPERAZINE EDISYLATE 5 MG/ML
5 INJECTION INTRAMUSCULAR; INTRAVENOUS EVERY 6 HOURS PRN
Status: DISCONTINUED | OUTPATIENT
Start: 2022-12-20 | End: 2022-12-21 | Stop reason: HOSPADM

## 2022-12-20 RX ORDER — ROCURONIUM BROMIDE 10 MG/ML
INJECTION, SOLUTION INTRAVENOUS
Status: DISCONTINUED | OUTPATIENT
Start: 2022-12-20 | End: 2022-12-20

## 2022-12-20 RX ADMIN — TRANEXAMIC ACID 1000 MG: 100 INJECTION, SOLUTION INTRAVENOUS at 10:12

## 2022-12-20 RX ADMIN — MEPERIDINE HYDROCHLORIDE 12.5 MG: 25 INJECTION INTRAMUSCULAR; INTRAVENOUS; SUBCUTANEOUS at 12:12

## 2022-12-20 RX ADMIN — FUROSEMIDE 20 MG: 10 INJECTION, SOLUTION INTRAMUSCULAR; INTRAVENOUS at 08:12

## 2022-12-20 RX ADMIN — TRANEXAMIC ACID 1000 MG: 100 INJECTION, SOLUTION INTRAVENOUS at 09:12

## 2022-12-20 RX ADMIN — EPHEDRINE SULFATE 10 MG: 50 INJECTION INTRAVENOUS at 10:12

## 2022-12-20 RX ADMIN — PHENYLEPHRINE HYDROCHLORIDE 300 MCG: 10 INJECTION INTRAVENOUS at 09:12

## 2022-12-20 RX ADMIN — SODIUM CHLORIDE, SODIUM GLUCONATE, SODIUM ACETATE, POTASSIUM CHLORIDE AND MAGNESIUM CHLORIDE: 526; 502; 368; 37; 30 INJECTION, SOLUTION INTRAVENOUS at 09:12

## 2022-12-20 RX ADMIN — ROCURONIUM BROMIDE 25 MG: 10 INJECTION, SOLUTION INTRAVENOUS at 10:12

## 2022-12-20 RX ADMIN — DIAZEPAM 2 MG: 2 TABLET ORAL at 08:12

## 2022-12-20 RX ADMIN — MORPHINE SULFATE 4 MG: 4 INJECTION INTRAVENOUS at 05:12

## 2022-12-20 RX ADMIN — SUGAMMADEX 200 MG: 100 INJECTION, SOLUTION INTRAVENOUS at 11:12

## 2022-12-20 RX ADMIN — EPHEDRINE SULFATE 10 MG: 50 INJECTION INTRAVENOUS at 09:12

## 2022-12-20 RX ADMIN — FENTANYL CITRATE 100 MCG: 50 INJECTION, SOLUTION INTRAMUSCULAR; INTRAVENOUS at 09:12

## 2022-12-20 RX ADMIN — PROPOFOL 150 MG: 10 INJECTION, EMULSION INTRAVENOUS at 09:12

## 2022-12-20 RX ADMIN — CALCIUM CHLORIDE INJECTION 0.5 G: 100 INJECTION, SOLUTION INTRAVENOUS at 09:12

## 2022-12-20 RX ADMIN — FENTANYL CITRATE 100 MCG: 50 INJECTION, SOLUTION INTRAMUSCULAR; INTRAVENOUS at 11:12

## 2022-12-20 RX ADMIN — SUCCINYLCHOLINE CHLORIDE 120 MG: 20 INJECTION, SOLUTION INTRAMUSCULAR; INTRAVENOUS at 09:12

## 2022-12-20 RX ADMIN — EPHEDRINE SULFATE 20 MG: 50 INJECTION INTRAVENOUS at 11:12

## 2022-12-20 RX ADMIN — MORPHINE SULFATE 4 MG: 4 INJECTION INTRAVENOUS at 10:12

## 2022-12-20 RX ADMIN — SODIUM CHLORIDE, SODIUM GLUCONATE, SODIUM ACETATE, POTASSIUM CHLORIDE AND MAGNESIUM CHLORIDE: 526; 502; 368; 37; 30 INJECTION, SOLUTION INTRAVENOUS at 10:12

## 2022-12-20 RX ADMIN — ONDANSETRON 4 MG: 2 INJECTION INTRAMUSCULAR; INTRAVENOUS at 12:12

## 2022-12-20 NOTE — ED TRIAGE NOTES
Had hysterectomy on 11/15 by dr estes @ [facility] in [place]. Began having vag bleeding 1 week ago, seen in ed and had cauterization. Pt reports vaginal bleeding that began since waking up this morning. Denies pain. Taking plavix.

## 2022-12-20 NOTE — ED PROVIDER NOTES
Encounter Date: 12/20/2022    SCRIBE #1 NOTE: I, Corrie Broderick, am scribing for, and in the presence of,  Janneth Maciel DO. I have scribed the following portions of the note - Other sections scribed: HPI,ROS,PE.     History     Chief Complaint   Patient presents with    Vaginal Bleeding     Had hysterectomy on 11/15 by dr hooks @ [facility] in [place]. Began having vag bleeding 1 week ago, seen in ed and had cauterization. Pt reports vaginal bleeding that began since waking up this morning. Denies pain. Taking plavix.      Ms. Murphy is a 82-year-old female with past medical history of DVT, PAD, HTN, and PMB s/p robotic total abdominal hysterectomy on 11/15/22 by Hugo Hooks MD presenting to ED c/o vaginal bleeding onset this morning. Pt was seen here on 12/10 for the same symptom; silver nitrate was used to cauterize bleeding tissues. Pt was then discharged home with instructions to f/u with OB.  Pt notes due to controlled bleeding she did not have to follow up in clinic with Hugo Hooks MD until 1/03/23. States 3x days ago bleeding stopped, however, this morning, pt states she woke up feeling as if she wet herself and noticed a large amount of bleeding. She denies abdominal pain/cramping. States she felt light-headed/dizzy earlier, but symptom is now resolved. Pt is on Plavix.     The history is provided by the patient. No  was used.   Vaginal Bleeding  This is a recurrent problem. The current episode started less than 1 hour ago. The problem occurs constantly. Pertinent negatives include no chest pain, no headaches and no shortness of breath.   Review of patient's allergies indicates:   Allergen Reactions    Cefazolin Rash     Other reaction(s): BLISTERS    Iodine Nausea And Vomiting and Rash      / CAN NOT EAT SHELLFISH CAN NOT USE BETADINE    Ibuprofen Rash     Almost all NSAIDS - CAN TAKE TYLENOL AND TORADOL    Aspirin      Other reaction(s): Rectal bleeding, Stomach pain     Chocolate flavor     Morphine Hallucinations     States she can take Tramadol and Codeine for pain    Propoxyphene Hallucinations    Sulfa (sulfonamide antibiotics)      Past Medical History:   Diagnosis Date    Anticoagulant long-term use     Arthritis     Asthma     DVT (deep vein thrombosis) in pregnancy     GERD (gastroesophageal reflux disease)     Hypertension     PAD (peripheral artery disease)     Post-menopausal bleeding     Pulmonary embolus     Thyroid disease     HYPO     Past Surgical History:   Procedure Laterality Date    APPENDECTOMY      btl      CATARACT EXTRACTION Bilateral     CHOLECYSTECTOMY      DILATION AND CURETTAGE OF UTERUS      EYE SURGERY      ivc filter      ROBOT-ASSISTED LAPAROSCOPIC ABDOMINAL HYSTERECTOMY USING DA JESSICA XI N/A 11/15/2022    Procedure: XI ROBOTIC HYSTERECTOMY;  Surgeon: Hugo Bradford MD;  Location: [facility];  Service: OB/GYN;  Laterality: N/A;    ROBOT-ASSISTED LAPAROSCOPIC SALPINGO-OOPHORECTOMY USING DA JESSICA XI Bilateral 11/15/2022    Procedure: XI ROBOTIC SALPINGO-OOPHORECTOMY;  Surgeon: Hugo Bradford MD;  Location: [facility];  Service: OB/GYN;  Laterality: Bilateral;    SHOULDER SURGERY Right 2017     No family history on file.  Social History     Tobacco Use    Smoking status: Never    Smokeless tobacco: Never   Substance Use Topics    Alcohol use: Not Currently    Drug use: Never     Review of Systems   Constitutional:  Negative for chills, diaphoresis and fever.   HENT:  Negative for congestion and sore throat.    Eyes:  Negative for visual disturbance.   Respiratory:  Negative for cough and shortness of breath.    Cardiovascular:  Negative for chest pain and palpitations.   Gastrointestinal:  Negative for diarrhea, nausea and vomiting.   Genitourinary:  Positive for vaginal bleeding. Negative for dysuria and hematuria.   Skin:  Negative for rash.   Neurological:  Positive for dizziness and light-headedness. Negative for syncope, weakness, numbness and headaches.    All other systems reviewed and are negative.    Physical Exam     Initial Vitals [12/20/22 0727]   BP Pulse Resp Temp SpO2   119/68 98 16 97.9 °F (36.6 °C) 98 %      MAP       --         Physical Exam    Nursing note and vitals reviewed.  Constitutional: She appears well-developed and well-nourished. No distress.   HENT:   Head: Normocephalic and atraumatic.   Right Ear: External ear normal.   Left Ear: External ear normal.   Mouth/Throat: Oropharynx is clear and moist.   Eyes: Conjunctivae and EOM are normal. Pupils are equal, round, and reactive to light.   Neck: Neck supple. No tracheal deviation present.   Cardiovascular:  Normal rate, regular rhythm, normal heart sounds and intact distal pulses.           No murmur heard.  Pulmonary/Chest: Breath sounds normal. No respiratory distress.   Abdominal: Abdomen is soft. Bowel sounds are normal. She exhibits no distension. There is no abdominal tenderness.   No right CVA tenderness.  No left CVA tenderness.   Genitourinary:    Genitourinary Comments: Baseball size blood clot passed from vagina      Musculoskeletal:         General: Normal range of motion.      Cervical back: Neck supple.     Neurological: She is alert and oriented to person, place, and time. She has normal strength. No cranial nerve deficit or sensory deficit. GCS score is 15. GCS eye subscore is 4. GCS verbal subscore is 5. GCS motor subscore is 6.   Skin: Skin is warm and dry. Capillary refill takes less than 2 seconds. No rash noted.   Patient slightly pale   Psychiatric: Her behavior is normal. Her mood appears anxious.       ED Course   Critical Care    Date/Time: 12/20/2022 7:50 AM  Performed by: Janneth Maciel MD  Authorized by: Janneth Maciel MD   Direct patient critical care time: 45 minutes  Additional history critical care time: 10 minutes  Ordering / reviewing critical care time: 5 minutes  Documentation critical care time: 7 minutes  Consulting other physicians critical care time: 7  minutes  Consult with family critical care time: 5 minutes  Total critical care time (exclusive of procedural time) : 79 minutes  Critical care time was exclusive of separately billable procedures and treating other patients and teaching time.  Critical care was necessary to treat or prevent imminent or life-threatening deterioration of the following conditions: shock and circulatory failure.  Critical care was time spent personally by me on the following activities: blood draw for specimens, development of treatment plan with patient or surrogate, discussions with consultants, evaluation of patient's response to treatment, examination of patient, obtaining history from patient or surrogate, ordering and performing treatments and interventions, ordering and review of laboratory studies, re-evaluation of patient's condition and review of old charts.      Labs Reviewed   COMPREHENSIVE METABOLIC PANEL - Abnormal; Notable for the following components:       Result Value    Glucose Level 130 (*)     Albumin Level 2.9 (*)     Albumin/Globulin Ratio 0.9 (*)     All other components within normal limits   APTT - Normal   TYPE & SCREEN   ISTAT CHEM8          Imaging Results    None          Medications   0.9%  NaCl infusion (for blood administration) (has no administration in time range)   tranexamic acid (CYKLOKAPRON) 1,000 mg in sodium chloride 0.9 % 100 mL IVPB (MB+) (1,000 mg Intravenous New Bag 12/20/22 1000)   diazePAM tablet 2 mg (2 mg Oral Given 12/20/22 0800)   tranexamic acid (CYKLOKAPRON) 1,000 mg in sodium chloride 0.9 % 100 mL IVPB (MB+) ( Intravenous Stopped 12/20/22 1133)   tranexamic acid (CYKLOKAPRON) 1,000 mg/10 mL (100 mg/mL) injection Soln (1,000 mg  Given 12/20/22 0923)     Medical Decision Making:   History:   Old Records Summarized: records from previous admission(s).       <> Summary of Records: ED visit 12/10 for vaginal bleeding post hysterectomy. GYN consulted- silver nitrate stopped bleeding and  discharged to follow up outpatient   Initial Assessment:   Large blood clot just passed from vagina, no diaphoresis   Differential Diagnosis:   Cuff tear, post op bleeding, anemia, wound dehiscence   Clinical Tests:   Lab Tests: Ordered and Reviewed  ED Management:  See ED course   Other:   I have discussed this case with another health care provider.        Scribe Attestation:   Scribe #1: I performed the above scribed service and the documentation accurately describes the services I performed. I attest to the accuracy of the note.    Attending Attestation:           Physician Attestation for Scribe:  Physician Attestation Statement for Scribe #1: I, Janneth Maciel, DO, reviewed documentation, as scribed by Corrie Broderick in my presence, and it is both accurate and complete.           ED Course as of 12/20/22 1450   Tue Dec 20, 2022   0825 Went to perform pelvic exam and patient clammy and diaphoretic.  She has passed several more blood clots.  She was also hypotensive 90s over 60s.  Started IV fluid bolus, attempted to pack vagina with Kerlex. Will defer pelvic for now.  [KM]   0830 Paged Vargas Frias MD [DP]   0841 Call and Consult Dr. Frias GYN [DP]   0843 Spoke with Dr Frias who is coming to assess patient  [KM]   0848 Blood pressure has dropped to 80s over 50, emergent releasing transfused 2 units of blood. TXA IV. Kerlex saturated, removed and placed Quick Clot  [KM]   0902 Dr Frias at bedside  [KM]   0910 Dr Frias packed gauze again, will see if patient soaks through for deciding to take to OR or not. Requests hospitalist admit due to medical co-morbidities  [KM]   0915 Paged Hospitalist  [DP]   0938 Saturated pad so taken to OR  [KM]   0946 Spoke with hospitalist and will see in consult for medical management since patient is currently in OR  [KM]      ED Course User Index  [DP] Jewell Broderick  [KM] Janneth Maciel MD                   Clinical Impression:   Final diagnoses:  [N93.9] Vaginal  hemorrhage  [Z90.710] History of robot-assisted laparoscopic hysterectomy  [D62] Acute blood loss anemia (Primary)        ED Disposition Condition    Admit Stable                Janneth Maciel MD  12/20/22 6113

## 2022-12-20 NOTE — TRANSFER OF CARE
"Anesthesia Transfer of Care Note    Patient: Adriana Murphy    Procedure(s) Performed: Procedure(s) (LRB):  EXAM UNDER ANESTHESIA (N/A)  RE-EXPLORATION, FOR BLEEDING (N/A)    Patient location: PACU    Anesthesia Type: general    Transport from OR: Transported from OR on room air with adequate spontaneous ventilation    Post pain: adequate analgesia    Post assessment: no apparent anesthetic complications    Post vital signs: stable    Level of consciousness: sedated    Nausea/Vomiting: no nausea/vomiting    Complications: none    Transfer of care protocol was followed      Last vitals:   Visit Vitals  /64   Pulse 98   Temp 36.5 °C (97.7 °F)   Resp 13   Ht 5' 5" (1.651 m)   Wt 120.2 kg (265 lb)   SpO2 95%   BMI 44.10 kg/m²     "

## 2022-12-20 NOTE — ANESTHESIA POSTPROCEDURE EVALUATION
Anesthesia Post Evaluation    Patient: Adriana Murphy    Procedure(s) Performed: Procedure(s) (LRB):  EXAM UNDER ANESTHESIA (N/A)  RE-EXPLORATION, FOR BLEEDING (N/A)    Final Anesthesia Type: general      Patient location during evaluation: PACU  Patient participation: Yes- Able to Participate  Level of consciousness: awake and alert  Post-procedure vital signs: reviewed and stable  Pain management: adequate  Airway patency: patent      Anesthetic complications: no      Cardiovascular status: hemodynamically stable  Respiratory status: unassisted  Hydration status: euvolemic  Follow-up not needed.          Vitals Value Taken Time   /61 12/20/22 1142   Temp 36.5 °C (97.7 °F) 12/20/22 1132   Pulse 91 12/20/22 1149   Resp 14 12/20/22 1149   SpO2 93 % 12/20/22 1149   Vitals shown include unvalidated device data.      No case tracking events are documented in the log.      Pain/Los Score: Los Score: 9 (12/20/2022 11:27 AM)

## 2022-12-20 NOTE — ED NOTES
Bed: 17  Expected date: 12/20/22  Expected time:   Means of arrival:   Comments:  82 F vag bleed, +BT

## 2022-12-20 NOTE — ED NOTES
First unit of emergent blood started. See flowsheet for vitals. [id number], [id number] o neg, 1-11-23 expires. Checked with linh hirsch. Charge at bedside.

## 2022-12-20 NOTE — ANESTHESIA PREPROCEDURE EVALUATION
12/20/2022  Adriana Murphy is a 82 y.o., female   S/p Robot NAILA 11/15 in [place]  To ER for Vag bleeding  Hgb 9.8  H/o HTN, DVT/PE  Had cards clearance preop NAILA  Uneventful anesthestic.      Pre-op Assessment    I have reviewed the Patient Summary Reports.     I have reviewed the Nursing Notes. I have reviewed the NPO Status.   I have reviewed the Medications.     Review of Systems         Anesthesia Plan  Type of Anesthesia, risks & benefits discussed:    Anesthesia Type: Gen ETT  Intra-op Monitoring Plan: Standard ASA Monitors  Post Op Pain Control Plan: multimodal analgesia  Induction:  IV  Airway Plan: Direct, Post-Induction  Informed Consent: Informed consent signed with the Patient and all parties understand the risks and agree with anesthesia plan.  All questions answered. Patient consented to blood products? Yes  ASA Score: 2 Emergent  Day of Surgery Review of History & Physical: H&P Update referred to the surgeon/provider.    Ready For Surgery From Anesthesia Perspective.     .

## 2022-12-20 NOTE — ANESTHESIA PROCEDURE NOTES
Intubation    Date/Time: 12/20/2022 9:44 AM  Performed by: Samir Banuelos CRNA  Authorized by: Isabelle Lambert MD     Intubation:     Induction:  Intravenous    Intubated:  Postinduction    Mask Ventilation:  Easy mask    Attempts:  1    Attempted By:  CRNA    Method of Intubation:  Direct    Blade:  Morris 2    Laryngeal View Grade: Grade I - full view of cords      Difficult Airway Encountered?: No      Complications:  None    Airway Device:  Oral endotracheal tube    Airway Device Size:  7.5    Style/Cuff Inflation:  Cuffed (inflated to minimal occlusive pressure)    Tube secured:  23    Secured at:  The lips    Placement Verified By:  Capnometry    Complicating Factors:  None    Findings Post-Intubation:  BS equal bilateral

## 2022-12-20 NOTE — H&P
[facility] - Peri Services  History & Physical    SUBJECTIVE:     Chief Complaint/Reason for Admission: Vaginal bleeding    History of Present Illness:  Patient is a 82 y.o. female patient of mine presents with heavy vaginal bleeding.  Underwent RALH on 11/17/22 with Dr. Bradford with final path showing very early endometrial cancer.  Had some minor bleeding a week ago and was sent home from ER.My partner Vargas Frias was called urgently to the ER to evaluate her.  She was bleeding heavy with clots so decision was made to perform EUA.     PTA Medications   Medication Sig    acetaminophen (TYLENOL) 650 MG TbSR Take 1 tablet (650 mg total) by mouth every 8 (eight) hours as needed (pain).    acetaminophen 325 mg Cap Take 325 mg by mouth.    b complex vitamins tablet Take 1 tablet by mouth once daily.    cetirizine (ZYRTEC) 10 MG tablet Take 10 mg by mouth once daily.    cholecalciferol, vitamin D3, 1,250 mcg (50,000 unit) capsule Take 50,000 Units by mouth every 7 days.    clopidogreL (PLAVIX) 75 mg tablet Take 75 mg by mouth once daily.    EUTHYROX 75 mcg tablet Take 75 mcg by mouth once daily.    folic acid (FOLVITE) 1 MG tablet Take 1,000 mcg by mouth once daily.    furosemide (LASIX) 20 MG tablet Take 20 mg by mouth 2 (two) times daily.    hydroCHLOROthiazide (HYDRODIURIL) 25 MG tablet Take 25 mg by mouth once daily.    HYDROcodone-acetaminophen (NORCO) 5-325 mg per tablet Take 1 tablet by mouth every 6 (six) hours as needed for Pain.    losartan (COZAAR) 50 MG tablet Take 50 mg by mouth once daily.    magnesium oxide-Mg AA chelate (MG-PLUS-PROTEIN) 133 mg Tab Take by mouth.    melatonin 1 mg Chew Take 1 tablet by mouth. 5MG    multivitamin capsule Take 1 capsule by mouth once daily.    omega-3 fatty acids/fish oil (FISH OIL-OMEGA-3 FATTY ACIDS) 300-1,000 mg capsule Take 1 capsule by mouth once daily.    ondansetron (ZOFRAN-ODT) 4 MG TbDL Take 4 mg by mouth every 8 (eight) hours as needed.     pantoprazole (PROTONIX) 40 MG tablet Take 40 mg by mouth 2 (two) times daily.    potassium chloride (K-TAB) 20 mEq Take 20 mEq by mouth once daily.    traMADoL (ULTRAM) 50 mg tablet Take 50 mg by mouth every 6 (six) hours as needed for Pain.       Review of patient's allergies indicates:   Allergen Reactions    Cefazolin Rash     Other reaction(s): BLISTERS    Iodine Nausea And Vomiting and Rash      / CAN NOT EAT SHELLFISH CAN NOT USE BETADINE    Ibuprofen Rash     Almost all NSAIDS - CAN TAKE TYLENOL AND TORADOL    Aspirin      Other reaction(s): Rectal bleeding, Stomach pain    Chocolate flavor     Morphine Hallucinations     States she can take Tramadol and Codeine for pain    Propoxyphene Hallucinations    Sulfa (sulfonamide antibiotics)        Past Medical History:   Diagnosis Date    Anticoagulant long-term use     Arthritis     Asthma     DVT (deep vein thrombosis) in pregnancy     GERD (gastroesophageal reflux disease)     Hypertension     PAD (peripheral artery disease)     Post-menopausal bleeding     Pulmonary embolus     Thyroid disease     HYPO     Past Surgical History:   Procedure Laterality Date    APPENDECTOMY      btl      CATARACT EXTRACTION Bilateral     CHOLECYSTECTOMY      DILATION AND CURETTAGE OF UTERUS      EYE SURGERY      ivc filter      ROBOT-ASSISTED LAPAROSCOPIC ABDOMINAL HYSTERECTOMY USING DA JESSICA XI N/A 11/15/2022    Procedure: XI ROBOTIC HYSTERECTOMY;  Surgeon: Hugo Bradford MD;  Location: [facility];  Service: OB/GYN;  Laterality: N/A;    ROBOT-ASSISTED LAPAROSCOPIC SALPINGO-OOPHORECTOMY USING DA JESSICA XI Bilateral 11/15/2022    Procedure: XI ROBOTIC SALPINGO-OOPHORECTOMY;  Surgeon: Hugo Bradford MD;  Location: [facility];  Service: OB/GYN;  Laterality: Bilateral;    SHOULDER SURGERY Right 2017     No family history on file.  Social History     Tobacco Use    Smoking status: Never    Smokeless tobacco: Never   Substance Use Topics    Alcohol use: Not Currently    Drug use: Never         Review of Systems:      OBJECTIVE:     Vital Signs (Most Recent):  Temp: 97.7 °F (36.5 °C) (12/20/22 1132)  Pulse: 89 (12/20/22 1210)  Resp: 16 (12/20/22 1210)  BP: 115/60 (12/20/22 1210)  SpO2: 96 % (12/20/22 1210)    Physical Exam:  Morbidly obese  On exam table when initial exam by myself was performed.  Speculum exam performed by my partner in ER and clots removed with packing placed    Laboratory:  I have reviewed all pertinent lab results within the past 24 hours.    Diagnostic Results:  H/H 8.6/ 27.9    ASSESSMENT/PLAN:     83 yo female 4 weeks out from RAL with vaginal bleeding    Plan: Taken to OR for EUA

## 2022-12-20 NOTE — PROGRESS NOTES
Spoke with Dr. Bradford about the case. Discussed IR if bleeding picks up.  Called on call for IR at [facility] and on call does not perform vascular procedures.  Spoke with pt and family and discussed that if bleeding increases and if pt were to stay here I would likely need to do an open procedure as I have done everything I can from a vaginal perspective. Discussed this with Dr. Bradford and he accepted to take her in [place].  She has received 2 units PRBC's, 2 units of fibrinogen and one unit of platelets.  H/H being drawn now.  Will keep packing and suazo in at this time.  Packing dry at this point.

## 2022-12-20 NOTE — ED NOTES
Second unit of blood started. See flowsheet for vitals. [id number], [id number], expires 1-13-23. Doubled checked with linh hirsch. Charge at bedside.

## 2022-12-20 NOTE — ED NOTES
Pt is saturated with blood, vitals are stable. Dr guaman made aware. Pt and family updated on plan of care. Spoke with tricia hirsch in OR, pt being transported to or 10.

## 2022-12-21 ENCOUNTER — HOSPITAL ENCOUNTER (INPATIENT)
Facility: HOSPITAL | Age: 82
LOS: 2 days | Discharge: HOME | DRG: 756 | End: 2022-12-23
Attending: OBSTETRICS & GYNECOLOGY | Admitting: OBSTETRICS & GYNECOLOGY
Payer: MEDICARE

## 2022-12-21 VITALS
HEIGHT: 65 IN | DIASTOLIC BLOOD PRESSURE: 71 MMHG | OXYGEN SATURATION: 98 % | WEIGHT: 265 LBS | SYSTOLIC BLOOD PRESSURE: 115 MMHG | RESPIRATION RATE: 20 BRPM | BODY MASS INDEX: 44.15 KG/M2 | TEMPERATURE: 98 F | HEART RATE: 81 BPM

## 2022-12-21 DIAGNOSIS — N93.9 VAGINAL BLEEDING: Primary | ICD-10-CM

## 2022-12-21 DIAGNOSIS — Z90.710 HISTORY OF ROBOT-ASSISTED LAPAROSCOPIC HYSTERECTOMY: ICD-10-CM

## 2022-12-21 PROCEDURE — 25000003 PHARM REV CODE 250: Performed by: STUDENT IN AN ORGANIZED HEALTH CARE EDUCATION/TRAINING PROGRAM

## 2022-12-21 PROCEDURE — 63600175 PHARM REV CODE 636 W HCPCS: Performed by: STUDENT IN AN ORGANIZED HEALTH CARE EDUCATION/TRAINING PROGRAM

## 2022-12-21 PROCEDURE — 20600001 HC STEP DOWN PRIVATE ROOM

## 2022-12-21 RX ORDER — PANTOPRAZOLE SODIUM 40 MG/1
40 TABLET, DELAYED RELEASE ORAL 2 TIMES DAILY
Status: DISCONTINUED | OUTPATIENT
Start: 2022-12-21 | End: 2022-12-23 | Stop reason: HOSPADM

## 2022-12-21 RX ORDER — LOSARTAN POTASSIUM 50 MG/1
50 TABLET ORAL DAILY
Status: DISCONTINUED | OUTPATIENT
Start: 2022-12-22 | End: 2022-12-23 | Stop reason: HOSPADM

## 2022-12-21 RX ORDER — HYDROCHLOROTHIAZIDE 25 MG/1
25 TABLET ORAL DAILY
Status: DISCONTINUED | OUTPATIENT
Start: 2022-12-22 | End: 2022-12-23 | Stop reason: HOSPADM

## 2022-12-21 RX ORDER — ONDANSETRON 2 MG/ML
4 INJECTION INTRAMUSCULAR; INTRAVENOUS EVERY 8 HOURS PRN
Status: DISCONTINUED | OUTPATIENT
Start: 2022-12-21 | End: 2022-12-23 | Stop reason: HOSPADM

## 2022-12-21 RX ORDER — ACETAMINOPHEN 325 MG/1
650 TABLET ORAL EVERY 4 HOURS PRN
Status: DISCONTINUED | OUTPATIENT
Start: 2022-12-21 | End: 2022-12-23 | Stop reason: HOSPADM

## 2022-12-21 RX ORDER — LEVOTHYROXINE SODIUM 75 UG/1
75 TABLET ORAL
Status: DISCONTINUED | OUTPATIENT
Start: 2022-12-22 | End: 2022-12-23 | Stop reason: HOSPADM

## 2022-12-21 RX ORDER — OXYCODONE HYDROCHLORIDE 5 MG/1
5 TABLET ORAL EVERY 4 HOURS PRN
Status: DISCONTINUED | OUTPATIENT
Start: 2022-12-21 | End: 2022-12-23 | Stop reason: HOSPADM

## 2022-12-21 RX ORDER — SODIUM CHLORIDE, SODIUM LACTATE, POTASSIUM CHLORIDE, CALCIUM CHLORIDE 600; 310; 30; 20 MG/100ML; MG/100ML; MG/100ML; MG/100ML
INJECTION, SOLUTION INTRAVENOUS CONTINUOUS
Status: DISCONTINUED | OUTPATIENT
Start: 2022-12-22 | End: 2022-12-22

## 2022-12-21 RX ORDER — SODIUM CHLORIDE 0.9 % (FLUSH) 0.9 %
10 SYRINGE (ML) INJECTION
Status: DISCONTINUED | OUTPATIENT
Start: 2022-12-21 | End: 2022-12-23 | Stop reason: HOSPADM

## 2022-12-21 RX ORDER — TALC
3 POWDER (GRAM) TOPICAL NIGHTLY
Status: DISCONTINUED | OUTPATIENT
Start: 2022-12-21 | End: 2022-12-23 | Stop reason: HOSPADM

## 2022-12-21 RX ORDER — PROCHLORPERAZINE EDISYLATE 5 MG/ML
5 INJECTION INTRAMUSCULAR; INTRAVENOUS EVERY 6 HOURS PRN
Status: DISCONTINUED | OUTPATIENT
Start: 2022-12-21 | End: 2022-12-23 | Stop reason: HOSPADM

## 2022-12-21 RX ORDER — TRAMADOL HYDROCHLORIDE 50 MG/1
50 TABLET ORAL EVERY 4 HOURS PRN
Status: DISCONTINUED | OUTPATIENT
Start: 2022-12-21 | End: 2022-12-23 | Stop reason: HOSPADM

## 2022-12-21 RX ORDER — SIMETHICONE 80 MG
1 TABLET,CHEWABLE ORAL 3 TIMES DAILY PRN
Status: DISCONTINUED | OUTPATIENT
Start: 2022-12-21 | End: 2022-12-21 | Stop reason: HOSPADM

## 2022-12-21 RX ORDER — POTASSIUM CHLORIDE 20 MEQ/1
20 TABLET, EXTENDED RELEASE ORAL DAILY
Status: DISCONTINUED | OUTPATIENT
Start: 2022-12-22 | End: 2022-12-23 | Stop reason: HOSPADM

## 2022-12-21 RX ORDER — FUROSEMIDE 20 MG/1
20 TABLET ORAL 2 TIMES DAILY PRN
Status: DISCONTINUED | OUTPATIENT
Start: 2022-12-21 | End: 2022-12-23 | Stop reason: HOSPADM

## 2022-12-21 RX ADMIN — SIMETHICONE 80 MG: 80 TABLET, CHEWABLE ORAL at 08:12

## 2022-12-21 RX ADMIN — ONDANSETRON 8 MG: 4 TABLET, ORALLY DISINTEGRATING ORAL at 04:12

## 2022-12-21 RX ADMIN — MORPHINE SULFATE 4 MG: 4 INJECTION INTRAVENOUS at 08:12

## 2022-12-21 RX ADMIN — MORPHINE SULFATE 4 MG: 4 INJECTION INTRAVENOUS at 02:12

## 2022-12-21 NOTE — OP NOTE
[facility]                       [address] [place] [address]    PATIENT NAME:      ADRIANA ELLIOTT  YOB: 1940  CSN:               [id number]  MRN:               [id number]  ADMIT DATE:        12/20/2022 07:36:00  PHYSICIAN:         Mick Frias MD                          OPERATIVE REPORT      DATE OF SURGERY:    12/20/2022 00:00:00    SURGEON:  Mick Frias MD    PREOPERATIVE DIAGNOSIS:  Vaginal bleeding 4 weeks postoperatively from   robotic-assisted hysterectomy, currently on Plavix.    POSTOPERATIVE DIAGNOSIS:  Vaginal bleeding 4 weeks postoperatively from   robotic-assisted hysterectomy, currently on Plavix.    ASSISTANT SURGEON:  Vargas Frias.    PROCEDURES:    1. Exam under anesthesia.   2. Vaginal cuff repair.    COMPLICATIONS:   Bleeding.  Patient was given 2 units of packed red blood cells   before the procedure and was given 2 units of FFP and a unit of platelets during   the procedure.    SPECIMENS:  None.    BLOOD LOSS:  200 cc.    INDICATIONS FOR PROCEDURE:  Ms Adriana Elliott is a patient of mine who   underwent a robotic-assisted laparoscopic hysterectomy on November 17 in [place] under the care of Dr. Hugo Bradford due to her age, medical comorbidities,   and the possibility of endometrial cancer.  I reviewed personally this   operative note and it seemed to be without any significant problems during her   surgery.  She has been healing well, other than about a week ago, she had   presented to the ER.  One of my partners was on call.  The patient had some   vaginal bleeding that stopped.  It stopped but then it started progressively   getting worse the last few days and she presented to the emergency room with   fairly heavy bleeding.  The ER called my partner, Vargas Frias, as they   thought the patient was his so he went to evaluate her.  Cleared the vagina of   all  clots and debris and put a vaginal packing.  We were then notified within   the hour that the packing had been saturated so the decision was made to proceed   with an exam under anesthesia to fully evaluate this.    DESCRIPTION OF PROCEDURE:  Ms Adriana Murphy was taken to the operating room   with IV fluids running.  She was placed in a dorsal lithotomy position after   general anesthesia was administered.      Vargas Frias had begun the exam under anesthesia when I was walking into the   OR.  The findings of the exam were just an oozing vaginal cuff.  No apparent   arterial bleeding was noted.  Multiple interrupted sutures were placed into the   vaginal cuff and hemostasis was good.  The vagina was then irrigated and   suctioned out and we could see no further bleeding from the vaginal cuff.      Next, Ashley was placed on the vaginal cuff as well as a Kerlix packing, and a   Love catheter was then placed.  At the end of the case, hemostasis was   excellent.  There was no bleeding saturating the Kerlix.  The patient remained   stable throughout the case and received her blood products during it.  The   patient will be admitted overnight for observation.  I will give a call out to   her GYN oncologist, Dr. Bradford, as he is a primary surgeon and familiar with the   patient and get any further recs from him if this bleeding were to continue.  I   spoke with the patient's family over the phone as they reside in [place] and   discussed with the patient's daughter who is a critical care nurse and very well   read on medicine about what was going on with her mother. Discussed that if she were to continue to bleed after this minor surgery   then we may have to do more invasive surgeries but I will keep in touch   with them.  I just visited with Ms Adriana Murphy in the PACU.  She is in   stable condition and her pad has no blood on it.        ______________________________  MD BENI ChaseP/FLORINS  DD:   12/20/2022  Time:  12:57PM  DT:  12/20/2022  Time:  06:26PM  Job #:  [id number]      OPERATIVE REPORT

## 2022-12-21 NOTE — PROGRESS NOTES
Called by RN that patient has a bed at [facility].  Only mild spotting since packing removed.  Pt feeling well but tired.  Worried about going home and bleeding heavy again.  I spoke with pt and her daughter for over 30 minutes about the option to stay in [place] vs's getting transferred to  and the possible benefits/limitations of the transfer.  Pt's daughter is very educated on the situation and is a critical care nurse.  After a long discussion the pt and daughter, they would like to be transferred due to but not limited to 1.This is the patients second bleed  2. The extent of this bleed (requiring 3 units of blood and emergency surgery) 3. Limitations in [place] if heavy bleeding occurs again in the next 3-4 days 4. The possibility of a readmit and inability to get transfer in an expedited manner.      Pt and daughter are aware that her visit in  may be short if she remains stable and bleeding is lite.  Greatly appreciate the recs from Dr. Bradford during pt's time in [place] as I have been in contact with him since patients admission.

## 2022-12-21 NOTE — ASSESSMENT & PLAN NOTE
-12/20-21: [facility] EUA with vaginal cuff reinforcement, continued bleeding post surgery in PACU. Vaginal packing post procedure placed with suazo. Transfer to [facility] for possible need for IR intervention.    -In [place], received 2 u pRBC, 2 u FFP, 2 packs of platelets   -7.9/25.9/366 > products > 8.6/27.9/244  -12/22: Admit to [facility]. Vaginal bleeding stable. Patient denies further episodes of bleeding since vaginal packing removal yesterday. Endorses significant symptoms of anemia upon ambulation H/h today 7.3/23.5. Will transfuse additional unit pRBC today (total of 3 u pRBC) and initiate oral iron. Will remove suazo as bleeding stable and allow for diet. Continue strict pad counts

## 2022-12-21 NOTE — PROGRESS NOTES
Pt still admitted in [place] due to [facility] being full in [place].  Pack to come out this morning.  Original plan was to have packing removed at other facility due to possibility of increased bleeding after, however, with pt unable to be transferred at this time will need to removed here in [place].  Discussed this with patient and the possibility of another EUA/packing if bleeding increases.  Discussed with Suzette and will try to expedite transport to [place] if this were to occur.  If bleeding remains stable will keep overnight and have a possible discharge tomorrow.  Discussed this all with the patient and she will call her family and let them know.  Her daughter has my number if any questions should arise.  Of note she did receive one more unit of blood last night and I believe now her current H/H has equilibrated.  Total products are 3 units PRBC's, 2 units FFP, 1 platelet.      Packing removed at bedside.  Will monitor pad Q30 minutes and can space out if stable. Kate kept in for now

## 2022-12-21 NOTE — HPI
Adriana Murphy is a 81 yo with past medical history of DVT, PAD (s/p stents in bilateral LE and chronic Eliquis and Plavix use), HTN and now endometrial cancer who presented to [place] ED with vaginal bleeding one month post op from RA-TLH/BSO. She was immediately taken to the OR for EUA and vaginal cuff was reinforced. Bleeding seemed controlled, but then patient started to have bleeding again, thus a vaginal packing and suazo placed. Patient received 2 u PRBC, 2 u FFP and 1 pack of platelets. IR not available at [place], therefore transferred to [facility] for further evaluation.     Patient denies significant vaginal bleeding since packing removed yesterday. She endorses being lightheaded/dizzy upon standing and ambulating as well as fatigue. Denies anything in the vagina including intercourse since surgery. Endorses significant constipation, last BM Sunday. Denies nausea/vomiting. Denies abdominal pain, fever, or chills.     Onc History  -11/15/2022: RA-TLH/BSO. Pathology: Endometriod endometrial cancer. Grade 1, Stage 1A.  -12/20/22: Presented with vaginal bleeding, EUA with vaginal cuff reinforced.

## 2022-12-21 NOTE — SUBJECTIVE & OBJECTIVE
Past Medical History:   Diagnosis Date    Anticoagulant long-term use     Arthritis     Asthma     DVT (deep vein thrombosis) in pregnancy     GERD (gastroesophageal reflux disease)     Hypertension     PAD (peripheral artery disease)     Post-menopausal bleeding     Pulmonary embolus     Thyroid disease     HYPO     Past Surgical History:   Procedure Laterality Date    APPENDECTOMY      btl      CATARACT EXTRACTION Bilateral     CHOLECYSTECTOMY      DILATION AND CURETTAGE OF UTERUS      EYE SURGERY      ivc filter      ROBOT-ASSISTED LAPAROSCOPIC ABDOMINAL HYSTERECTOMY USING DA JESSICA XI N/A 11/15/2022    Procedure: XI ROBOTIC HYSTERECTOMY;  Surgeon: Hugo Bradford MD;  Location: [facility];  Service: OB/GYN;  Laterality: N/A;    ROBOT-ASSISTED LAPAROSCOPIC SALPINGO-OOPHORECTOMY USING DA JESSICA XI Bilateral 11/15/2022    Procedure: XI ROBOTIC SALPINGO-OOPHORECTOMY;  Surgeon: Hugo Bradford MD;  Location: [facility] OR;  Service: OB/GYN;  Laterality: Bilateral;    SHOULDER SURGERY Right 2017     Family History    None       Tobacco Use    Smoking status: Never    Smokeless tobacco: Never   Substance and Sexual Activity    Alcohol use: Not Currently    Drug use: Never    Sexual activity: Not Currently     Partners: Male       PTA Medications   Medication Sig    acetaminophen (TYLENOL) 650 MG TbSR Take 1 tablet (650 mg total) by mouth every 8 (eight) hours as needed (pain).    acetaminophen 325 mg Cap Take 325 mg by mouth.    b complex vitamins tablet Take 1 tablet by mouth once daily.    cetirizine (ZYRTEC) 10 MG tablet Take 10 mg by mouth once daily.    cholecalciferol, vitamin D3, 1,250 mcg (50,000 unit) capsule Take 50,000 Units by mouth every 7 days.    clopidogreL (PLAVIX) 75 mg tablet Take 75 mg by mouth once daily.    EUTHYROX 75 mcg tablet Take 75 mcg by mouth once daily.    folic acid (FOLVITE) 1 MG tablet Take 1,000 mcg by mouth once daily.    furosemide (LASIX) 20 MG tablet Take 20 mg by mouth 2 (two) times daily.     hydroCHLOROthiazide (HYDRODIURIL) 25 MG tablet Take 25 mg by mouth once daily.    HYDROcodone-acetaminophen (NORCO) 5-325 mg per tablet Take 1 tablet by mouth every 6 (six) hours as needed for Pain.    losartan (COZAAR) 50 MG tablet Take 50 mg by mouth once daily.    magnesium oxide-Mg AA chelate (MG-PLUS-PROTEIN) 133 mg Tab Take by mouth.    melatonin 1 mg Chew Take 1 tablet by mouth. 5MG    multivitamin capsule Take 1 capsule by mouth once daily.    omega-3 fatty acids/fish oil (FISH OIL-OMEGA-3 FATTY ACIDS) 300-1,000 mg capsule Take 1 capsule by mouth once daily.    ondansetron (ZOFRAN-ODT) 4 MG TbDL Take 4 mg by mouth every 8 (eight) hours as needed.    pantoprazole (PROTONIX) 40 MG tablet Take 40 mg by mouth 2 (two) times daily.    potassium chloride (K-TAB) 20 mEq Take 20 mEq by mouth once daily.    traMADoL (ULTRAM) 50 mg tablet Take 50 mg by mouth every 6 (six) hours as needed for Pain.       Review of patient's allergies indicates:   Allergen Reactions    Cefazolin Rash     Other reaction(s): BLISTERS    Iodine Nausea And Vomiting and Rash      / CAN NOT EAT SHELLFISH CAN NOT USE BETADINE    Ibuprofen Rash     Almost all NSAIDS - CAN TAKE TYLENOL AND TORADOL    Aspirin      Other reaction(s): Rectal bleeding, Stomach pain    Chocolate flavor     Morphine Hallucinations     States she can take Tramadol and Codeine for pain    Propoxyphene Hallucinations    Sulfa (sulfonamide antibiotics)        Review of Systems   Constitutional:  Negative for chills and fever.   Eyes:  Negative for visual disturbance.   Respiratory:  Negative for cough.    Cardiovascular:  Negative for chest pain, palpitations and leg swelling.   Gastrointestinal:  Negative for abdominal pain, constipation, diarrhea, nausea and vomiting.   Genitourinary:  Positive for vaginal bleeding.   Musculoskeletal:  Negative for arthralgias.   Neurological:  Negative for headaches.    Objective:     Vital Signs (Most Recent):    Vital Signs (24h  Range):  Temp:  [93.9 °F (34.4 °C)-98.4 °F (36.9 °C)] 97.9 °F (36.6 °C)  Pulse:  [59-98] 92  Resp:  [12-22] 20  SpO2:  [93 %-100 %] 99 %  BP: ()/(56-74) 115/67        There is no height or weight on file to calculate BMI.    Physical Exam:   Constitutional: She is oriented to person, place, and time. She appears well-developed and well-nourished. No distress.    HENT:   Head: Normocephalic and atraumatic.    Eyes: Pupils are equal, round, and reactive to light. EOM are normal.     Cardiovascular:  Normal rate.             Pulmonary/Chest: Effort normal. No respiratory distress.        Abdominal: Soft. She exhibits abdominal incision (5 healing port site incisions). She exhibits no distension. There is no abdominal tenderness. There is no rebound and no guarding.     Genitourinary:    Genitourinary Comments: Pad 10% saturated with old blood. No active bleeding noted. Love in place and draining clear urine.             Musculoskeletal: Normal range of motion and moves all extremeties. No tenderness or edema.       Neurological: She is alert and oriented to person, place, and time.    Skin: Skin is warm and dry. She is not diaphoretic.      Laboratory:  BMP:   Recent Labs   Lab 12/20/22  0825      K 3.8   CO2 26   BUN 16.4   CREATININE 1.00   CALCIUM 9.0   , CBC:   Recent Labs   Lab 12/20/22  0825 12/20/22  0846 12/20/22  1336   WBC 3.7*  --  5.9   HGB 8.6*  --  7.9*   HCT 27.9* 30* 25.9*     --  218   , CMP:   Recent Labs   Lab 12/20/22  0825      K 3.8   CO2 26   BUN 16.4   CREATININE 1.00   CALCIUM 9.0   ALBUMIN 2.9*   BILITOT 0.3   ALKPHOS 91   AST 12   ALT 12     Urine Studies:   Recent Labs   Lab 12/20/22  1640   APPEARANCEUA Clear   PROTEINUA Negative   BILIRUBINUA Negative   UROBILINOGEN 0.2   LEUKOCYTESUR Negative   RBCUA <5   WBCUA <5   BACTERIA None Seen

## 2022-12-22 LAB
ABO + RH BLD: NORMAL
ANION GAP SERPL CALC-SCNC: 4 MMOL/L (ref 8–16)
BLD GP AB SCN CELLS X3 SERPL QL: NORMAL
BLD PROD TYP BPU: NORMAL
BLOOD UNIT EXPIRATION DATE: NORMAL
BLOOD UNIT TYPE CODE: 5100
BLOOD UNIT TYPE: NORMAL
BUN SERPL-MCNC: 10 MG/DL (ref 8–23)
CALCIUM SERPL-MCNC: 8.1 MG/DL (ref 8.7–10.5)
CHLORIDE SERPL-SCNC: 106 MMOL/L (ref 95–110)
CO2 SERPL-SCNC: 28 MMOL/L (ref 23–29)
CODING SYSTEM: NORMAL
CREAT SERPL-MCNC: 0.8 MG/DL (ref 0.5–1.4)
DISPENSE STATUS: NORMAL
ERYTHROCYTE [DISTWIDTH] IN BLOOD BY AUTOMATED COUNT: 16.2 % (ref 11.5–14.5)
EST. GFR  (NO RACE VARIABLE): >60 ML/MIN/1.73 M^2
GLUCOSE SERPL-MCNC: 107 MG/DL (ref 70–110)
HCT VFR BLD AUTO: 23.5 % (ref 37–48.5)
HGB BLD-MCNC: 7.3 G/DL (ref 12–16)
MCH RBC QN AUTO: 27.3 PG (ref 27–31)
MCHC RBC AUTO-ENTMCNC: 31.1 G/DL (ref 32–36)
MCV RBC AUTO: 88 FL (ref 82–98)
PLATELET # BLD AUTO: 176 K/UL (ref 150–450)
PMV BLD AUTO: 9.4 FL (ref 9.2–12.9)
POTASSIUM SERPL-SCNC: 3.7 MMOL/L (ref 3.5–5.1)
RBC # BLD AUTO: 2.67 M/UL (ref 4–5.4)
SODIUM SERPL-SCNC: 138 MMOL/L (ref 136–145)
TRANS ERYTHROCYTES VOL PATIENT: NORMAL ML
WBC # BLD AUTO: 6.31 K/UL (ref 3.9–12.7)

## 2022-12-22 PROCEDURE — P9021 RED BLOOD CELLS UNIT: HCPCS | Performed by: STUDENT IN AN ORGANIZED HEALTH CARE EDUCATION/TRAINING PROGRAM

## 2022-12-22 PROCEDURE — 86920 COMPATIBILITY TEST SPIN: CPT | Performed by: STUDENT IN AN ORGANIZED HEALTH CARE EDUCATION/TRAINING PROGRAM

## 2022-12-22 PROCEDURE — 94761 N-INVAS EAR/PLS OXIMETRY MLT: CPT

## 2022-12-22 PROCEDURE — 36415 COLL VENOUS BLD VENIPUNCTURE: CPT | Performed by: STUDENT IN AN ORGANIZED HEALTH CARE EDUCATION/TRAINING PROGRAM

## 2022-12-22 PROCEDURE — 25000003 PHARM REV CODE 250: Performed by: OBSTETRICS & GYNECOLOGY

## 2022-12-22 PROCEDURE — 86850 RBC ANTIBODY SCREEN: CPT | Performed by: STUDENT IN AN ORGANIZED HEALTH CARE EDUCATION/TRAINING PROGRAM

## 2022-12-22 PROCEDURE — 85027 COMPLETE CBC AUTOMATED: CPT

## 2022-12-22 PROCEDURE — 63600175 PHARM REV CODE 636 W HCPCS

## 2022-12-22 PROCEDURE — 36415 COLL VENOUS BLD VENIPUNCTURE: CPT

## 2022-12-22 PROCEDURE — 80048 BASIC METABOLIC PNL TOTAL CA: CPT

## 2022-12-22 PROCEDURE — 20600001 HC STEP DOWN PRIVATE ROOM

## 2022-12-22 PROCEDURE — 99900031 HC PATIENT EDUCATION (STAT)

## 2022-12-22 PROCEDURE — 36430 TRANSFUSION BLD/BLD COMPNT: CPT

## 2022-12-22 PROCEDURE — 25000003 PHARM REV CODE 250: Performed by: STUDENT IN AN ORGANIZED HEALTH CARE EDUCATION/TRAINING PROGRAM

## 2022-12-22 RX ORDER — POLYETHYLENE GLYCOL 3350 17 G/17G
17 POWDER, FOR SOLUTION ORAL DAILY
Status: DISCONTINUED | OUTPATIENT
Start: 2022-12-22 | End: 2022-12-23 | Stop reason: HOSPADM

## 2022-12-22 RX ORDER — HYDROCODONE BITARTRATE AND ACETAMINOPHEN 500; 5 MG/1; MG/1
TABLET ORAL
Status: DISCONTINUED | OUTPATIENT
Start: 2022-12-22 | End: 2022-12-23 | Stop reason: HOSPADM

## 2022-12-22 RX ORDER — LANOLIN ALCOHOL/MO/W.PET/CERES
1 CREAM (GRAM) TOPICAL DAILY
Status: DISCONTINUED | OUTPATIENT
Start: 2022-12-22 | End: 2022-12-23 | Stop reason: HOSPADM

## 2022-12-22 RX ORDER — MUPIROCIN 20 MG/G
OINTMENT TOPICAL 2 TIMES DAILY
Status: DISCONTINUED | OUTPATIENT
Start: 2022-12-22 | End: 2022-12-23 | Stop reason: HOSPADM

## 2022-12-22 RX ORDER — HYDRALAZINE HYDROCHLORIDE 20 MG/ML
10 INJECTION INTRAMUSCULAR; INTRAVENOUS EVERY 6 HOURS PRN
Status: DISCONTINUED | OUTPATIENT
Start: 2022-12-22 | End: 2022-12-23 | Stop reason: HOSPADM

## 2022-12-22 RX ORDER — CHLORHEXIDINE GLUCONATE ORAL RINSE 1.2 MG/ML
15 SOLUTION DENTAL 2 TIMES DAILY
Status: DISCONTINUED | OUTPATIENT
Start: 2022-12-22 | End: 2022-12-23 | Stop reason: HOSPADM

## 2022-12-22 RX ORDER — AMOXICILLIN 250 MG
1 CAPSULE ORAL DAILY
Status: DISCONTINUED | OUTPATIENT
Start: 2022-12-22 | End: 2022-12-23 | Stop reason: HOSPADM

## 2022-12-22 RX ADMIN — CHLORHEXIDINE GLUCONATE 0.12% ORAL RINSE 15 ML: 1.2 LIQUID ORAL at 01:12

## 2022-12-22 RX ADMIN — HYDROCHLOROTHIAZIDE 25 MG: 25 TABLET ORAL at 08:12

## 2022-12-22 RX ADMIN — SENNOSIDES AND DOCUSATE SODIUM 1 TABLET: 50; 8.6 TABLET ORAL at 08:12

## 2022-12-22 RX ADMIN — Medication 3 MG: at 12:12

## 2022-12-22 RX ADMIN — Medication 3 MG: at 08:12

## 2022-12-22 RX ADMIN — FERROUS SULFATE TAB 325 MG (65 MG ELEMENTAL FE) 1 EACH: 325 (65 FE) TAB at 08:12

## 2022-12-22 RX ADMIN — MUPIROCIN: 20 OINTMENT TOPICAL at 08:12

## 2022-12-22 RX ADMIN — TRAMADOL HYDROCHLORIDE 50 MG: 50 TABLET, COATED ORAL at 11:12

## 2022-12-22 RX ADMIN — POLYETHYLENE GLYCOL 3350 17 G: 17 POWDER, FOR SOLUTION ORAL at 08:12

## 2022-12-22 RX ADMIN — PANTOPRAZOLE SODIUM 40 MG: 40 TABLET, DELAYED RELEASE ORAL at 08:12

## 2022-12-22 RX ADMIN — CHLORHEXIDINE GLUCONATE 0.12% ORAL RINSE 15 ML: 1.2 LIQUID ORAL at 08:12

## 2022-12-22 RX ADMIN — SODIUM CHLORIDE, POTASSIUM CHLORIDE, SODIUM LACTATE AND CALCIUM CHLORIDE: 600; 310; 30; 20 INJECTION, SOLUTION INTRAVENOUS at 12:12

## 2022-12-22 RX ADMIN — POTASSIUM CHLORIDE 20 MEQ: 1500 TABLET, EXTENDED RELEASE ORAL at 08:12

## 2022-12-22 RX ADMIN — PANTOPRAZOLE SODIUM 40 MG: 40 TABLET, DELAYED RELEASE ORAL at 12:12

## 2022-12-22 RX ADMIN — LOSARTAN POTASSIUM 50 MG: 50 TABLET, FILM COATED ORAL at 08:12

## 2022-12-22 NOTE — H&P
[facility] - Intensive Care ([place] [id number])  Gynecologic Oncology  H&P    Patient Name: Adriana Murphy  MRN: [id number]  Admission Date: 12/21/2022  Primary Care Provider: Primary Doctor No   Principal Problem: Vaginal bleeding    Subjective:     Chief Complaint/Reason for Admission: Vaginal bleeding    History of Present Illness:  Adriana Murphy is a 81 yo with past medical history of DVT, PAD (s/p stents in bilateral LE and chronic Eliquis and Plavix use), HTN and now endometrial cancer who presented to [place] ED with vaginal bleeding one month post op from RA-TLH/BSO. She was immediately taken to the OR for EUA and vaginal cuff was reinforced. Bleeding seemed controlled, but then patient started to have bleeding again, thus a vaginal packing and suazo placed. Patient received 2 u PRBC, 2 u FFP and 1 pack of platelets. IR not available at [facility], therefore transferred to [facility] for further evaluation.     Patient denies significant vaginal bleeding since packing removed yesterday. She endorses being lightheaded/dizzy upon standing and ambulating as well as fatigue. Denies anything in the vagina including intercourse since surgery. Endorses significant constipation, last BM Sunday. Denies nausea/vomiting. Denies abdominal pain, fever, or chills.     Last took plavix 12/18.    Onc History  -11/15/2022: RA-TLH/BSO. Pathology: Endometriod endometrial cancer. Grade 1, Stage 1A.  -12/20/22: Presented with vaginal bleeding, EUA with vaginal cuff reinforced.       Hospital Course:  12/22/2022: Transferred to [facility]. NAEO. Small amount of old blood present on pad from overnight. No active bleeding noted. Patient endorses being lightheaded/dizzy upon standing and ambulating as well as fatigue. H/h 7/3/23.5. Will transfuse additional unit pRBCs this AM for a total of: 3 u PRBC, 2 u FFP and 1 pack of platelets. Remove suazo this AM. Regular diet. Continue to monitor bleeding.            Past  Medical History:   Diagnosis Date    Anticoagulant long-term use     Arthritis     Asthma     DVT (deep vein thrombosis) in pregnancy     GERD (gastroesophageal reflux disease)     Hypertension     PAD (peripheral artery disease)     Post-menopausal bleeding     Pulmonary embolus     Thyroid disease     HYPO     Past Surgical History:   Procedure Laterality Date    APPENDECTOMY      btl      CATARACT EXTRACTION Bilateral     CHOLECYSTECTOMY      DILATION AND CURETTAGE OF UTERUS      EYE SURGERY      ivc filter      ROBOT-ASSISTED LAPAROSCOPIC ABDOMINAL HYSTERECTOMY USING DA JESSICA XI N/A 11/15/2022    Procedure: XI ROBOTIC HYSTERECTOMY;  Surgeon: Hugo Bradford MD;  Location: [facility] OR;  Service: OB/GYN;  Laterality: N/A;    ROBOT-ASSISTED LAPAROSCOPIC SALPINGO-OOPHORECTOMY USING DA JESSICA XI Bilateral 11/15/2022    Procedure: XI ROBOTIC SALPINGO-OOPHORECTOMY;  Surgeon: Hugo Bradford MD;  Location: [facility] OR;  Service: OB/GYN;  Laterality: Bilateral;    SHOULDER SURGERY Right 2017     Family History    None       Tobacco Use    Smoking status: Never    Smokeless tobacco: Never   Substance and Sexual Activity    Alcohol use: Not Currently    Drug use: Never    Sexual activity: Not Currently     Partners: Male       PTA Medications   Medication Sig    acetaminophen (TYLENOL) 650 MG TbSR Take 1 tablet (650 mg total) by mouth every 8 (eight) hours as needed (pain).    acetaminophen 325 mg Cap Take 325 mg by mouth.    b complex vitamins tablet Take 1 tablet by mouth once daily.    cetirizine (ZYRTEC) 10 MG tablet Take 10 mg by mouth once daily.    cholecalciferol, vitamin D3, 1,250 mcg (50,000 unit) capsule Take 50,000 Units by mouth every 7 days.    clopidogreL (PLAVIX) 75 mg tablet Take 75 mg by mouth once daily.    EUTHYROX 75 mcg tablet Take 75 mcg by mouth once daily.    folic acid (FOLVITE) 1 MG tablet Take 1,000 mcg by mouth once daily.    furosemide (LASIX) 20 MG tablet Take 20 mg by mouth 2 (two) times daily.     hydroCHLOROthiazide (HYDRODIURIL) 25 MG tablet Take 25 mg by mouth once daily.    HYDROcodone-acetaminophen (NORCO) 5-325 mg per tablet Take 1 tablet by mouth every 6 (six) hours as needed for Pain.    losartan (COZAAR) 50 MG tablet Take 50 mg by mouth once daily.    magnesium oxide-Mg AA chelate (MG-PLUS-PROTEIN) 133 mg Tab Take by mouth.    melatonin 1 mg Chew Take 1 tablet by mouth. 5MG    multivitamin capsule Take 1 capsule by mouth once daily.    omega-3 fatty acids/fish oil (FISH OIL-OMEGA-3 FATTY ACIDS) 300-1,000 mg capsule Take 1 capsule by mouth once daily.    ondansetron (ZOFRAN-ODT) 4 MG TbDL Take 4 mg by mouth every 8 (eight) hours as needed.    pantoprazole (PROTONIX) 40 MG tablet Take 40 mg by mouth 2 (two) times daily.    potassium chloride (K-TAB) 20 mEq Take 20 mEq by mouth once daily.    traMADoL (ULTRAM) 50 mg tablet Take 50 mg by mouth every 6 (six) hours as needed for Pain.       Review of patient's allergies indicates:   Allergen Reactions    Cefazolin Rash     Other reaction(s): BLISTERS    Iodine Nausea And Vomiting and Rash      / CAN NOT EAT SHELLFISH CAN NOT USE BETADINE    Ibuprofen Rash     Almost all NSAIDS - CAN TAKE TYLENOL AND TORADOL    Aspirin      Other reaction(s): Rectal bleeding, Stomach pain    Chocolate flavor     Morphine Hallucinations     States she can take Tramadol and Codeine for pain    Propoxyphene Hallucinations    Sulfa (sulfonamide antibiotics)        Review of Systems   Constitutional:  Negative for chills and fever.   Eyes:  Negative for visual disturbance.   Respiratory:  Negative for cough.    Cardiovascular:  Negative for chest pain, palpitations and leg swelling.   Gastrointestinal:  Negative for abdominal pain, constipation, diarrhea, nausea and vomiting.   Genitourinary:  Positive for vaginal bleeding.   Musculoskeletal:  Negative for arthralgias.   Neurological:  Negative for headaches.    Objective:     Vital Signs (Most Recent):    Vital Signs (24h  Range):  Temp:  [93.9 °F (34.4 °C)-98.4 °F (36.9 °C)] 97.9 °F (36.6 °C)  Pulse:  [59-98] 92  Resp:  [12-22] 20  SpO2:  [93 %-100 %] 99 %  BP: ()/(56-74) 115/67        There is no height or weight on file to calculate BMI.    Physical Exam:   Constitutional: She is oriented to person, place, and time. She appears well-developed and well-nourished. No distress.    HENT:   Head: Normocephalic and atraumatic.    Eyes: Pupils are equal, round, and reactive to light. EOM are normal.     Cardiovascular:  Normal rate.             Pulmonary/Chest: Effort normal. No respiratory distress.        Abdominal: Soft. She exhibits abdominal incision (5 healing port site incisions). She exhibits no distension. There is no abdominal tenderness. There is no rebound and no guarding.     Genitourinary:    Genitourinary Comments: Pad 10% saturated with old blood. No active bleeding noted. Love in place and draining clear urine.             Musculoskeletal: Normal range of motion and moves all extremeties. No tenderness or edema.       Neurological: She is alert and oriented to person, place, and time.    Skin: Skin is warm and dry. She is not diaphoretic.      Laboratory:  BMP:   Recent Labs   Lab 12/20/22  0825      K 3.8   CO2 26   BUN 16.4   CREATININE 1.00   CALCIUM 9.0   , CBC:   Recent Labs   Lab 12/20/22  0825 12/20/22  0846 12/20/22  1336   WBC 3.7*  --  5.9   HGB 8.6*  --  7.9*   HCT 27.9* 30* 25.9*     --  218   , CMP:   Recent Labs   Lab 12/20/22  0825      K 3.8   CO2 26   BUN 16.4   CREATININE 1.00   CALCIUM 9.0   ALBUMIN 2.9*   BILITOT 0.3   ALKPHOS 91   AST 12   ALT 12     Urine Studies:   Recent Labs   Lab 12/20/22  1640   APPEARANCEUA Clear   PROTEINUA Negative   BILIRUBINUA Negative   UROBILINOGEN 0.2   LEUKOCYTESUR Negative   RBCUA <5   WBCUA <5   BACTERIA None Seen         Assessment/Plan:     * Vaginal bleeding  -12/20-21: [facility] EUA with vaginal cuff reinforcement, continued  bleeding post surgery in PACU. Vaginal packing post procedure placed with suazo. Transfer to [facility] for possible need for IR intervention.    -In [place], received 2 u pRBC, 2 u FFP, 2 packs of platelets   -7.9/25.9/366 > products > 8.6/27.9/244  -12/22: Admit to [facility]. Vaginal bleeding stable. Patient denies further episodes of bleeding since vaginal packing removal yesterday. Endorses significant symptoms of anemia upon ambulation H/h today 7.3/23.5. Will transfuse additional unit pRBC today (total of 3 u pRBC) and initiate oral iron. Will remove suazo as bleeding stable and allow for diet. Continue strict pad counts    PAD (peripheral artery disease)  - Takes daily plavix 75mg  - Plavix held in setting of acute vaginal bleeding. Last dose 12/18    Thyroid disease  - Continue home synthroid    Hypertension  - Continue home HCTZ, losartan  - Lasix BID PRN for pedal edema  - Hydral for BP>180    sp RA-TLH/BSO  -11/15/2022 pathology: endometriod endometrial cancer, grade 1, Stage 1A      Pauline Vides MD  Gynecologic Oncology  [facility] - Intensive Care ([place]

## 2022-12-22 NOTE — NURSING
Patient assisted to bedside commode, patient urinated. Small clot size of a nickel noted in bedside commode.  No signs of bleeding, pad changed due to being wet with urine.  Patient walked in [place] to window and back with assistance, tolerated well. Patient assisted back to bed.

## 2022-12-22 NOTE — NURSING
Patient arrived to unit alert and oriented x 4. Denies any pain or discomfort. Denies any respiratory distress. Patient slept well. Safety measures maintain. Bed in lowest position and call light within reach.

## 2022-12-22 NOTE — NURSING
Patient alert and oriented x4. Up to chair with assistance. Up to BSC with assistance.  No signs of active bleeding. Patient denies pain. PO intake good. Safety measures in place. Bed in lowest position, call bell within reach.

## 2022-12-22 NOTE — HOSPITAL COURSE
12/22/2022: Transferred to [facility]. NAEO. Small amount of old blood present on pad from overnight. No active bleeding noted. Patient endorses being lightheaded/dizzy upon standing and ambulating as well as fatigue. H/h 7/3/23.5. Will transfuse additional unit pRBCs this AM for a total of: 3 u PRBC, 2 u FFP and 1 pack of platelets. Remove suazo this AM. Regular diet. Continue to monitor bleeding.  12/23/2022 - No bleeding overnight. Tolerating PO. H/h stable. Restart plavix today. Stable throughout day without bleeding. Stable for discharge. Return precautions discussed with patient.

## 2022-12-23 VITALS
WEIGHT: 267.44 LBS | RESPIRATION RATE: 20 BRPM | DIASTOLIC BLOOD PRESSURE: 66 MMHG | BODY MASS INDEX: 44.56 KG/M2 | TEMPERATURE: 98 F | HEART RATE: 68 BPM | SYSTOLIC BLOOD PRESSURE: 140 MMHG | HEIGHT: 65 IN | OXYGEN SATURATION: 98 %

## 2022-12-23 PROBLEM — N93.9 VAGINAL BLEEDING: Status: RESOLVED | Noted: 2022-12-20 | Resolved: 2022-12-23

## 2022-12-23 LAB
ABO + RH BLD: NORMAL
BASOPHILS # BLD AUTO: 0.01 K/UL (ref 0–0.2)
BASOPHILS NFR BLD: 0.2 % (ref 0–1.9)
BLD PROD TYP BPU: NORMAL
BLOOD UNIT EXPIRATION DATE: NORMAL
BLOOD UNIT TYPE CODE: 5100
CROSSMATCH INTERPRETATION: NORMAL
DIFFERENTIAL METHOD: ABNORMAL
DISPENSE STATUS: NORMAL
EOSINOPHIL # BLD AUTO: 0.2 K/UL (ref 0–0.5)
EOSINOPHIL NFR BLD: 4 % (ref 0–8)
ERYTHROCYTE [DISTWIDTH] IN BLOOD BY AUTOMATED COUNT: 15.9 % (ref 11.5–14.5)
HCT VFR BLD AUTO: 26.5 % (ref 37–48.5)
HGB BLD-MCNC: 8.2 G/DL (ref 12–16)
IMM GRANULOCYTES # BLD AUTO: 0.01 K/UL (ref 0–0.04)
IMM GRANULOCYTES NFR BLD AUTO: 0.2 % (ref 0–0.5)
LYMPHOCYTES # BLD AUTO: 2 K/UL (ref 1–4.8)
LYMPHOCYTES NFR BLD: 36.1 % (ref 18–48)
MCH RBC QN AUTO: 27.7 PG (ref 27–31)
MCHC RBC AUTO-ENTMCNC: 30.9 G/DL (ref 32–36)
MCV RBC AUTO: 90 FL (ref 82–98)
MONOCYTES # BLD AUTO: 0.7 K/UL (ref 0.3–1)
MONOCYTES NFR BLD: 12.8 % (ref 4–15)
NEUTROPHILS # BLD AUTO: 2.5 K/UL (ref 1.8–7.7)
NEUTROPHILS NFR BLD: 46.7 % (ref 38–73)
NRBC BLD-RTO: 0 /100 WBC
PLATELET # BLD AUTO: 160 K/UL (ref 150–450)
PMV BLD AUTO: 9.1 FL (ref 9.2–12.9)
RBC # BLD AUTO: 2.96 M/UL (ref 4–5.4)
UNIT NUMBER: NORMAL
WBC # BLD AUTO: 5.45 K/UL (ref 3.9–12.7)

## 2022-12-23 PROCEDURE — 25000003 PHARM REV CODE 250: Performed by: STUDENT IN AN ORGANIZED HEALTH CARE EDUCATION/TRAINING PROGRAM

## 2022-12-23 PROCEDURE — 85025 COMPLETE CBC W/AUTO DIFF WBC: CPT | Performed by: STUDENT IN AN ORGANIZED HEALTH CARE EDUCATION/TRAINING PROGRAM

## 2022-12-23 PROCEDURE — 25000003 PHARM REV CODE 250: Performed by: OBSTETRICS & GYNECOLOGY

## 2022-12-23 PROCEDURE — 99232 SBSQ HOSP IP/OBS MODERATE 35: CPT | Mod: ,,, | Performed by: OBSTETRICS & GYNECOLOGY

## 2022-12-23 PROCEDURE — 99232 PR SUBSEQUENT HOSPITAL CARE,LEVL II: ICD-10-PCS | Mod: ,,, | Performed by: OBSTETRICS & GYNECOLOGY

## 2022-12-23 PROCEDURE — 36415 COLL VENOUS BLD VENIPUNCTURE: CPT | Performed by: STUDENT IN AN ORGANIZED HEALTH CARE EDUCATION/TRAINING PROGRAM

## 2022-12-23 RX ORDER — CLOPIDOGREL BISULFATE 75 MG/1
75 TABLET ORAL DAILY
Status: DISCONTINUED | OUTPATIENT
Start: 2022-12-23 | End: 2022-12-23 | Stop reason: HOSPADM

## 2022-12-23 RX ORDER — ADHESIVE BANDAGE
30 BANDAGE TOPICAL DAILY PRN
Status: DISCONTINUED | OUTPATIENT
Start: 2022-12-23 | End: 2022-12-23 | Stop reason: HOSPADM

## 2022-12-23 RX ADMIN — POTASSIUM CHLORIDE 20 MEQ: 1500 TABLET, EXTENDED RELEASE ORAL at 08:12

## 2022-12-23 RX ADMIN — PANTOPRAZOLE SODIUM 40 MG: 40 TABLET, DELAYED RELEASE ORAL at 08:12

## 2022-12-23 RX ADMIN — HYDROCHLOROTHIAZIDE 25 MG: 25 TABLET ORAL at 08:12

## 2022-12-23 RX ADMIN — LEVOTHYROXINE SODIUM 75 MCG: 75 TABLET ORAL at 05:12

## 2022-12-23 RX ADMIN — POLYETHYLENE GLYCOL 3350 17 G: 17 POWDER, FOR SOLUTION ORAL at 08:12

## 2022-12-23 RX ADMIN — CLOPIDOGREL BISULFATE 75 MG: 75 TABLET ORAL at 09:12

## 2022-12-23 RX ADMIN — MUPIROCIN: 20 OINTMENT TOPICAL at 08:12

## 2022-12-23 RX ADMIN — CHLORHEXIDINE GLUCONATE 0.12% ORAL RINSE 15 ML: 1.2 LIQUID ORAL at 08:12

## 2022-12-23 RX ADMIN — MAGNESIUM HYDROXIDE 2400 MG: 400 SUSPENSION ORAL at 07:12

## 2022-12-23 RX ADMIN — FERROUS SULFATE TAB 325 MG (65 MG ELEMENTAL FE) 1 EACH: 325 (65 FE) TAB at 08:12

## 2022-12-23 RX ADMIN — LOSARTAN POTASSIUM 50 MG: 50 TABLET, FILM COATED ORAL at 08:12

## 2022-12-23 RX ADMIN — SENNOSIDES AND DOCUSATE SODIUM 1 TABLET: 50; 8.6 TABLET ORAL at 08:12

## 2022-12-23 NOTE — ASSESSMENT & PLAN NOTE
-12/20-21: [facility] EUA with vaginal cuff reinforcement, continued bleeding post surgery in PACU. Vaginal packing post procedure placed with suazo. Transfer to [facility] for possible need for IR intervention.    -In [place], received 2 u pRBC, 2 u FFP, 2 packs of platelets   -7.9/25.9/366 > products > 8.6/27.9/244  -12/22: Admit to [facility]. Vaginal bleeding stable. Given additional 1u pRBC.  - 12/23: Labs stable. Restart Plavix.

## 2022-12-23 NOTE — PROGRESS NOTES
[facility] - Intensive Care ([place] [address])  Gynecologic Oncology  Progress Note      Patient Name: Adriana Murphy  MRN: [id number]  Admission Date: 12/21/2022  Hospital Length of Stay: 2 days  Attending Provider: Hugo Bradford MD  Primary Care Provider: Primary Doctor No  Principal Problem: Vaginal bleeding    Follow-up For: * No surgery found *  Post-Operative Day:    Subjective:      History of Present Illness:  Adriana Murphy is a 83 yo with past medical history of DVT, PAD (s/p stents in bilateral LE and chronic Eliquis and Plavix use), HTN and now endometrial cancer who presented to [place] ED with vaginal bleeding one month post op from -TL/BSO. She was immediately taken to the OR for EUA and vaginal cuff was reinforced. Bleeding seemed controlled, but then patient started to have bleeding again, thus a vaginal packing and suazo placed. Patient received 2 u PRBC, 2 u FFP and 1 pack of platelets. IR not available at [place], therefore transferred to [facility] for further evaluation.     Patient denies significant vaginal bleeding since packing removed yesterday. She endorses being lightheaded/dizzy upon standing and ambulating as well as fatigue. Denies anything in the vagina including intercourse since surgery. Endorses significant constipation, last BM Sunday. Denies nausea/vomiting. Denies abdominal pain, fever, or chills.     Onc History  -11/15/2022: RA-TL/BSO. Pathology: Endometriod endometrial cancer. Grade 1, Stage 1A.  -12/20/22: Presented with vaginal bleeding, EUA with vaginal cuff reinforced.       Hospital Course:  12/22/2022: Transferred to [facility]. NAEO. Small amount of old blood present on pad from overnight. No active bleeding noted. Patient endorses being lightheaded/dizzy upon standing and ambulating as well as fatigue. H/h 7/3/23.5. Will transfuse additional unit pRBCs this AM for a total of: 3 u PRBC, 2 u FFP and 1 pack of platelets. Remove suazo this AM.  Regular diet. Continue to monitor bleeding.  12/23/2022 - No bleeding overnight. Tolerating PO. H/h stable. Restart plavix today.       Interim:  No acute events. No pain. No vaginal bleeding. Tolerating PO. Ambulating at baseline.      Past Medical History:   Diagnosis Date    Anticoagulant long-term use     Arthritis     Asthma     DVT (deep vein thrombosis) in pregnancy     GERD (gastroesophageal reflux disease)     Hypertension     PAD (peripheral artery disease)     Post-menopausal bleeding     Pulmonary embolus     Thyroid disease     HYPO     Past Surgical History:   Procedure Laterality Date    APPENDECTOMY      btl      CATARACT EXTRACTION Bilateral     CHOLECYSTECTOMY      DILATION AND CURETTAGE OF UTERUS      EXAMINATION UNDER ANESTHESIA N/A 12/20/2022    Procedure: EXAM UNDER ANESTHESIA;  Surgeon: Vargas Frias MD;  Location: [facility];  Service: OB/GYN;  Laterality: N/A;    EYE SURGERY      ivc filter      RE-EXPLORATION FOR BLEEDING N/A 12/20/2022    Procedure: RE-EXPLORATION, FOR BLEEDING;  Surgeon: Vargas Frias MD;  Location: [facility];  Service: OB/GYN;  Laterality: N/A;    REPAIR OF VAGINAL CUFF  12/20/2022    Procedure: REPAIR, VAGINAL CUFF;  Surgeon: Vargas Frias MD;  Location: [facility] OR;  Service: OB/GYN;;    ROBOT-ASSISTED LAPAROSCOPIC ABDOMINAL HYSTERECTOMY USING DA JESSICA XI N/A 11/15/2022    Procedure: XI ROBOTIC HYSTERECTOMY;  Surgeon: Hugo Bradford MD;  Location: [facility];  Service: OB/GYN;  Laterality: N/A;    ROBOT-ASSISTED LAPAROSCOPIC SALPINGO-OOPHORECTOMY USING DA JESSICA XI Bilateral 11/15/2022    Procedure: XI ROBOTIC SALPINGO-OOPHORECTOMY;  Surgeon: Hugo Bradford MD;  Location: [facility];  Service: OB/GYN;  Laterality: Bilateral;    SHOULDER SURGERY Right 2017     Family History    None       Tobacco Use    Smoking status: Never    Smokeless tobacco: Never   Substance and Sexual Activity    Alcohol use: Not Currently    Drug use: Never     Sexual activity: Not Currently     Partners: Male       PTA Medications   Medication Sig    acetaminophen (TYLENOL) 650 MG TbSR Take 1 tablet (650 mg total) by mouth every 8 (eight) hours as needed (pain).    acetaminophen 325 mg Cap Take 325 mg by mouth.    b complex vitamins tablet Take 1 tablet by mouth once daily.    cetirizine (ZYRTEC) 10 MG tablet Take 10 mg by mouth once daily.    cholecalciferol, vitamin D3, 1,250 mcg (50,000 unit) capsule Take 50,000 Units by mouth every 7 days.    clopidogreL (PLAVIX) 75 mg tablet Take 75 mg by mouth once daily.    EUTHYROX 75 mcg tablet Take 75 mcg by mouth once daily.    folic acid (FOLVITE) 1 MG tablet Take 1,000 mcg by mouth once daily.    furosemide (LASIX) 20 MG tablet Take 20 mg by mouth 2 (two) times daily.    hydroCHLOROthiazide (HYDRODIURIL) 25 MG tablet Take 25 mg by mouth once daily.    HYDROcodone-acetaminophen (NORCO) 5-325 mg per tablet Take 1 tablet by mouth every 6 (six) hours as needed for Pain.    losartan (COZAAR) 50 MG tablet Take 50 mg by mouth once daily.    magnesium oxide-Mg AA chelate (MG-PLUS-PROTEIN) 133 mg Tab Take by mouth.    melatonin 1 mg Chew Take 1 tablet by mouth. 5MG    multivitamin capsule Take 1 capsule by mouth once daily.    omega-3 fatty acids/fish oil (FISH OIL-OMEGA-3 FATTY ACIDS) 300-1,000 mg capsule Take 1 capsule by mouth once daily.    ondansetron (ZOFRAN-ODT) 4 MG TbDL Take 4 mg by mouth every 8 (eight) hours as needed.    pantoprazole (PROTONIX) 40 MG tablet Take 40 mg by mouth 2 (two) times daily.    potassium chloride (K-TAB) 20 mEq Take 20 mEq by mouth once daily.    traMADoL (ULTRAM) 50 mg tablet Take 50 mg by mouth every 6 (six) hours as needed for Pain.       Review of patient's allergies indicates:   Allergen Reactions    Cefazolin Rash     Other reaction(s): BLISTERS    Iodine Nausea And Vomiting and Rash      / CAN NOT EAT SHELLFISH CAN NOT USE BETADINE    Ibuprofen Rash     Almost all NSAIDS  - CAN TAKE TYLENOL AND TORADOL    Aspirin      Other reaction(s): Rectal bleeding, Stomach pain    Chocolate flavor     Morphine Hallucinations     States she can take Tramadol and Codeine for pain    Propoxyphene Hallucinations    Sulfa (sulfonamide antibiotics)        Review of Systems   Constitutional:  Negative for chills and fever.   Eyes:  Negative for visual disturbance.   Respiratory:  Negative for cough.    Cardiovascular:  Negative for chest pain, palpitations and leg swelling.   Gastrointestinal:  Negative for abdominal pain, constipation, diarrhea, nausea and vomiting.   Genitourinary:  Positive for vaginal bleeding.   Musculoskeletal:  Negative for arthralgias.   Neurological:  Negative for headaches.    Objective:     Vital Signs (Most Recent):  Temp: 98.2 °F (36.8 °C) (12/23/22 0503)  Pulse: 78 (12/23/22 0503)  Resp: 19 (12/23/22 0503)  BP: (!) 125/58 (12/23/22 0503)  SpO2: 100 % (12/23/22 0503) Vital Signs (24h Range):  Temp:  [96.7 °F (35.9 °C)-98.8 °F (37.1 °C)] 98.2 °F (36.8 °C)  Pulse:  [75-97] 78  Resp:  [17-19] 19  SpO2:  [92 %-100 %] 100 %  BP: (118-160)/(58-87) 125/58     Weight: 121.3 kg (267 lb 6.7 oz)  Body mass index is 44.5 kg/m².    Physical Exam:   Constitutional: She is oriented to person, place, and time. She appears well-developed and well-nourished. No distress.    HENT:   Head: Normocephalic and atraumatic.    Eyes: Pupils are equal, round, and reactive to light. EOM are normal.     Cardiovascular:  Normal rate.             Pulmonary/Chest: Effort normal. No respiratory distress.        Abdominal: Soft. She exhibits abdominal incision (5 healing port site incisions). She exhibits no distension. There is no abdominal tenderness. There is no rebound and no guarding.     Genitourinary:    Genitourinary Comments: Pad 10% saturated with old blood. No active bleeding noted. Love in place and draining clear urine.             Musculoskeletal: Normal range of motion and moves all  extremeties. No tenderness or edema.       Neurological: She is alert and oriented to person, place, and time.    Skin: Skin is warm and dry. She is not diaphoretic.      Laboratory:  BMP:   Recent Labs   Lab 12/22/22  0450         K 3.7      CO2 28   BUN 10   CREATININE 0.8   CALCIUM 8.1*     , CBC:   Recent Labs   Lab 12/22/22  0450 12/23/22  0315   WBC 6.31 5.45   HGB 7.3* 8.2*   HCT 23.5* 26.5*    160     , CMP:   Recent Labs   Lab 12/22/22  0450      K 3.7      CO2 28      BUN 10   CREATININE 0.8   CALCIUM 8.1*   ANIONGAP 4*       Urine Studies:   No results for input(s): COLORU, APPEARANCEUA, PHUR, SPECGRAV, PROTEINUA, GLUCUA, KETONESU, BILIRUBINUA, OCCULTUA, NITRITE, UROBILINOGEN, LEUKOCYTESUR, RBCUA, WBCUA, BACTERIA, SQUAMEPITHEL, HYALINECASTS in the last 48 hours.    Invalid input(s): GERMÁN        Assessment/Plan:     * Vaginal bleeding  -12/20-21: [place] general EUA with vaginal cuff reinforcement, continued bleeding post surgery in PACU. Vaginal packing post procedure placed with suazo. Transfer to [facility] for possible need for IR intervention.    -In [place], received 2 u pRBC, 2 u FFP, 2 packs of platelets   -7.9/25.9/366 > products > 8.6/27.9/244  -12/22: Admit to [facility]. Vaginal bleeding stable. Given additional 1u pRBC.  - 12/23: Labs stable. Restart Plavix.     PAD (peripheral artery disease)  - Takes daily plavix 75mg  - Will restart today.     Thyroid disease  - Continue home synthroid    Hypertension  - Continue home HCTZ, losartan  - Lasix BID PRN for pedal edema  - Hydral for BP>180    sp RA-TLH/BSO  -11/15/2022 pathology: endometriod endometrial cancer, grade 1, Stage 1A      VTE Risk Mitigation (From admission, onward)         Ordered     IP VTE HIGH RISK PATIENT  Once         12/21/22 2306     Place sequential compression device  Until discontinued         12/21/22 2306                Was suazo catheter removed? Yes    FRANKIE  Robert Arvizu MD  OBGYN PGY-4

## 2022-12-23 NOTE — NURSING
Daughter here to  patient. Belongings with patient. Discharge instructions reviewed. Left via wheelchair.

## 2022-12-23 NOTE — SUBJECTIVE & OBJECTIVE
Interim:  No acute events. No pain. No vaginal bleeding. Tolerating PO. Ambulating at baseline.      Past Medical History:   Diagnosis Date    Anticoagulant long-term use     Arthritis     Asthma     DVT (deep vein thrombosis) in pregnancy     GERD (gastroesophageal reflux disease)     Hypertension     PAD (peripheral artery disease)     Post-menopausal bleeding     Pulmonary embolus     Thyroid disease     HYPO     Past Surgical History:   Procedure Laterality Date    APPENDECTOMY      btl      CATARACT EXTRACTION Bilateral     CHOLECYSTECTOMY      DILATION AND CURETTAGE OF UTERUS      EXAMINATION UNDER ANESTHESIA N/A 12/20/2022    Procedure: EXAM UNDER ANESTHESIA;  Surgeon: Vargas Frias MD;  Location: [facility];  Service: OB/GYN;  Laterality: N/A;    EYE SURGERY      ivc filter      RE-EXPLORATION FOR BLEEDING N/A 12/20/2022    Procedure: RE-EXPLORATION, FOR BLEEDING;  Surgeon: Vargas Frias MD;  Location: [facility] OR;  Service: OB/GYN;  Laterality: N/A;    REPAIR OF VAGINAL CUFF  12/20/2022    Procedure: REPAIR, VAGINAL CUFF;  Surgeon: Vargas Frias MD;  Location: [facility] OR;  Service: OB/GYN;;    ROBOT-ASSISTED LAPAROSCOPIC ABDOMINAL HYSTERECTOMY USING DA JESSICA XI N/A 11/15/2022    Procedure: XI ROBOTIC HYSTERECTOMY;  Surgeon: Hugo Bradford MD;  Location: [facility];  Service: OB/GYN;  Laterality: N/A;    ROBOT-ASSISTED LAPAROSCOPIC SALPINGO-OOPHORECTOMY USING DA JESSICA XI Bilateral 11/15/2022    Procedure: XI ROBOTIC SALPINGO-OOPHORECTOMY;  Surgeon: Hugo Bradford MD;  Location: [facility];  Service: OB/GYN;  Laterality: Bilateral;    SHOULDER SURGERY Right 2017     Family History    None       Tobacco Use    Smoking status: Never    Smokeless tobacco: Never   Substance and Sexual Activity    Alcohol use: Not Currently    Drug use: Never    Sexual activity: Not Currently     Partners: Male       PTA Medications   Medication Sig    acetaminophen (TYLENOL) 650 MG TbSR Take 1 tablet (650 mg total) by mouth  every 8 (eight) hours as needed (pain).    acetaminophen 325 mg Cap Take 325 mg by mouth.    b complex vitamins tablet Take 1 tablet by mouth once daily.    cetirizine (ZYRTEC) 10 MG tablet Take 10 mg by mouth once daily.    cholecalciferol, vitamin D3, 1,250 mcg (50,000 unit) capsule Take 50,000 Units by mouth every 7 days.    clopidogreL (PLAVIX) 75 mg tablet Take 75 mg by mouth once daily.    EUTHYROX 75 mcg tablet Take 75 mcg by mouth once daily.    folic acid (FOLVITE) 1 MG tablet Take 1,000 mcg by mouth once daily.    furosemide (LASIX) 20 MG tablet Take 20 mg by mouth 2 (two) times daily.    hydroCHLOROthiazide (HYDRODIURIL) 25 MG tablet Take 25 mg by mouth once daily.    HYDROcodone-acetaminophen (NORCO) 5-325 mg per tablet Take 1 tablet by mouth every 6 (six) hours as needed for Pain.    losartan (COZAAR) 50 MG tablet Take 50 mg by mouth once daily.    magnesium oxide-Mg AA chelate (MG-PLUS-PROTEIN) 133 mg Tab Take by mouth.    melatonin 1 mg Chew Take 1 tablet by mouth. 5MG    multivitamin capsule Take 1 capsule by mouth once daily.    omega-3 fatty acids/fish oil (FISH OIL-OMEGA-3 FATTY ACIDS) 300-1,000 mg capsule Take 1 capsule by mouth once daily.    ondansetron (ZOFRAN-ODT) 4 MG TbDL Take 4 mg by mouth every 8 (eight) hours as needed.    pantoprazole (PROTONIX) 40 MG tablet Take 40 mg by mouth 2 (two) times daily.    potassium chloride (K-TAB) 20 mEq Take 20 mEq by mouth once daily.    traMADoL (ULTRAM) 50 mg tablet Take 50 mg by mouth every 6 (six) hours as needed for Pain.       Review of patient's allergies indicates:   Allergen Reactions    Cefazolin Rash     Other reaction(s): BLISTERS    Iodine Nausea And Vomiting and Rash      / CAN NOT EAT SHELLFISH CAN NOT USE BETADINE    Ibuprofen Rash     Almost all NSAIDS - CAN TAKE TYLENOL AND TORADOL    Aspirin      Other reaction(s): Rectal bleeding, Stomach pain    Chocolate flavor     Morphine Hallucinations     States she can take Tramadol and Codeine  for pain    Propoxyphene Hallucinations    Sulfa (sulfonamide antibiotics)        Review of Systems   Constitutional:  Negative for chills and fever.   Eyes:  Negative for visual disturbance.   Respiratory:  Negative for cough.    Cardiovascular:  Negative for chest pain, palpitations and leg swelling.   Gastrointestinal:  Negative for abdominal pain, constipation, diarrhea, nausea and vomiting.   Genitourinary:  Positive for vaginal bleeding.   Musculoskeletal:  Negative for arthralgias.   Neurological:  Negative for headaches.    Objective:     Vital Signs (Most Recent):  Temp: 98.2 °F (36.8 °C) (12/23/22 0503)  Pulse: 78 (12/23/22 0503)  Resp: 19 (12/23/22 0503)  BP: (!) 125/58 (12/23/22 0503)  SpO2: 100 % (12/23/22 0503) Vital Signs (24h Range):  Temp:  [96.7 °F (35.9 °C)-98.8 °F (37.1 °C)] 98.2 °F (36.8 °C)  Pulse:  [75-97] 78  Resp:  [17-19] 19  SpO2:  [92 %-100 %] 100 %  BP: (118-160)/(58-87) 125/58     Weight: 121.3 kg (267 lb 6.7 oz)  Body mass index is 44.5 kg/m².    Physical Exam:   Constitutional: She is oriented to person, place, and time. She appears well-developed and well-nourished. No distress.    HENT:   Head: Normocephalic and atraumatic.    Eyes: Pupils are equal, round, and reactive to light. EOM are normal.     Cardiovascular:  Normal rate.             Pulmonary/Chest: Effort normal. No respiratory distress.        Abdominal: Soft. She exhibits abdominal incision (5 healing port site incisions). She exhibits no distension. There is no abdominal tenderness. There is no rebound and no guarding.     Genitourinary:    Genitourinary Comments: Pad 10% saturated with old blood. No active bleeding noted. Love in place and draining clear urine.             Musculoskeletal: Normal range of motion and moves all extremeties. No tenderness or edema.       Neurological: She is alert and oriented to person, place, and time.    Skin: Skin is warm and dry. She is not diaphoretic.      Laboratory:  BMP:    Recent Labs   Lab 12/22/22  0450         K 3.7      CO2 28   BUN 10   CREATININE 0.8   CALCIUM 8.1*     , CBC:   Recent Labs   Lab 12/22/22  0450 12/23/22  0315   WBC 6.31 5.45   HGB 7.3* 8.2*   HCT 23.5* 26.5*    160     , CMP:   Recent Labs   Lab 12/22/22  0450      K 3.7      CO2 28      BUN 10   CREATININE 0.8   CALCIUM 8.1*   ANIONGAP 4*       Urine Studies:   No results for input(s): COLORU, APPEARANCEUA, PHUR, SPECGRAV, PROTEINUA, GLUCUA, KETONESU, BILIRUBINUA, OCCULTUA, NITRITE, UROBILINOGEN, LEUKOCYTESUR, RBCUA, WBCUA, BACTERIA, SQUAMEPITHEL, HYALINECASTS in the last 48 hours.    Invalid input(s): GERMÁN

## 2022-12-24 NOTE — PLAN OF CARE
[facility] - Intensive Care ([place] [address])  Initial Discharge Assessment       Primary Care Provider: Primary Doctor No    Admission Diagnosis: Vaginal bleeding [N93.9]    Admission Date: 12/21/2022  Expected Discharge Date: 12/23/2022    Discharge Barriers Identified: None    Payor: MEDICARE / Plan: MEDICARE PART A & B / Product Type: Government /     Extended Emergency Contact Information  Primary Emergency Contact: Pedro Hook  Mobile Phone: [phone]  Relation: Daughter  Preferred language: English   needed? No  Secondary Emergency Contact: Ines Santoyo  Mobile Phone: [phone]  Relation: Daughter  Preferred language: English   needed? No    Discharge Plan A: Home with family  Discharge Plan B: Home with family    No Pharmacies Listed    Initial Assessment (most recent)       Adult Discharge Assessment - 12/23/22 1316          Discharge Assessment    Assessment Type Discharge Planning Assessment     Confirmed/corrected address, phone number and insurance Yes     Confirmed Demographics Correct on Facesheet     Source of Information patient     Reason For Admission vaginal bleeding     People in Home spouse     Facility Arrived From: [facility]     Do you expect to return to your current living situation? Yes     Do you have help at home or someone to help you manage your care at home? Yes     Prior to hospitilization cognitive status: Alert/Oriented     Current cognitive status: Alert/Oriented     Home Accessibility stairs within home     Equipment Currently Used at Home none     Readmission within 30 days? No     Patient currently being followed by outpatient case management? No     Do you currently have service(s) that help you manage your care at home? No     Do you take prescription medications? Yes     Do you have prescription coverage? Yes     Coverage MEDICARE PART A & B     Do you have any problems affording any of your prescribed medications? No     Is the  patient taking medications as prescribed? yes     How do you get to doctors appointments? family or friend will provide     Are you on dialysis? No     Do you take coumadin? No     Discharge Plan A Home with family     Discharge Plan B Home with family     DME Needed Upon Discharge  none     Discharge Plan discussed with: Patient     Discharge Barriers Identified None

## 2022-12-24 NOTE — PLAN OF CARE
[facility] - Intensive [facility] ([facility])  Discharge Final Note    Primary Care Provider: Primary Doctor No    Expected Discharge Date: 12/23/2022    Final Discharge Note (most recent)       Final Note - 12/23/22 1832          Final Note    Assessment Type Final Discharge Note     Anticipated Discharge Disposition Home or Self Care     Hospital Resources/Appts/Education Provided Appointments scheduled and added to AVS        Post-Acute Status    Post-Acute Authorization Other     Other Status No Post-Acute Service Needs                     Important Message from Medicare             Contact Info       Hugo Bradford MD   Specialty: Gynecologic Oncology, Gynecology, Oncology    [address]  [facility] [address]   Phone: [phone]       Next Steps: Schedule an appointment as soon as possible for a visit    Instructions: Follow up from hospital admission          Future Appointments   Date Time Provider Department Center   1/3/2023 11:45 AM Hugo Bradford MD [facility]

## 2022-12-27 NOTE — DISCHARGE SUMMARY
[facility] - 2nd Floor Mother/Baby Unit  Obstetrics & Gynecology  Discharge Summary    Patient Name: Adriana Murphy  MRN: [id number]  Admission Date: 12/20/2022  Hospital Length of Stay: 1 days  Discharge Date and Time: 12/21/2022  7:17 PM  Attending Physician: No att. providers found   Discharging Provider: Mick Frias MD  Primary Care Provider: Primary Doctor Peace    HPI:  Presented with severe vaginal hemorrhage to ER.  Was s/p XINH 4 weeks ago.    Hospital Course:  Admitted in an expedited manor and taken to OR.  #units of PRBC's had to be given with FFP and platelets as well.  Underwent EUA and vaginal cuff suturing to stop bleeding.  Vagina packed and eventually transferred to [facility] as her primary surgeon was there and IR was readily available if bleeding were to get worse.  Per documentation, pt's bleeding slowed and only needed 1 more unit of blood and she was D/C'd from [facility]  Goals of Care Treatment Preferences:  Code Status: Full Code      Procedure(s) (LRB):  EXAM UNDER ANESTHESIA (N/A)  RE-EXPLORATION, FOR BLEEDING (N/A)  REPAIR, VAGINAL CUFF             Pending Diagnostic Studies:     None        Final Active Diagnoses:      Problems Resolved During this Admission:    Diagnosis Date Noted Date Resolved POA    PRINCIPAL PROBLEM:  Vaginal bleeding [N93.9] 12/20/2022 12/23/2022 Yes        Discharged Condition: stable    Disposition: Hospice/Medical Facility    Follow Up:Can FU as needed.  Has scheduled appt with Dr. Bradford.     Patient Instructions:   No discharge procedures on file.    Mick Frias MD  Obstetrics & Gynecology  [facility] - 2nd Floor Mother/Baby Unit

## 2023-01-03 ENCOUNTER — OFFICE VISIT (OUTPATIENT)
Dept: GYNECOLOGIC ONCOLOGY | Facility: CLINIC | Age: 83
End: 2023-01-03
Payer: MEDICARE

## 2023-01-03 VITALS — RESPIRATION RATE: 20 BRPM

## 2023-01-03 DIAGNOSIS — C54.1 ENDOMETRIAL CANCER: ICD-10-CM

## 2023-01-03 PROCEDURE — 99214 PR OFFICE/OUTPT VISIT, EST, LEVL IV, 30-39 MIN: ICD-10-PCS | Mod: 24,S$GLB,, | Performed by: OBSTETRICS & GYNECOLOGY

## 2023-01-03 PROCEDURE — 99214 OFFICE O/P EST MOD 30 MIN: CPT | Mod: 24,S$GLB,, | Performed by: OBSTETRICS & GYNECOLOGY

## 2023-01-03 NOTE — PROGRESS NOTES
Subjective:      Patient ID: Adriana Murphy is a 82 y.o. female.    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 Frias 12/17/22  Extensive PVD with bilateral iliac stents and anticoagulation      HPI  Here today for post-op check and hospital f/u.  No bleeding since d/c home on 12/23/2022.  Feels well.  Review of Systems   Constitutional:  Negative for activity change, appetite change, chills, fatigue and fever.   HENT:  Negative for hearing loss, mouth sores, nosebleeds, sore throat and tinnitus.    Eyes:  Negative for visual disturbance.   Respiratory:  Negative for cough, chest tightness, shortness of breath and wheezing.    Cardiovascular:  Negative for chest pain and leg swelling.   Gastrointestinal:  Negative for abdominal distention, abdominal pain, blood in stool, constipation, diarrhea, nausea and vomiting.   Genitourinary:  Negative for dysuria, flank pain, frequency, hematuria, pelvic pain, vaginal bleeding, vaginal discharge and vaginal pain.   Musculoskeletal:  Negative for arthralgias and back pain.   Skin:  Negative for rash.   Neurological:  Negative for dizziness, seizures, syncope, weakness and numbness.   Hematological:  Does not bruise/bleed easily.   Psychiatric/Behavioral:  Negative for confusion and sleep disturbance. The patient is not nervous/anxious.      Objective:   Physical Exam:   Constitutional: She is oriented to person, place, and time. She appears well-developed and well-nourished. No distress.    HENT:   Head: Normocephalic and atraumatic.    Eyes: No scleral icterus.     Cardiovascular:       Exam reveals no cyanosis and no edema.        Pulmonary/Chest: Effort normal. No respiratory distress. She exhibits no tenderness.        Abdominal: Soft. She exhibits no distension (incisions well-healed), no fluid  wave and no mass. There is no abdominal tenderness. There is no rebound and no guarding. No hernia.     Genitourinary:    Vagina and rectum normal.      Pelvic exam was performed with patient supine.   Labial bartholins normal.There is no rash, tenderness or lesion on the right labia. There is no rash, tenderness or lesion on the left labia. Right adnexum displays no mass, no tenderness and no fullness. Left adnexum displays no mass, no tenderness and no fullness. Vaginal cuff normal.  No  no vaginal discharge, bleeding (cuff intact and healing) or unspecified prolapse of vaginal walls in the vagina. Cervix is absent.Uterus is absent.           Musculoskeletal: Normal range of motion and moves all extremeties. No edema.      Lymphadenopathy:     She has no cervical adenopathy.    Neurological: She is alert and oriented to person, place, and time.    Skin: Skin is warm and dry. No cyanosis. No pallor.    Psychiatric: She has a normal mood and affect. Her behavior is normal.     Assessment:     1. Endometrial cancer        Plan:       Patient doing well overall.  No further bleeding. Path discussed with her and all questions answered.  Will not need further treatment.  Bleeding precautions given.  RTC 4 months       [de-identified] : Pt is here for cpe. Her partner passed away in December from a blood cancer. \par \par Had episode of colitis in February. Had a colonoscopy by Dr Nina and was treated with a medication.  \par \par Had mammogram last year.

## 2023-01-08 NOTE — DISCHARGE SUMMARY
[facility] - Intensive Care ([place] [address])  Gynecologic Oncology  Discharge Summary    Patient Name: Adriana Murphy  MRN: [id number]  Admission Date: 12/21/2022  Hospital Length of Stay: 2 days  Discharge Date and Time:  01/08/2023 2:56 AM  Attending Physician: No att. providers found   Discharging Provider: Kai Arvizu MD  Primary Care Provider: Primary Doctor Peace    HPI:   Adriana Murphy is a 83 yo with past medical history of DVT, PAD (s/p stents in bilateral LE and chronic Eliquis and Plavix use), HTN and now endometrial cancer who presented to [facility] ED with vaginal bleeding one month post op from RA-TL/BSO. She was immediately taken to the OR for EUA and vaginal cuff was reinforced. Bleeding seemed controlled, but then patient started to have bleeding again, thus a vaginal packing and suazo placed. Patient received 2 u PRBC, 2 u FFP and 1 pack of platelets. IR not available at [facility], therefore transferred to [facility] for further evaluation.     Patient denies significant vaginal bleeding since packing removed yesterday. She endorses being lightheaded/dizzy upon standing and ambulating as well as fatigue. Denies anything in the vagina including intercourse since surgery. Endorses significant constipation, last BM Sunday. Denies nausea/vomiting. Denies abdominal pain, fever, or chills.     Onc History  -11/15/2022: RA-TL/BSO. Pathology: Endometriod endometrial cancer. Grade 1, Stage 1A.  -12/20/22: Presented with vaginal bleeding, EUA with vaginal cuff reinforced.       Hospital Course:  12/22/2022: Transferred to [facility]. NAEO. Small amount of old blood present on pad from overnight. No active bleeding noted. Patient endorses being lightheaded/dizzy upon standing and ambulating as well as fatigue. H/h 7/3/23.5. Will transfuse additional unit pRBCs this AM for a total of: 3 u PRBC, 2 u FFP and 1 pack of platelets. Remove suazo this AM. Regular diet. Continue to monitor  bleeding.  12/23/2022 - No bleeding overnight. Tolerating PO. H/h stable. Restart plavix today. Stable throughout day without bleeding. Stable for discharge. Return precautions discussed with patient.       Goals of Care Treatment Preferences:  Code Status: Full Code      * No surgery found *         Significant Diagnostic Studies: Labs: BMP: No results for input(s): GLU, NA, K, CL, CO2, BUN, CREATININE, CALCIUM, MG in the last 48 hours. and CMP No results for input(s): NA, K, CL, CO2, GLU, BUN, CREATININE, CALCIUM, PROT, ALBUMIN, BILITOT, ALKPHOS, AST, ALT, ANIONGAP, ESTGFRAFRICA, EGFRNONAA in the last 48 hours.    Pending Diagnostic Studies:     None        Final Active Diagnoses:    Diagnosis Date Noted POA    Hypertension [I10]  Yes    Thyroid disease [E07.9]  Yes    PAD (peripheral artery disease) [I73.9]  Yes    sp RA-TLH/BSO [Z90.710] 11/15/2022 Not Applicable      Problems Resolved During this Admission:    Diagnosis Date Noted Date Resolved POA    PRINCIPAL PROBLEM:  Vaginal bleeding [N93.9] 12/20/2022 12/23/2022 Yes    Pulmonary embolus [I26.99]  12/22/2022 Unknown        Does this patient meet criteria for extended DVT prophylaxis? No, because taking eliquis    Discharged Condition: good    Disposition: Home or Self Care    Follow Up:   Follow-up Information     Hugo Bradford MD. Schedule an appointment as soon as possible for a visit.    Specialties: Gynecologic Oncology, Gynecology, Oncology  Why: Follow up from hospital admission  Contact information:  [address] [place] [id number] [phone]                       Patient Instructions:      Diet Adult Regular     Lifting restrictions   Order Comments: No lifting heavier than 20 lbs     No driving until:   Order Comments: No driving while taking narcotics     Pelvic Rest   Order Comments: NOTHING in vagina until seen in clinic     Notify your health care provider if you experience any of the following:  temperature >100.4     Notify  your health care provider if you experience any of the following:  persistent nausea and vomiting or diarrhea     Notify your health care provider if you experience any of the following:  severe uncontrolled pain     Notify your health care provider if you experience any of the following:  redness, tenderness, or signs of infection (pain, swelling, redness, odor or green/yellow discharge around incision site)     Notify your health care provider if you experience any of the following:   Order Comments: Vaginal bleeding heavier than a period for 2 hours or more     Activity as tolerated     Medications:  Reconciled Home Medications:      Medication List      CONTINUE taking these medications    * acetaminophen 325 mg Cap  Take 325 mg by mouth.     * acetaminophen 650 MG Tbsr  Commonly known as: TYLENOL  Take 1 tablet (650 mg total) by mouth every 8 (eight) hours as needed (pain).     b complex vitamins tablet  Take 1 tablet by mouth once daily.     cetirizine 10 MG tablet  Commonly known as: ZYRTEC  Take 10 mg by mouth once daily.     cholecalciferol (vitamin D3) 1,250 mcg (50,000 unit) capsule  Take 50,000 Units by mouth every 7 days.     clopidogreL 75 mg tablet  Commonly known as: PLAVIX  Take 75 mg by mouth once daily.     EUTHYROX 75 MCG tablet  Generic drug: levothyroxine  Take 75 mcg by mouth once daily.     fish oil-omega-3 fatty acids 300-1,000 mg capsule  Take 1 capsule by mouth once daily.     folic acid 1 MG tablet  Commonly known as: FOLVITE  Take 1,000 mcg by mouth once daily.     furosemide 20 MG tablet  Commonly known as: LASIX  Take 20 mg by mouth 2 (two) times daily.     hydroCHLOROthiazide 25 MG tablet  Commonly known as: HYDRODIURIL  Take 25 mg by mouth once daily.     HYDROcodone-acetaminophen 5-325 mg per tablet  Commonly known as: NORCO  Take 1 tablet by mouth every 6 (six) hours as needed for Pain.     losartan 50 MG tablet  Commonly known as: COZAAR  Take 50 mg by mouth once daily.      melatonin 1 mg Chew  Take 1 tablet by mouth. 5MG     MG-PLUS-PROTEIN 133 mg Tab  Generic drug: magnesium oxide-Mg AA chelate  Take by mouth.     multivitamin capsule  Take 1 capsule by mouth once daily.     ondansetron 4 MG Tbdl  Commonly known as: ZOFRAN-ODT  Take 4 mg by mouth every 8 (eight) hours as needed.     pantoprazole 40 MG tablet  Commonly known as: PROTONIX  Take 40 mg by mouth 2 (two) times daily.     potassium chloride 20 mEq  Commonly known as: K-TAB  Take 20 mEq by mouth once daily.     traMADoL 50 mg tablet  Commonly known as: ULTRAM  Take 50 mg by mouth every 6 (six) hours as needed for Pain.         * This list has 2 medication(s) that are the same as other medications prescribed for you. Read the directions carefully, and ask your doctor or other care provider to review them with you.                Kai Arvizu MD  Gynecologic Oncology  [facility] - Intensive Care ([facility]

## 2023-02-08 ENCOUNTER — PATIENT MESSAGE (OUTPATIENT)
Dept: RESEARCH | Facility: HOSPITAL | Age: 83
End: 2023-02-08
Payer: MEDICARE

## 2023-02-14 ENCOUNTER — PATIENT MESSAGE (OUTPATIENT)
Dept: RESEARCH | Facility: HOSPITAL | Age: 83
End: 2023-02-14
Payer: MEDICARE

## 2023-03-09 ENCOUNTER — HOSPITAL ENCOUNTER (OUTPATIENT)
Dept: RADIOLOGY | Facility: HOSPITAL | Age: 83
Discharge: HOME | End: 2023-03-09
Attending: STUDENT IN AN ORGANIZED HEALTH CARE EDUCATION/TRAINING PROGRAM
Payer: MEDICARE

## 2023-03-09 DIAGNOSIS — Z12.31 ENCOUNTER FOR SCREENING MAMMOGRAM FOR BREAST CANCER: ICD-10-CM

## 2023-03-09 PROCEDURE — 77067 SCR MAMMO BI INCL CAD: CPT | Mod: TC

## 2023-03-09 PROCEDURE — 77067 MAMMO DIGITAL SCREENING BILAT WITH TOMO: ICD-10-PCS | Mod: 26,,, | Performed by: STUDENT IN AN ORGANIZED HEALTH CARE EDUCATION/TRAINING PROGRAM

## 2023-03-09 PROCEDURE — 77063 MAMMO DIGITAL SCREENING BILAT WITH TOMO: ICD-10-PCS | Mod: 26,,, | Performed by: STUDENT IN AN ORGANIZED HEALTH CARE EDUCATION/TRAINING PROGRAM

## 2023-03-09 PROCEDURE — 77063 BREAST TOMOSYNTHESIS BI: CPT | Mod: 26,,, | Performed by: STUDENT IN AN ORGANIZED HEALTH CARE EDUCATION/TRAINING PROGRAM

## 2023-03-09 PROCEDURE — 77067 SCR MAMMO BI INCL CAD: CPT | Mod: 26,,, | Performed by: STUDENT IN AN ORGANIZED HEALTH CARE EDUCATION/TRAINING PROGRAM

## 2023-05-02 ENCOUNTER — OFFICE VISIT (OUTPATIENT)
Dept: GYNECOLOGIC ONCOLOGY | Facility: CLINIC | Age: 83
End: 2023-05-02
Payer: MEDICARE

## 2023-05-02 VITALS
HEIGHT: 65 IN | DIASTOLIC BLOOD PRESSURE: 78 MMHG | BODY MASS INDEX: 44.35 KG/M2 | WEIGHT: 266.19 LBS | SYSTOLIC BLOOD PRESSURE: 136 MMHG | HEART RATE: 84 BPM

## 2023-05-02 DIAGNOSIS — C54.1 ENDOMETRIAL CANCER: Primary | ICD-10-CM

## 2023-05-02 PROCEDURE — 99214 PR OFFICE/OUTPT VISIT, EST, LEVL IV, 30-39 MIN: ICD-10-PCS | Mod: S$GLB,,, | Performed by: OBSTETRICS & GYNECOLOGY

## 2023-05-02 PROCEDURE — 99214 OFFICE O/P EST MOD 30 MIN: CPT | Mod: S$GLB,,, | Performed by: OBSTETRICS & GYNECOLOGY

## 2023-05-02 NOTE — PROGRESS NOTES
Subjective:      Patient ID: Adriana Murphy is a 82 y.o. female.    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 Frias 12/17/22  Extensive PVD with bilateral iliac stents and anticoagulation      HPI  Here today for continued f/u.  Has not had any issues since last visit.  Denies VB.  Reports normal bladder and bowel.  Review of Systems   Constitutional:  Negative for activity change, appetite change, chills, fatigue and fever.   HENT:  Negative for hearing loss, mouth sores, nosebleeds, sore throat and tinnitus.    Eyes:  Negative for visual disturbance.   Respiratory:  Negative for cough, chest tightness, shortness of breath and wheezing.    Cardiovascular:  Negative for chest pain and leg swelling.   Gastrointestinal:  Negative for abdominal distention, abdominal pain, blood in stool, constipation, diarrhea, nausea and vomiting.   Genitourinary:  Negative for dysuria, flank pain, frequency, hematuria, pelvic pain, vaginal bleeding, vaginal discharge and vaginal pain.   Musculoskeletal:  Negative for arthralgias and back pain.   Skin:  Negative for rash.   Neurological:  Negative for dizziness, seizures, syncope, weakness and numbness.   Hematological:  Does not bruise/bleed easily.   Psychiatric/Behavioral:  Negative for confusion and sleep disturbance. The patient is not nervous/anxious.      Objective:   Physical Exam:   Constitutional: She is oriented to person, place, and time. She appears well-developed and well-nourished. No distress.    HENT:   Head: Normocephalic and atraumatic.    Eyes: No scleral icterus.     Cardiovascular:       Exam reveals no cyanosis and no edema.        Pulmonary/Chest: Effort normal. No respiratory distress. She exhibits no tenderness.        Abdominal: Soft. She exhibits no distension (incisions well-healed), no fluid  abdominal pain wave and no mass. There is no abdominal tenderness. There is no rebound and no guarding. No hernia.     Genitourinary:    Vagina and rectum normal.      Pelvic exam was performed with patient supine.   Labial bartholins normal.There is no rash, tenderness or lesion on the right labia. There is no rash, tenderness or lesion on the left labia. Right adnexum displays no mass, no tenderness and no fullness. Left adnexum displays no mass, no tenderness and no fullness. Vaginal cuff normal.  No  no vaginal discharge, bleeding (cuff intact and healing) or unspecified prolapse of vaginal walls in the vagina. Cervix is absent.Uterus is absent.           Musculoskeletal: Normal range of motion and moves all extremeties. No edema.      Lymphadenopathy:     She has no cervical adenopathy.    Neurological: She is alert and oriented to person, place, and time.    Skin: Skin is warm and dry. No cyanosis. No pallor.    Psychiatric: She has a normal mood and affect. Her behavior is normal.     Assessment:     1. Endometrial cancer        Plan:       SATISH on exam.  RTC 4 months.

## 2023-07-20 ENCOUNTER — OFFICE VISIT (OUTPATIENT)
Dept: URGENT CARE | Facility: CLINIC | Age: 83
End: 2023-07-20
Payer: MEDICARE

## 2023-07-20 VITALS
DIASTOLIC BLOOD PRESSURE: 80 MMHG | RESPIRATION RATE: 18 BRPM | TEMPERATURE: 99 F | HEIGHT: 65 IN | OXYGEN SATURATION: 96 % | HEART RATE: 83 BPM | BODY MASS INDEX: 44.32 KG/M2 | SYSTOLIC BLOOD PRESSURE: 128 MMHG | WEIGHT: 266 LBS

## 2023-07-20 DIAGNOSIS — U07.1 COVID-19 VIRUS DETECTED: ICD-10-CM

## 2023-07-20 DIAGNOSIS — U07.1 COVID-19: ICD-10-CM

## 2023-07-20 DIAGNOSIS — R05.9 COUGH, UNSPECIFIED TYPE: Primary | ICD-10-CM

## 2023-07-20 LAB
CTP QC/QA: YES
CTP QC/QA: YES
POC MOLECULAR INFLUENZA A AGN: NEGATIVE
POC MOLECULAR INFLUENZA B AGN: NEGATIVE
SARS-COV-2 RDRP RESP QL NAA+PROBE: POSITIVE

## 2023-07-20 PROCEDURE — 87635 SARS-COV-2 COVID-19 AMP PRB: CPT | Mod: QW,,, | Performed by: NURSE PRACTITIONER

## 2023-07-20 PROCEDURE — 99203 OFFICE O/P NEW LOW 30 MIN: CPT | Mod: S$PBB,,, | Performed by: NURSE PRACTITIONER

## 2023-07-20 PROCEDURE — 87502 POCT INFLUENZA A/B MOLECULAR: ICD-10-PCS | Mod: QW,,, | Performed by: NURSE PRACTITIONER

## 2023-07-20 PROCEDURE — 87502 INFLUENZA DNA AMP PROBE: CPT | Mod: QW,,, | Performed by: NURSE PRACTITIONER

## 2023-07-20 PROCEDURE — 99203 PR OFFICE/OUTPT VISIT, NEW, LEVL III, 30-44 MIN: ICD-10-PCS | Mod: S$PBB,,, | Performed by: NURSE PRACTITIONER

## 2023-07-20 PROCEDURE — 87635: ICD-10-PCS | Mod: QW,,, | Performed by: NURSE PRACTITIONER

## 2023-07-20 NOTE — PATIENT INSTRUCTIONS
Take Mucinex as directed for cough.  Take prescription Molnupiravir as directed    Increase oral fluids, take daily vitamins as directed, Self quarantine for 5 days.    Go to ER for worsening symptoms including shortness of breath, fever, or worsening symptoms.     POSITIVE COVID TEST    You have tested positive for COVID-19 today.  Please note that patients who test positive for COVID-19 are required by the CDC to undergo isolation for 5 days     However, if you are asymptomatic (a person who does not have any symptoms) and COVID-19 positive, your 5-day isolation begins on the day you tested positive, regardless of exposure date.    Also, per the CDC guidelines, once your 5 days have passed, and you have not had fever greater than 100.4F in the last 24 hours without taking any fever reducers such as Tylenol (Acetaminophen) or Motrin (Ibuprofen), you may return to your normal activities including social distancing, wearing masks, and frequent handwashing - YOU DO NOT NEED ANOTHER TEST IN ORDER TO END YOUR QUARANTINE.

## 2023-07-20 NOTE — PROGRESS NOTES
"Subjective:      Patient ID: Adriana Murphy is a 83 y.o. female.    Vitals:  height is 5' 5" (1.651 m) and weight is 120.7 kg (266 lb). Her oral temperature is 99 °F (37.2 °C). Her blood pressure is 128/80 and her pulse is 83. Her respiration is 18 and oxygen saturation is 96%.     Chief Complaint: Fatigue     Patient is a 83 y.o. female who presents to urgent care with complaints of weakness, chills, loss of appetite, runny nose, cough, body aches x3 days. Patient denies sore throat.      Constitution: Positive for chills and fatigue.   HENT:  Positive for congestion.    Respiratory:  Positive for cough.     Objective:     Physical Exam   Constitutional: She is oriented to person, place, and time. She appears well-developed. She is cooperative.  Non-toxic appearance. She does not appear ill. No distress.   HENT:   Head: Normocephalic and atraumatic.   Ears:   Right Ear: Hearing, tympanic membrane, external ear and ear canal normal.   Left Ear: Hearing, tympanic membrane, external ear and ear canal normal.   Nose: Nose normal. No mucosal edema, rhinorrhea or nasal deformity. No epistaxis. Right sinus exhibits no maxillary sinus tenderness and no frontal sinus tenderness. Left sinus exhibits no maxillary sinus tenderness and no frontal sinus tenderness.   Mouth/Throat: Uvula is midline, oropharynx is clear and moist and mucous membranes are normal. No trismus in the jaw. Normal dentition. No uvula swelling. No oropharyngeal exudate, posterior oropharyngeal edema or posterior oropharyngeal erythema.   Eyes: Conjunctivae and lids are normal. No scleral icterus.   Neck: Trachea normal and phonation normal. Neck supple. No edema present. No erythema present. No neck rigidity present.   Cardiovascular: Normal rate, regular rhythm, normal heart sounds and normal pulses.   Pulmonary/Chest: Effort normal and breath sounds normal. No respiratory distress. She has no decreased breath sounds. She has no rhonchi. "   Abdominal: Normal appearance.   Musculoskeletal: Normal range of motion.         General: No deformity. Normal range of motion.   Neurological: She is alert and oriented to person, place, and time. She exhibits normal muscle tone. Coordination normal.   Skin: Skin is warm, dry, intact, not diaphoretic and not pale.   Psychiatric: Her speech is normal and behavior is normal. Judgment and thought content normal.   Nursing note and vitals reviewed.    Assessment:     1. Cough, unspecified type    2. COVID-19      Office Visit on 07/20/2023   Component Date Value Ref Range Status    POC Rapid COVID 07/20/2023 Positive (A)  Negative Final     Acceptable 07/20/2023 Yes   Final    POC Molecular Influenza A Ag 07/20/2023 Negative  Negative, Not Reported Final    POC Molecular Influenza B Ag 07/20/2023 Negative  Negative, Not Reported Final     Acceptable 07/20/2023 Yes   Final      Plan:   Take Mucinex as directed for cough.  Take prescription Molnupiravir as directed    Increase oral fluids, take daily vitamins as directed, Self quarantine for 5 days.    Go to ER for worsening symptoms including shortness of breath, fever, or worsening symptoms.        Cough, unspecified type  -     POCT COVID-19 Rapid Screening  -     POCT Influenza A/B Molecular    COVID-19  -     molnupiravir 200 mg capsule (EUA); Take 4 capsules (800 mg total) by mouth every 12 (twelve) hours. for 5 days  Dispense: 40 capsule; Refill: 0

## 2023-09-05 ENCOUNTER — OFFICE VISIT (OUTPATIENT)
Dept: GYNECOLOGIC ONCOLOGY | Facility: CLINIC | Age: 83
End: 2023-09-05
Payer: MEDICARE

## 2023-09-05 VITALS
HEART RATE: 80 BPM | BODY MASS INDEX: 44.26 KG/M2 | WEIGHT: 265.63 LBS | DIASTOLIC BLOOD PRESSURE: 81 MMHG | SYSTOLIC BLOOD PRESSURE: 172 MMHG | HEIGHT: 65 IN

## 2023-09-05 DIAGNOSIS — C54.1 ENDOMETRIAL CANCER: Primary | ICD-10-CM

## 2023-09-05 PROCEDURE — 99214 OFFICE O/P EST MOD 30 MIN: CPT | Mod: S$GLB,,, | Performed by: OBSTETRICS & GYNECOLOGY

## 2023-09-05 PROCEDURE — 99214 PR OFFICE/OUTPT VISIT, EST, LEVL IV, 30-39 MIN: ICD-10-PCS | Mod: S$GLB,,, | Performed by: OBSTETRICS & GYNECOLOGY

## 2023-09-05 NOTE — PROGRESS NOTES
Subjective:      Patient ID: Adriana Murphy is a 83 y.o. female.    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 Frias 12/17/22  Extensive PVD with bilateral iliac stents and anticoagulation      HPI  Here today for continued f/u.  Has not had any issues since last visit.  Denies VB.  Reports normal bladder and bowel.  Review of Systems   Constitutional:  Negative for activity change, appetite change, chills, fatigue and fever.   HENT:  Negative for hearing loss, mouth sores, nosebleeds, sore throat and tinnitus.    Eyes:  Negative for visual disturbance.   Respiratory:  Negative for cough, chest tightness, shortness of breath and wheezing.    Cardiovascular:  Negative for chest pain and leg swelling.   Gastrointestinal:  Negative for abdominal distention, abdominal pain, blood in stool, constipation, diarrhea, nausea and vomiting.   Genitourinary:  Negative for dysuria, flank pain, frequency, hematuria, pelvic pain, vaginal bleeding, vaginal discharge and vaginal pain.   Musculoskeletal:  Negative for arthralgias and back pain.   Skin:  Negative for rash.   Neurological:  Negative for dizziness, seizures, syncope, weakness and numbness.   Hematological:  Does not bruise/bleed easily.   Psychiatric/Behavioral:  Negative for confusion and sleep disturbance. The patient is not nervous/anxious.        Objective:   Physical Exam:   Constitutional: She is oriented to person, place, and time. She appears well-developed and well-nourished. No distress.    HENT:   Head: Normocephalic and atraumatic.    Eyes: No scleral icterus.     Cardiovascular:       Exam reveals no cyanosis and no edema.        Pulmonary/Chest: Effort normal. No respiratory distress. She exhibits no tenderness.        Abdominal: Soft. She exhibits no distension (incisions  well-healed), no fluid wave and no mass. There is no abdominal tenderness. There is no rebound and no guarding. No hernia.     Genitourinary:    Vagina and rectum normal.      Pelvic exam was performed with patient supine.   Labial bartholins normal.There is no rash, tenderness or lesion on the right labia. There is no rash, tenderness or lesion on the left labia. Right adnexum displays no mass, no tenderness and no fullness. Left adnexum displays no mass, no tenderness and no fullness. Vaginal cuff normal.  No  no vaginal discharge, bleeding (cuff intact and healing) or unspecified prolapse of vaginal walls in the vagina. Cervix is absent.Uterus is absent.           Musculoskeletal: Normal range of motion and moves all extremeties. No edema.      Lymphadenopathy:     She has no cervical adenopathy.    Neurological: She is alert and oriented to person, place, and time.    Skin: Skin is warm and dry. No cyanosis. No pallor.    Psychiatric: She has a normal mood and affect. Her behavior is normal.       Assessment:     1. Endometrial cancer        Plan:       SATISH on exam.  RTC 3 months.

## 2023-10-12 ENCOUNTER — HOSPITAL ENCOUNTER (EMERGENCY)
Facility: HOSPITAL | Age: 83
Discharge: HOME | End: 2023-10-12
Attending: EMERGENCY MEDICINE
Payer: MEDICARE

## 2023-10-12 VITALS
OXYGEN SATURATION: 99 % | TEMPERATURE: 98 F | SYSTOLIC BLOOD PRESSURE: 130 MMHG | WEIGHT: 266 LBS | HEART RATE: 77 BPM | RESPIRATION RATE: 20 BRPM | BODY MASS INDEX: 44.32 KG/M2 | DIASTOLIC BLOOD PRESSURE: 76 MMHG | HEIGHT: 65 IN

## 2023-10-12 DIAGNOSIS — R52 PAIN: ICD-10-CM

## 2023-10-12 DIAGNOSIS — M19.012 PRIMARY OSTEOARTHRITIS, LEFT SHOULDER: ICD-10-CM

## 2023-10-12 DIAGNOSIS — S93.402A MODERATE LEFT ANKLE SPRAIN, INITIAL ENCOUNTER: Primary | ICD-10-CM

## 2023-10-12 DIAGNOSIS — M25.572 ACUTE LEFT ANKLE PAIN: ICD-10-CM

## 2023-10-12 PROCEDURE — 99284 EMERGENCY DEPT VISIT MOD MDM: CPT

## 2023-10-12 RX ORDER — TRAMADOL HYDROCHLORIDE 50 MG/1
50 TABLET ORAL EVERY 6 HOURS PRN
Qty: 28 TABLET | Refills: 0 | Status: SHIPPED | OUTPATIENT
Start: 2023-10-12 | End: 2023-10-19

## 2023-10-12 NOTE — ED PROVIDER NOTES
"Encounter Date: 10/12/2023       History     Chief Complaint   Patient presents with    Foot Injury     Pt c/o p ain to left foot onset this morning while standing on her tip toes reaching up.     The history is provided by the patient.   Foot Injury   The incident occurred at home. Injury mechanism: standing on toes and felt a "pop" followed by immediate pain. The incident occurred 3 to 5 hours ago. The pain is present in the left ankle. The pain has been Constant since onset. The symptoms are aggravated by bearing weight and palpation. She has tried nothing for the symptoms.   Also notes increasing left shoulder pain over the past few weeks - denies injury.    Review of patient's allergies indicates:   Allergen Reactions    Cefazolin Rash     Other reaction(s): BLISTERS    Iodine Nausea And Vomiting and Rash      / CAN NOT EAT SHELLFISH CAN NOT USE BETADINE    Ibuprofen Rash     Almost all NSAIDS - CAN TAKE TYLENOL AND TORADOL    Aspirin      Other reaction(s): Rectal bleeding, Stomach pain    Chocolate flavor     Morphine Hallucinations     States she can take Tramadol and Codeine for pain    Propoxyphene Hallucinations    Sulfa (sulfonamide antibiotics)      Past Medical History:   Diagnosis Date    Anticoagulant long-term use     Arthritis     Asthma     DVT (deep vein thrombosis) in pregnancy     GERD (gastroesophageal reflux disease)     Hypertension     PAD (peripheral artery disease)     Post-menopausal bleeding     Pulmonary embolus     Thyroid disease     HYPO     Past Surgical History:   Procedure Laterality Date    APPENDECTOMY      btl      CATARACT EXTRACTION Bilateral     CHOLECYSTECTOMY      DILATION AND CURETTAGE OF UTERUS      EXAMINATION UNDER ANESTHESIA N/A 12/20/2022    Procedure: EXAM UNDER ANESTHESIA;  Surgeon: Vargas Frias MD;  Location: [facility];  Service: OB/GYN;  Laterality: N/A;    EYE SURGERY      ivc filter      RE-EXPLORATION FOR BLEEDING N/A 12/20/2022    Procedure: " RE-EXPLORATION, FOR BLEEDING;  Surgeon: Vargas Frias MD;  Location: [facility] OR;  Service: OB/GYN;  Laterality: N/A;    REPAIR OF VAGINAL CUFF  12/20/2022    Procedure: REPAIR, VAGINAL CUFF;  Surgeon: Vargas Frias MD;  Location: [facility] OR;  Service: OB/GYN;;    ROBOT-ASSISTED LAPAROSCOPIC ABDOMINAL HYSTERECTOMY USING DA JESSICA XI N/A 11/15/2022    Procedure: XI ROBOTIC HYSTERECTOMY;  Surgeon: Hugo Bradford MD;  Location: [facility] OR;  Service: OB/GYN;  Laterality: N/A;    ROBOT-ASSISTED LAPAROSCOPIC SALPINGO-OOPHORECTOMY USING DA JESSICA XI Bilateral 11/15/2022    Procedure: XI ROBOTIC SALPINGO-OOPHORECTOMY;  Surgeon: Hugo Bradford MD;  Location: [facility] OR;  Service: OB/GYN;  Laterality: Bilateral;    SHOULDER SURGERY Right 2017     No family history on file.  Social History     Tobacco Use    Smoking status: Never    Smokeless tobacco: Never   Substance Use Topics    Alcohol use: Not Currently    Drug use: Never     Review of Systems   Constitutional:  Negative for fever.   HENT:  Negative for sore throat.    Respiratory:  Negative for shortness of breath.    Cardiovascular:  Negative for chest pain.   Gastrointestinal:  Negative for nausea.   Genitourinary:  Negative for dysuria.   Musculoskeletal:  Negative for back pain.   Skin:  Negative for rash.   Neurological:  Negative for weakness.   Hematological:  Does not bruise/bleed easily.       Physical Exam     Initial Vitals [10/12/23 1603]   BP Pulse Resp Temp SpO2   130/76 77 20 98.1 °F (36.7 °C) 99 %      MAP       --         Physical Exam    Nursing note and vitals reviewed.  Constitutional: She appears well-developed and well-nourished.   HENT:   Head: Normocephalic and atraumatic.   Right Ear: External ear normal.   Left Ear: External ear normal.   Eyes: Conjunctivae and EOM are normal. Pupils are equal, round, and reactive to light.   Neck: Neck supple.   Normal range of motion.  Cardiovascular:  Normal rate, regular rhythm, normal heart sounds and intact  distal pulses.           Pulmonary/Chest: Breath sounds normal.   Abdominal: Abdomen is soft. Bowel sounds are normal.   Musculoskeletal:      Left shoulder: Decreased range of motion.        Arms:       Cervical back: Normal range of motion and neck supple.        Feet:      Neurological: She is alert and oriented to person, place, and time. GCS score is 15. GCS eye subscore is 4. GCS verbal subscore is 5. GCS motor subscore is 6.   Skin: Skin is warm and dry. Capillary refill takes less than 2 seconds.   Psychiatric: She has a normal mood and affect. Her behavior is normal. Judgment and thought content normal.         ED Course   Procedures  Labs Reviewed - No data to display       Imaging Results              X-Ray Ankle Complete Left (Preliminary result)  Result time 10/12/23 16:59:30      Wet Read by Eleazar Lee MD (10/12/23 16:59:30, [facility], Emergency Medicine)    Soft tissue swelling, no fracture or dislocation                                     X-Ray Shoulder Trauma Left (Preliminary result)  Result time 10/12/23 16:59:53      Wet Read by Eleazar Lee MD (10/12/23 16:59:53, [facility] Dept, Emergency Medicine)    Advanced osteoarthritis                                     Medications - No data to display  Medical Decision Making  Differential includes:  sprain, strain, fracture, OA, RA.  Will obtain ankle and left shoulder x-rays.                               Clinical Impression:   Final diagnoses:  [R52] Pain  [M25.572] Acute left ankle pain  [S93.402A] Moderate left ankle sprain, initial encounter (Primary)  [M19.012] Primary osteoarthritis, left shoulder        ED Disposition Condition    Discharge Stable          ED Prescriptions       Medication Sig Dispense Start Date End Date Auth. Provider    traMADoL (ULTRAM) 50 mg tablet Take 1 tablet (50 mg total) by mouth every 6 (six) hours as needed for Pain. 28  tablet 10/12/2023 10/19/2023 Eleazar Lee MD          Follow-up Information       Follow up With Specialties Details Why Contact Info    Fili Jones MD Orthopedic Surgery Schedule an appointment as soon as possible for a visit in 1 week  [address]  [address]  [facility] [place] [address]  [phone]               Eleazar Lee MD  10/12/23 1029

## 2023-10-19 ENCOUNTER — OFFICE VISIT (OUTPATIENT)
Dept: ORTHOPEDICS | Facility: CLINIC | Age: 83
End: 2023-10-19
Payer: MEDICARE

## 2023-10-19 VITALS
HEART RATE: 80 BPM | BODY MASS INDEX: 44.32 KG/M2 | DIASTOLIC BLOOD PRESSURE: 92 MMHG | SYSTOLIC BLOOD PRESSURE: 155 MMHG | WEIGHT: 266 LBS | HEIGHT: 65 IN

## 2023-10-19 DIAGNOSIS — M12.812 LEFT ROTATOR CUFF TEAR ARTHROPATHY: Primary | ICD-10-CM

## 2023-10-19 DIAGNOSIS — S93.402A SPRAIN OF LEFT ANKLE, UNSPECIFIED LIGAMENT, INITIAL ENCOUNTER: ICD-10-CM

## 2023-10-19 DIAGNOSIS — E66.01 MORBID OBESITY: ICD-10-CM

## 2023-10-19 DIAGNOSIS — Z86.711 HISTORY OF PULMONARY EMBOLISM: ICD-10-CM

## 2023-10-19 DIAGNOSIS — M75.102 LEFT ROTATOR CUFF TEAR ARTHROPATHY: Primary | ICD-10-CM

## 2023-10-19 PROCEDURE — 20610 DRAIN/INJ JOINT/BURSA W/O US: CPT | Mod: LT,,, | Performed by: ORTHOPAEDIC SURGERY

## 2023-10-19 PROCEDURE — 99204 PR OFFICE/OUTPT VISIT, NEW, LEVL IV, 45-59 MIN: ICD-10-PCS | Mod: 25,,, | Performed by: ORTHOPAEDIC SURGERY

## 2023-10-19 PROCEDURE — 99204 OFFICE O/P NEW MOD 45 MIN: CPT | Mod: 25,,, | Performed by: ORTHOPAEDIC SURGERY

## 2023-10-19 PROCEDURE — 20610 LARGE JOINT ASPIRATION/INJECTION: L SUBACROMIAL BURSA: ICD-10-PCS | Mod: LT,,, | Performed by: ORTHOPAEDIC SURGERY

## 2023-10-19 RX ORDER — LIDOCAINE HYDROCHLORIDE 20 MG/ML
2 INJECTION, SOLUTION INFILTRATION; PERINEURAL
Status: DISCONTINUED | OUTPATIENT
Start: 2023-10-19 | End: 2023-10-19 | Stop reason: HOSPADM

## 2023-10-19 RX ORDER — BETAMETHASONE SODIUM PHOSPHATE AND BETAMETHASONE ACETATE 3; 3 MG/ML; MG/ML
6 INJECTION, SUSPENSION INTRA-ARTICULAR; INTRALESIONAL; INTRAMUSCULAR; SOFT TISSUE
Status: DISCONTINUED | OUTPATIENT
Start: 2023-10-19 | End: 2023-10-19 | Stop reason: HOSPADM

## 2023-10-19 RX ADMIN — BETAMETHASONE SODIUM PHOSPHATE AND BETAMETHASONE ACETATE 6 MG: 3; 3 INJECTION, SUSPENSION INTRA-ARTICULAR; INTRALESIONAL; INTRAMUSCULAR; SOFT TISSUE at 02:10

## 2023-10-19 RX ADMIN — LIDOCAINE HYDROCHLORIDE 2 ML: 20 INJECTION, SOLUTION INFILTRATION; PERINEURAL at 02:10

## 2023-10-19 NOTE — PROCEDURES
Large Joint Aspiration/Injection: L subacromial bursa    Date/Time: 10/19/2023 2:15 PM    Performed by: Fili Jones MD  Authorized by: Fili Jones MD    Consent Done?:  Yes (Verbal)  Indications:  Pain  Site marked: the procedure site was marked    Timeout: prior to procedure the correct patient, procedure, and site was verified    Prep: patient was prepped and draped in usual sterile fashion    Approach:  Posterior  Location:  Shoulder  Site:  L subacromial bursa  Medications:  2 mL LIDOcaine HCL 20 mg/ml (2%) 20 mg/mL (2 %); 6 mg betamethasone acetate-betamethasone sodium phosphate 6 mg/mL  Patient tolerance:  Patient tolerated the procedure well with no immediate complications

## 2023-10-19 NOTE — PROGRESS NOTES
[facility]  Orthopedic Clinic Note      Chief Complaint:   Chief Complaint   Patient presents with    Shoulder Pain     Left shoulder pain. No prior sx. Xr in chart from 10/12/23. States shoulder has been hurting her for about 2 years and is getting worse. Had a fall the other day and went to urgent care for foot pain and got shoulder xr done there as well. States her foot is still bothering her but is aware may need referral for foot specialist.      Referring Physician: No ref. provider found      History of Present Illness:    This is a 83 y.o. year old female presenting with complaints of left shoulder pain worsening over the last two years.  This was exacerbated by a fall that occurred on 10/12/2023.  She was evaluated in the ED Patient has noticed progressive decrease in range of motion and weakness.  Patient states pain is moderate to severe.  Patient complains of night pain.  Patient been treated with over the counter medications with no improvement.  She also complains of left ankle pain since the same fall that occurred on 10/12/2023.  She states that she was standing on tiptoes when she felt a popping sensation in her foot.  She was evaluated in the ED following this injury where x-rays were performed.  X-rays demonstrated no acute osseous pathology.  She is ambulating unassisted.      Past Medical History:   Diagnosis Date    Anticoagulant long-term use     Arthritis     Asthma     DVT (deep vein thrombosis) in pregnancy     GERD (gastroesophageal reflux disease)     Hypertension     PAD (peripheral artery disease)     Post-menopausal bleeding     Pulmonary embolus     Thyroid disease     HYPO       Past Surgical History:   Procedure Laterality Date    APPENDECTOMY      btl      CATARACT EXTRACTION Bilateral     CHOLECYSTECTOMY      DILATION AND CURETTAGE OF UTERUS      EXAMINATION UNDER ANESTHESIA N/A 12/20/2022    Procedure: EXAM UNDER ANESTHESIA;  Surgeon: Vargas Frias MD;  Location:  [facility] OR;  Service: OB/GYN;  Laterality: N/A;    EYE SURGERY      ivc filter      RE-EXPLORATION FOR BLEEDING N/A 12/20/2022    Procedure: RE-EXPLORATION, FOR BLEEDING;  Surgeon: Vargas Frias MD;  Location: [facility] OR;  Service: OB/GYN;  Laterality: N/A;    REPAIR OF VAGINAL CUFF  12/20/2022    Procedure: REPAIR, VAGINAL CUFF;  Surgeon: Vargas Frias MD;  Location: [facility] OR;  Service: OB/GYN;;    ROBOT-ASSISTED LAPAROSCOPIC ABDOMINAL HYSTERECTOMY USING DA JESSICA XI N/A 11/15/2022    Procedure: XI ROBOTIC HYSTERECTOMY;  Surgeon: Hugo Bradford MD;  Location: [facility] OR;  Service: OB/GYN;  Laterality: N/A;    ROBOT-ASSISTED LAPAROSCOPIC SALPINGO-OOPHORECTOMY USING DA JESSICA XI Bilateral 11/15/2022    Procedure: XI ROBOTIC SALPINGO-OOPHORECTOMY;  Surgeon: Hugo Bradford MD;  Location: [facility] OR;  Service: OB/GYN;  Laterality: Bilateral;    SHOULDER SURGERY Right 2017       Current Outpatient Medications   Medication Sig    acetaminophen (TYLENOL) 650 MG TbSR Take 1 tablet (650 mg total) by mouth every 8 (eight) hours as needed (pain).    b complex vitamins tablet Take 1 tablet by mouth once daily.    cetirizine (ZYRTEC) 10 MG tablet Take 10 mg by mouth once daily.    cholecalciferol, vitamin D3, 1,250 mcg (50,000 unit) capsule Take 50,000 Units by mouth every 7 days.    clopidogreL (PLAVIX) 75 mg tablet Take 75 mg by mouth once daily.    EUTHYROX 75 mcg tablet Take 75 mcg by mouth once daily.    folic acid (FOLVITE) 1 MG tablet Take 1,000 mcg by mouth once daily.    furosemide (LASIX) 20 MG tablet Take 20 mg by mouth 2 (two) times daily.    hydroCHLOROthiazide (HYDRODIURIL) 25 MG tablet Take 25 mg by mouth once daily.    losartan (COZAAR) 50 MG tablet Take 50 mg by mouth once daily.    magnesium oxide-Mg AA chelate (MG-PLUS-PROTEIN) 133 mg Tab Take by mouth.    multivitamin capsule Take 1 capsule by mouth once daily.    omega-3 fatty acids/fish oil (FISH OIL-OMEGA-3 FATTY ACIDS) 300-1,000 mg capsule Take 1 capsule by  mouth once daily.    ondansetron (ZOFRAN-ODT) 4 MG TbDL Take 4 mg by mouth every 8 (eight) hours as needed.    pantoprazole (PROTONIX) 40 MG tablet Take 40 mg by mouth 2 (two) times daily.    potassium chloride (K-TAB) 20 mEq Take 20 mEq by mouth once daily.    traMADoL (ULTRAM) 50 mg tablet Take 1 tablet (50 mg total) by mouth every 6 (six) hours as needed for Pain.    melatonin 1 mg Chew Take 1 tablet by mouth. 5MG     No current facility-administered medications for this visit.       Review of patient's allergies indicates:   Allergen Reactions    Cefazolin Rash     Other reaction(s): BLISTERS    Iodine Nausea And Vomiting and Rash      / CAN NOT EAT SHELLFISH CAN NOT USE BETADINE    Ibuprofen Rash     Almost all NSAIDS - CAN TAKE TYLENOL AND TORADOL    Aspirin      Other reaction(s): Rectal bleeding, Stomach pain    Chocolate flavor     Morphine Hallucinations     States she can take Tramadol and Codeine for pain    Propoxyphene Hallucinations    Sulfa (sulfonamide antibiotics)        Family History   Problem Relation Age of Onset    No Known Problems Mother     No Known Problems Father        Social History     Socioeconomic History    Marital status:    Tobacco Use    Smoking status: Never    Smokeless tobacco: Never   Substance and Sexual Activity    Alcohol use: Not Currently    Drug use: Never    Sexual activity: Not Currently     Partners: Male     Social Determinants of Health     Financial Resource Strain: Low Risk  (11/16/2022)    Overall Financial Resource Strain (CARDIA)     Difficulty of Paying Living Expenses: Not hard at all   Food Insecurity: No Food Insecurity (11/16/2022)    Hunger Vital Sign     Worried About Running Out of Food in the Last Year: Never true     Ran Out of Food in the Last Year: Never true   Transportation Needs: No Transportation Needs (11/16/2022)    PRAPARE - Transportation     Lack of Transportation (Medical): No     Lack of Transportation (Non-Medical): No   Physical  "Activity: Inactive (11/16/2022)    Exercise Vital Sign     Days of Exercise per Week: 0 days     Minutes of Exercise per Session: 0 min   Stress: No Stress Concern Present (11/16/2022)    [employer] - Occupational Stress Questionnaire     Feeling of Stress : Only a little   Social Connections: Moderately Integrated (11/16/2022)    Social Connection and Isolation Panel [NHANES]     Frequency of Communication with Friends and Family: More than three times a week     Frequency of Social Gatherings with Friends and Family: More than three times a week     Attends Zoroastrian Services: More than 4 times per year     Active Member of Clubs or Organizations: No     Attends Club or Organization Meetings: Never     Marital Status:    Housing Stability: Low Risk  (11/16/2022)    Housing Stability Vital Sign     Unable to Pay for Housing in the Last Year: No     Number of Places Lived in the Last Year: 1     Unstable Housing in the Last Year: No           Review of Systems:  All review of systems negative except for those stated in the HPI.    Examination:    Vital Signs:    Vitals:    10/19/23 1433   BP: (!) 155/92   Pulse: 80   Weight: 120.7 kg (266 lb)   Height: 5' 5" (1.651 m)       Body mass index is 44.26 kg/m².    Physical Examination:  General: Well-developed, well-nourished.  Neuro: Alert and oriented x 3.  Psych: Normal mood and affect.  Card: Regular rate and rhythm  Resp: Respirations regular and unlabored  Left Shoulder Exam:  No obvious deformity. No medial or lateral scapula winging.  Forward flexion to 120 degrees.  Abduction to 120 degrees.  External and internal rotation to 50 degrees.  4/5 strength, normal skin appearance and palpable pulses distally. Sensibility normal.  Left Ankle Exam:  No obvious deformity.  Decreased range of motion secondary to pain.  Mildly positive squeeze test. Negative lateral malleolus tenderness. Negative medial malleolus tenderness.  Positive " talofibular ligament tenderness. Negative anterior draw and lateral tilt. 3/5 strength, normal skin appearance and palpable pulses distally. Sensibility normal.      Imaging:  Prior three views of the left shoulder demonstrated advanced degenerative changes with bone-on-bone articulation of the glenohumeral joint.  There is also acetabularization of the acromion and osteophyte formation.  Prior three views of the left ankle demonstrate no acute osseous pathology.  There is some evidence of diffuse degenerative changes.    Assessment: Left rotator cuff tear arthropathy  -     Large Joint Aspiration/Injection: L subacromial bursa    Sprain of left ankle, unspecified ligament, initial encounter    History of pulmonary embolism    Morbid obesity        Plan:  Prior x-rays were reviewed with the patient.  In regards to her ankle, plan to place her in an ankle splint.  Advised that she wear this for the next 4-6 weeks and then begin to transition into a regular shoe.  We discussed multiple options for her shoulder, including continued observation, medications, and corticosteroid injections.  We also discussed that due to the severity of her degenerative changes, surgical intervention via reverse total shoulder arthroplasty is likely the only definitive treatment for her symptoms.  However, we will definitely need to obtain preoperative clearances.  She would like to take some time to discuss surgery with her daughters.  Plan to proceed with left subacromial corticosteroid injection today.  She will continue over-the-counter medications as needed for pain.  Avoid anti-inflammatories secondary to chronic anticoagulation.  She will return to clinic in approximately 3 months for re-evaluation.  She verbalized understanding of the plan of care with no further questions.    Fili Jones MD personally performed the services described in this documentation, including but not limited to patient's history, physical examination, and  assessment and plan of care. All medical record entries made by YASEMIN Mcfarlane were performed at his direction and in his presence. The medical record was reviewed and is accurate and complete.      Large Joint Aspiration/Injection: L subacromial bursa    Date/Time: 10/19/2023 2:15 PM    Performed by: Fili Jones MD  Authorized by: Fili Jones MD    Consent Done?:  Yes (Verbal)  Indications:  Pain  Site marked: the procedure site was marked    Timeout: prior to procedure the correct patient, procedure, and site was verified    Prep: patient was prepped and draped in usual sterile fashion    Approach:  Posterior  Location:  Shoulder  Site:  L subacromial bursa  Medications:  2 mL LIDOcaine HCL 20 mg/ml (2%) 20 mg/mL (2 %); 6 mg betamethasone acetate-betamethasone sodium phosphate 6 mg/mL  Patient tolerance:  Patient tolerated the procedure well with no immediate complications      Follow up in about 3 months (around 1/19/2024) for Reevaluation.      DISCLAIMER: This note may have been dictated using voice recognition software and may contain grammatical errors.     NOTE: Consult report sent to referring provider via [employer] EMR.

## 2023-12-13 ENCOUNTER — OFFICE VISIT (OUTPATIENT)
Dept: GYNECOLOGIC ONCOLOGY | Facility: CLINIC | Age: 83
End: 2023-12-13
Payer: MEDICARE

## 2023-12-13 VITALS
HEIGHT: 65 IN | BODY MASS INDEX: 43.32 KG/M2 | HEART RATE: 91 BPM | WEIGHT: 260 LBS | DIASTOLIC BLOOD PRESSURE: 80 MMHG | SYSTOLIC BLOOD PRESSURE: 131 MMHG

## 2023-12-13 DIAGNOSIS — C54.1 ENDOMETRIAL CANCER: Primary | ICD-10-CM

## 2023-12-13 PROCEDURE — 99214 OFFICE O/P EST MOD 30 MIN: CPT | Mod: S$GLB,,,

## 2023-12-13 PROCEDURE — 99214 PR OFFICE/OUTPT VISIT, EST, LEVL IV, 30-39 MIN: ICD-10-PCS | Mod: S$GLB,,,

## 2023-12-13 NOTE — PROGRESS NOTES
Subjective:      Patient ID: Adriana Murphy is a 83 y.o. female.    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 Frias 12/17/22  Extensive PVD with bilateral iliac stents and anticoagulation    HPI  Here today for continued f/u.  Has not had any VB since 12/2022.  Reports normal bladder and bowel. Has hip and shoulder pain and plans for shoulder surgery with Dr. Jones in the next few weeks. C/o of rectal bleeding and hemorrhoids. Has appt. With GI to evaluate.   Review of Systems   Constitutional:  Negative for activity change, fatigue, fever and unexpected weight change.   Respiratory:  Negative for cough and shortness of breath.    Cardiovascular:  Negative for chest pain.   Gastrointestinal:  Negative for abdominal distention, abdominal pain, constipation and diarrhea.   Genitourinary:  Negative for difficulty urinating, dysuria, frequency, pelvic pain, urgency, vaginal bleeding and vaginal pain.   Neurological:  Negative for dizziness and weakness.       Past Medical History:   Diagnosis Date    Anticoagulant long-term use     Arthritis     Asthma     DVT (deep vein thrombosis) in pregnancy     GERD (gastroesophageal reflux disease)     Hypertension     PAD (peripheral artery disease)     Post-menopausal bleeding     Pulmonary embolus     Thyroid disease     HYPO     Past Surgical History:   Procedure Laterality Date    APPENDECTOMY      btl      CATARACT EXTRACTION Bilateral     CHOLECYSTECTOMY      DILATION AND CURETTAGE OF UTERUS      EXAMINATION UNDER ANESTHESIA N/A 12/20/2022    Procedure: EXAM UNDER ANESTHESIA;  Surgeon: Vargas Frias MD;  Location: [facility];  Service: OB/GYN;  Laterality: N/A;    EYE SURGERY      ivc filter      RE-EXPLORATION FOR BLEEDING N/A 12/20/2022    Procedure: RE-EXPLORATION, FOR BLEEDING;  Surgeon:  Vargas Frias MD;  Location: [facility] OR;  Service: OB/GYN;  Laterality: N/A;    REPAIR OF VAGINAL CUFF  12/20/2022    Procedure: REPAIR, VAGINAL CUFF;  Surgeon: Vargas Frias MD;  Location: [facility] OR;  Service: OB/GYN;;    ROBOT-ASSISTED LAPAROSCOPIC ABDOMINAL HYSTERECTOMY USING DA JESSICA XI N/A 11/15/2022    Procedure: XI ROBOTIC HYSTERECTOMY;  Surgeon: Hugo Bradford MD;  Location: [facility] OR;  Service: OB/GYN;  Laterality: N/A;    ROBOT-ASSISTED LAPAROSCOPIC SALPINGO-OOPHORECTOMY USING DA JESSICA XI Bilateral 11/15/2022    Procedure: XI ROBOTIC SALPINGO-OOPHORECTOMY;  Surgeon: Hugo Bradford MD;  Location: [facility] OR;  Service: OB/GYN;  Laterality: Bilateral;    SHOULDER SURGERY Right 2017     Family History   Problem Relation Age of Onset    No Known Problems Mother     No Known Problems Father      Social History     Socioeconomic History    Marital status:    Tobacco Use    Smoking status: Never    Smokeless tobacco: Never   Substance and Sexual Activity    Alcohol use: Not Currently    Drug use: Never    Sexual activity: Not Currently     Partners: Male     Social Determinants of Health     Financial Resource Strain: Low Risk  (11/16/2022)    Overall Financial Resource Strain (CARDIA)     Difficulty of Paying Living Expenses: Not hard at all   Food Insecurity: No Food Insecurity (11/16/2022)    Hunger Vital Sign     Worried About Running Out of Food in the Last Year: Never true     Ran Out of Food in the Last Year: Never true   Transportation Needs: No Transportation Needs (11/16/2022)    PRAPARE - Transportation     Lack of Transportation (Medical): No     Lack of Transportation (Non-Medical): No   Physical Activity: Inactive (11/16/2022)    Exercise Vital Sign     Days of Exercise per Week: 0 days     Minutes of Exercise per Session: 0 min   Stress: No Stress Concern Present (11/16/2022)    [employer] - Occupational Stress Questionnaire     Feeling of Stress : Only a  little   Social Connections: Moderately Integrated (11/16/2022)    Social Connection and Isolation Panel [NHANES]     Frequency of Communication with Friends and Family: More than three times a week     Frequency of Social Gatherings with Friends and Family: More than three times a week     Attends Temple Services: More than 4 times per year     Active Member of Clubs or Organizations: No     Attends Club or Organization Meetings: Never     Marital Status:    Housing Stability: Low Risk  (11/16/2022)    Housing Stability Vital Sign     Unable to Pay for Housing in the Last Year: No     Number of Places Lived in the Last Year: 1     Unstable Housing in the Last Year: No     Current Outpatient Medications   Medication Sig    acetaminophen (TYLENOL) 650 MG TbSR Take 1 tablet (650 mg total) by mouth every 8 (eight) hours as needed (pain).    b complex vitamins tablet Take 1 tablet by mouth once daily.    cetirizine (ZYRTEC) 10 MG tablet Take 10 mg by mouth once daily.    cholecalciferol, vitamin D3, 1,250 mcg (50,000 unit) capsule Take 50,000 Units by mouth every 7 days.    clopidogreL (PLAVIX) 75 mg tablet Take 75 mg by mouth once daily.    EUTHYROX 75 mcg tablet Take 75 mcg by mouth once daily.    folic acid (FOLVITE) 1 MG tablet Take 1,000 mcg by mouth once daily.    furosemide (LASIX) 20 MG tablet Take 20 mg by mouth 2 (two) times daily.    hydroCHLOROthiazide (HYDRODIURIL) 25 MG tablet Take 25 mg by mouth once daily.    losartan (COZAAR) 50 MG tablet Take 50 mg by mouth once daily.    magnesium oxide-Mg AA chelate (MG-PLUS-PROTEIN) 133 mg Tab Take by mouth.    melatonin 1 mg Chew Take 1 tablet by mouth. 5MG    multivitamin capsule Take 1 capsule by mouth once daily.    omega-3 fatty acids/fish oil (FISH OIL-OMEGA-3 FATTY ACIDS) 300-1,000 mg capsule Take 1 capsule by mouth once daily.    ondansetron (ZOFRAN-ODT) 4 MG TbDL Take 4 mg by mouth every 8 (eight) hours as needed.    pantoprazole (PROTONIX) 40 MG  tablet Take 40 mg by mouth 2 (two) times daily.    potassium chloride (K-TAB) 20 mEq Take 20 mEq by mouth once daily.     No current facility-administered medications for this visit.     Review of patient's allergies indicates:   Allergen Reactions    Cefazolin Rash     Other reaction(s): BLISTERS    Iodine Nausea And Vomiting and Rash      / CAN NOT EAT SHELLFISH CAN NOT USE BETADINE    Ibuprofen Rash     Almost all NSAIDS - CAN TAKE TYLENOL AND TORADOL    Aspirin      Other reaction(s): Rectal bleeding, Stomach pain    Chocolate flavor     Morphine Hallucinations     States she can take Tramadol and Codeine for pain    Propoxyphene Hallucinations    Sulfa (sulfonamide antibiotics)        Objective:   Physical Exam:   Constitutional: She is oriented to person, place, and time. She appears well-developed.        Pulmonary/Chest: Effort normal and breath sounds normal. No respiratory distress.        Abdominal: Soft. She exhibits no distension. There is no abdominal tenderness.     Genitourinary:    Genitourinary Comments: Vulva - normal  Vagina - no lesions, vaginal cuff no lesion  Cervix - surgically absent  Uterus - surgically absent  Adnexa - no masses                  Neurological: She is alert and oriented to person, place, and time.    Skin: No rash noted.    Psychiatric: She has a normal mood and affect. Judgment normal.       Assessment:     1. Endometrial cancer    The patient was seen, examined, and counseled by me.  She remains SATISH.  She will continue with routine surveillance and will contact us promptly if she has any problems or questions in the interim. She is now one year post treatment and will stretch appointments to every 6 months.     Plan:   RTC in 6 months with Dr. Bradford. Bleeding precautions discussed.

## 2024-01-25 ENCOUNTER — OFFICE VISIT (OUTPATIENT)
Dept: ORTHOPEDICS | Facility: CLINIC | Age: 84
End: 2024-01-25
Payer: MEDICARE

## 2024-01-25 VITALS
DIASTOLIC BLOOD PRESSURE: 81 MMHG | WEIGHT: 260 LBS | BODY MASS INDEX: 43.32 KG/M2 | SYSTOLIC BLOOD PRESSURE: 129 MMHG | HEIGHT: 65 IN | HEART RATE: 116 BPM

## 2024-01-25 DIAGNOSIS — M65.4 DE QUERVAIN'S TENOSYNOVITIS, RIGHT: ICD-10-CM

## 2024-01-25 DIAGNOSIS — Z86.711 HISTORY OF PULMONARY EMBOLISM: ICD-10-CM

## 2024-01-25 DIAGNOSIS — M75.102 LEFT ROTATOR CUFF TEAR ARTHROPATHY: Primary | ICD-10-CM

## 2024-01-25 DIAGNOSIS — E66.01 MORBID OBESITY: ICD-10-CM

## 2024-01-25 DIAGNOSIS — M12.812 LEFT ROTATOR CUFF TEAR ARTHROPATHY: Primary | ICD-10-CM

## 2024-01-25 PROCEDURE — 99214 OFFICE O/P EST MOD 30 MIN: CPT | Mod: 25,,, | Performed by: ORTHOPAEDIC SURGERY

## 2024-01-25 PROCEDURE — 20610 DRAIN/INJ JOINT/BURSA W/O US: CPT | Mod: LT,,, | Performed by: ORTHOPAEDIC SURGERY

## 2024-01-25 RX ORDER — ALLOPURINOL 300 MG/1
300 TABLET ORAL
COMMUNITY
Start: 2023-12-14

## 2024-01-25 RX ADMIN — METHYLPREDNISOLONE ACETATE 40 MG: 80 INJECTION, SUSPENSION INTRA-ARTICULAR; INTRALESIONAL; INTRAMUSCULAR; SOFT TISSUE at 02:01

## 2024-01-25 RX ADMIN — LIDOCAINE HYDROCHLORIDE 2 ML: 20 INJECTION, SOLUTION INFILTRATION; PERINEURAL at 02:01

## 2024-01-25 NOTE — PROGRESS NOTES
[facility]  Orthopedic Clinic Note      Chief Complaint:   Chief Complaint   Patient presents with    Follow-up     3mo f/u left shoulder steroid injection 10/19/23. Pt states the injection gave her a lot of relief. Requesting another today. States just now wearing off      Referring Physician: No ref. provider found      History of Present Illness:    This is a 83 y.o. year old female presenting with complaints of left shoulder pain worsening over the last two years.  This was exacerbated by a fall that occurred on 10/12/2023.  She was evaluated in the ED Patient has noticed progressive decrease in range of motion and weakness.  Patient states pain is moderate to severe.  Patient complains of night pain.  Patient been treated with over the counter medications with no improvement.  She also complains of left ankle pain since the same fall that occurred on 10/12/2023.  She states that she was standing on tiptoes when she felt a popping sensation in her foot.  She was evaluated in the ED following this injury where x-rays were performed.  X-rays demonstrated no acute osseous pathology.  She is ambulating unassisted.  01/25/2024 patient presents for follow-up on left shoulder pain.  She received a left subacromial corticosteroid injection at her prior visit she reports great relief in symptoms following this injection and that her symptoms have just begun to return.  She would like to repeat the injection today if possible.  She also complains of right wrist pain for the last few months.  She reports pain over the radial aspect of the wrist that worsens with moving the thumb and forming a fist.      Past Medical History:   Diagnosis Date    Anticoagulant long-term use     Arthritis     Asthma     DVT (deep vein thrombosis) in pregnancy     GERD (gastroesophageal reflux disease)     Hypertension     PAD (peripheral artery disease)     Post-menopausal bleeding     Pulmonary embolus     Thyroid disease     HYPO        Past Surgical History:   Procedure Laterality Date    APPENDECTOMY      btl      CATARACT EXTRACTION Bilateral     CHOLECYSTECTOMY      DILATION AND CURETTAGE OF UTERUS      EXAMINATION UNDER ANESTHESIA N/A 12/20/2022    Procedure: EXAM UNDER ANESTHESIA;  Surgeon: Vargas Frias MD;  Location: [facility];  Service: OB/GYN;  Laterality: N/A;    EYE SURGERY      ivc filter      RE-EXPLORATION FOR BLEEDING N/A 12/20/2022    Procedure: RE-EXPLORATION, FOR BLEEDING;  Surgeon: Vargas Frias MD;  Location: [facility] OR;  Service: OB/GYN;  Laterality: N/A;    REPAIR OF VAGINAL CUFF  12/20/2022    Procedure: REPAIR, VAGINAL CUFF;  Surgeon: Vargas Frias MD;  Location: [facility] OR;  Service: OB/GYN;;    ROBOT-ASSISTED LAPAROSCOPIC ABDOMINAL HYSTERECTOMY USING DA JESSICA XI N/A 11/15/2022    Procedure: XI ROBOTIC HYSTERECTOMY;  Surgeon: Hugo Bradford MD;  Location: [facility] [place] OR;  Service: OB/GYN;  Laterality: N/A;    ROBOT-ASSISTED LAPAROSCOPIC SALPINGO-OOPHORECTOMY USING DA JESSICA XI Bilateral 11/15/2022    Procedure: XI ROBOTIC SALPINGO-OOPHORECTOMY;  Surgeon: Hugo Bradford MD;  [facility];  Service: OB/GYN;  Laterality: Bilateral;    SHOULDER SURGERY Right 2017       Current Outpatient Medications   Medication Sig    acetaminophen (TYLENOL) 650 MG TbSR Take 1 tablet (650 mg total) by mouth every 8 (eight) hours as needed (pain).    allopurinoL (ZYLOPRIM) 300 MG tablet Take 300 mg by mouth.    b complex vitamins tablet Take 1 tablet by mouth once daily.    cetirizine (ZYRTEC) 10 MG tablet Take 10 mg by mouth once daily.    cholecalciferol, vitamin D3, 1,250 mcg (50,000 unit) capsule Take 50,000 Units by mouth every 7 days.    clopidogreL (PLAVIX) 75 mg tablet Take 75 mg by mouth once daily.    EUTHYROX 75 mcg tablet Take 75 mcg by mouth once daily.    folic acid (FOLVITE) 1 MG tablet Take 1,000 mcg by mouth once daily.    furosemide (LASIX) 20 MG tablet Take 20 mg by mouth 2 (two) times daily.     hydroCHLOROthiazide (HYDRODIURIL) 25 MG tablet Take 25 mg by mouth once daily.    losartan (COZAAR) 50 MG tablet Take 50 mg by mouth once daily.    magnesium oxide-Mg AA chelate (MG-PLUS-PROTEIN) 133 mg Tab Take by mouth.    multivitamin capsule Take 1 capsule by mouth once daily.    omega-3 fatty acids/fish oil (FISH OIL-OMEGA-3 FATTY ACIDS) 300-1,000 mg capsule Take 1 capsule by mouth once daily.    ondansetron (ZOFRAN-ODT) 4 MG TbDL Take 4 mg by mouth every 8 (eight) hours as needed.    pantoprazole (PROTONIX) 40 MG tablet Take 40 mg by mouth 2 (two) times daily.    potassium chloride (K-TAB) 20 mEq Take 20 mEq by mouth once daily.    melatonin 1 mg Chew Take 1 tablet by mouth. 5MG     No current facility-administered medications for this visit.       Review of patient's allergies indicates:   Allergen Reactions    Cefazolin Rash     Other reaction(s): BLISTERS    Iodine Nausea And Vomiting and Rash      / CAN NOT EAT SHELLFISH CAN NOT USE BETADINE    Ibuprofen Rash     Almost all NSAIDS - CAN TAKE TYLENOL AND TORADOL    Aspirin      Other reaction(s): Rectal bleeding, Stomach pain    Chocolate flavor     Propoxyphene Hallucinations    Sulfa (sulfonamide antibiotics)        Family History   Problem Relation Age of Onset    No Known Problems Mother     No Known Problems Father        Social History     Socioeconomic History    Marital status:    Tobacco Use    Smoking status: Never    Smokeless tobacco: Never   Substance and Sexual Activity    Alcohol use: Not Currently    Drug use: Never    Sexual activity: Not Currently     Partners: Male     Social Determinants of Health     Financial Resource Strain: Low Risk  (11/16/2022)    Overall Financial Resource Strain (CARDIA)     Difficulty of Paying Living Expenses: Not hard at all   Food Insecurity: No Food Insecurity (11/16/2022)    Hunger Vital Sign     Worried About Running Out of Food in the Last Year: Never true     Ran Out of Food in the Last Year:  "Never true   Transportation Needs: No Transportation Needs (11/16/2022)    PRAPARE - Transportation     Lack of Transportation (Medical): No     Lack of Transportation (Non-Medical): No   Physical Activity: Inactive (11/16/2022)    Exercise Vital Sign     Days of Exercise per Week: 0 days     Minutes of Exercise per Session: 0 min   Stress: No Stress Concern Present (11/16/2022)    [employer] - Occupational Stress Questionnaire     Feeling of Stress : Only a little   Social Connections: Moderately Integrated (11/16/2022)    Social Connection and Isolation Panel [NHANES]     Frequency of Communication with Friends and Family: More than three times a week     Frequency of Social Gatherings with Friends and Family: More than three times a week     Attends Temple Services: More than 4 times per year     Active Member of Clubs or Organizations: No     Attends Club or Organization Meetings: Never     Marital Status:    Housing Stability: Low Risk  (11/16/2022)    Housing Stability Vital Sign     Unable to Pay for Housing in the Last Year: No     Number of Places Lived in the Last Year: 1     Unstable Housing in the Last Year: No           Review of Systems:  All review of systems negative except for those stated in the HPI.    Examination:    Vital Signs:    Vitals:    01/25/24 1439   BP: 129/81   Pulse: (!) 116   Weight: 117.9 kg (260 lb)   Height: 5' 5" (1.651 m)       Body mass index is 43.27 kg/m².    Physical Examination:  General: Well-developed, well-nourished.  Neuro: Alert and oriented x 3.  Psych: Normal mood and affect.  Card: Regular rate and rhythm  Resp: Respirations regular and unlabored  Right Wrist Exam:  No obvious deformity. Negative tenderness over distal radius. Supination and pronation to 90 degrees and 90 degrees, respectively. Wrist flexion to 90 degrees and wrist extension to 70 degree. Negative flexion-compression test and Phanel´s test.  Positive " Finkelstein´s test. 4/5 strength, normal skin appearance and palpable pulses and CR<2.  Left Shoulder Exam:  No obvious deformity. No medial or lateral scapula winging.  Forward flexion to 120 degrees.  Abduction to 120 degrees.  External and internal rotation to 50 degrees.  4/5 strength, normal skin appearance and palpable pulses distally. Sensibility normal.      Imaging:  Prior three views of the left shoulder demonstrated advanced degenerative changes with bone-on-bone articulation of the glenohumeral joint.  There is also acetabularization of the acromion and osteophyte formation.  Prior three views of the left ankle demonstrate no acute osseous pathology.  There is some evidence of diffuse degenerative changes.    Assessment: Left rotator cuff tear arthropathy  -     Large Joint Aspiration/Injection: L subacromial bursa    De Quervain's tenosynovitis, right    History of pulmonary embolism    Morbid obesity        Plan:  In regards to her right wrist, plan to refer her to orthopedic hand specialist, Sami Payton MD, for further evaluation recommendations.  In regards to her shoulder, plan to proceed with repeat subacromial corticosteroid injection today.  We also discussed that due to the severity of her degenerative changes, surgical intervention via reverse total shoulder arthroplasty is likely the only definitive treatment for her symptoms.  However, we will definitely need to obtain preoperative clearances.  She will continue over-the-counter medications as needed for pain.  Avoid anti-inflammatories secondary to chronic anticoagulation with Plavix.  She will return to clinic in approximately 3 months for re-evaluation.  She verbalized understanding of the plan of care with no further questions.    Fili Jones MD personally performed the services described in this documentation, including but not limited to patient's history, physical examination, and assessment and plan of care. All medical record entries  made by YASEMIN Mcfarlane were performed at his direction and in his presence. The medical record was reviewed and is accurate and complete.    Large Joint Aspiration/Injection: L subacromial bursa    Date/Time: 1/25/2024 2:15 PM    Performed by: Fili Jones MD  Authorized by: Fili Jones MD    Consent Done?:  Yes (Verbal)  Indications:  Pain and arthritis  Prep: patient was prepped and draped in usual sterile fashion    Approach:  Posterior  Location:  Shoulder  Site:  L subacromial bursa  Medications:  2 mL LIDOcaine HCL 20 mg/ml (2%) 20 mg/mL (2 %); 40 mg methylPREDNISolone acetate 80 mg/mL  Patient tolerance:  Patient tolerated the procedure well with no immediate complications      Follow up in about 3 months (around 4/25/2024) for Reevaluation.      DISCLAIMER: This note may have been dictated using voice recognition software and may contain grammatical errors.     NOTE: Consult report sent to referring provider via [employer] EMR.

## 2024-01-25 NOTE — PROCEDURES
Large Joint Aspiration/Injection: L subacromial bursa    Date/Time: 1/25/2024 2:15 PM    Performed by: Fili Jones MD  Authorized by: Fili Jones MD    Consent Done?:  Yes (Verbal)  Indications:  Pain and arthritis  Prep: patient was prepped and draped in usual sterile fashion    Approach:  Posterior  Location:  Shoulder  Site:  L subacromial bursa  Medications:  2 mL LIDOcaine HCL 20 mg/ml (2%) 20 mg/mL (2 %); 40 mg methylPREDNISolone acetate 80 mg/mL  Patient tolerance:  Patient tolerated the procedure well with no immediate complications

## 2024-01-29 RX ORDER — LIDOCAINE HYDROCHLORIDE 20 MG/ML
2 INJECTION, SOLUTION INFILTRATION; PERINEURAL
Status: DISCONTINUED | OUTPATIENT
Start: 2024-01-25 | End: 2024-01-29 | Stop reason: HOSPADM

## 2024-01-29 RX ORDER — METHYLPREDNISOLONE ACETATE 80 MG/ML
40 INJECTION, SUSPENSION INTRA-ARTICULAR; INTRALESIONAL; INTRAMUSCULAR; SOFT TISSUE
Status: DISCONTINUED | OUTPATIENT
Start: 2024-01-25 | End: 2024-01-29 | Stop reason: HOSPADM

## 2024-01-31 ENCOUNTER — HOSPITAL ENCOUNTER (OUTPATIENT)
Dept: RADIOLOGY | Facility: CLINIC | Age: 84
Discharge: HOME | End: 2024-01-31
Attending: STUDENT IN AN ORGANIZED HEALTH CARE EDUCATION/TRAINING PROGRAM
Payer: MEDICARE

## 2024-01-31 ENCOUNTER — OFFICE VISIT (OUTPATIENT)
Dept: ORTHOPEDICS | Facility: CLINIC | Age: 84
End: 2024-01-31
Payer: MEDICARE

## 2024-01-31 VITALS
TEMPERATURE: 98 F | DIASTOLIC BLOOD PRESSURE: 83 MMHG | SYSTOLIC BLOOD PRESSURE: 134 MMHG | HEART RATE: 91 BPM | WEIGHT: 268.19 LBS | HEIGHT: 65 IN | BODY MASS INDEX: 44.68 KG/M2

## 2024-01-31 DIAGNOSIS — M65.4 DE QUERVAIN'S TENOSYNOVITIS, RIGHT: ICD-10-CM

## 2024-01-31 DIAGNOSIS — M65.4 DE QUERVAIN'S TENOSYNOVITIS, RIGHT: Primary | ICD-10-CM

## 2024-01-31 PROCEDURE — 99203 OFFICE O/P NEW LOW 30 MIN: CPT | Mod: 25,,, | Performed by: STUDENT IN AN ORGANIZED HEALTH CARE EDUCATION/TRAINING PROGRAM

## 2024-01-31 PROCEDURE — 73110 X-RAY EXAM OF WRIST: CPT | Mod: RT,,, | Performed by: STUDENT IN AN ORGANIZED HEALTH CARE EDUCATION/TRAINING PROGRAM

## 2024-01-31 PROCEDURE — 20550 NJX 1 TENDON SHEATH/LIGAMENT: CPT | Mod: RT,,, | Performed by: STUDENT IN AN ORGANIZED HEALTH CARE EDUCATION/TRAINING PROGRAM

## 2024-01-31 RX ORDER — BETAMETHASONE SODIUM PHOSPHATE AND BETAMETHASONE ACETATE 3; 3 MG/ML; MG/ML
6 INJECTION, SUSPENSION INTRA-ARTICULAR; INTRALESIONAL; INTRAMUSCULAR; SOFT TISSUE
Status: DISCONTINUED | OUTPATIENT
Start: 2024-01-31 | End: 2024-01-31 | Stop reason: HOSPADM

## 2024-01-31 RX ORDER — LIDOCAINE HYDROCHLORIDE 10 MG/ML
1 INJECTION INFILTRATION; PERINEURAL
Status: DISCONTINUED | OUTPATIENT
Start: 2024-01-31 | End: 2024-01-31 | Stop reason: HOSPADM

## 2024-01-31 RX ORDER — COLCHICINE 0.6 MG/1
0.6 TABLET ORAL
COMMUNITY
Start: 2023-12-14

## 2024-01-31 RX ORDER — OXYBUTYNIN CHLORIDE 10 MG/1
10 TABLET, EXTENDED RELEASE ORAL
COMMUNITY
Start: 2023-12-14

## 2024-01-31 RX ADMIN — LIDOCAINE HYDROCHLORIDE 1 ML: 10 INJECTION INFILTRATION; PERINEURAL at 03:01

## 2024-01-31 RX ADMIN — BETAMETHASONE SODIUM PHOSPHATE AND BETAMETHASONE ACETATE 6 MG: 3; 3 INJECTION, SUSPENSION INTRA-ARTICULAR; INTRALESIONAL; INTRAMUSCULAR; SOFT TISSUE at 03:01

## 2024-01-31 NOTE — PROCEDURES
Tendon Sheath    Date/Time: 1/31/2024 3:30 PM    Performed by: Sami Payton MD  Authorized by: Sami Payton MD    Consent Done?:  Yes (Verbal)  Indications:  Pain  Timeout: prior to procedure the correct patient, procedure, and site was verified    Location:  Wrist  Site:  R first doral compartment  Needle size:  25 G  Approach:  Radial  Medications:  1 mL LIDOcaine HCL 10 mg/ml (1%) 10 mg/mL (1 %); 6 mg betamethasone acetate-betamethasone sodium phosphate 6 mg/mL  Patient tolerance:  Patient tolerated the procedure well with no immediate complications

## 2024-01-31 NOTE — PROGRESS NOTES
Orthopedic Clinic - Hand    Chief Complaint: Right hand pain      History of Present Illness: Adriana Murphy is a 83 y.o. female here today for right thumb pain. Patient was referred by another physician in the clinic. She states that she has been experiencing pain for about 3-4 months. She says that the pain travels up her wrist and is worse with pressure. She wears a brace at night but reports that it does not provide much relief.      Past Medical History:   Diagnosis Date    Anticoagulant long-term use     Arthritis     Asthma     DVT (deep vein thrombosis) in pregnancy     GERD (gastroesophageal reflux disease)     Hypertension     PAD (peripheral artery disease)     Post-menopausal bleeding     Pulmonary embolus     Thyroid disease     HYPO        Past Surgical History:   Procedure Laterality Date    APPENDECTOMY      btl      CATARACT EXTRACTION Bilateral     CHOLECYSTECTOMY      DILATION AND CURETTAGE OF UTERUS      EXAMINATION UNDER ANESTHESIA N/A 12/20/2022    Procedure: EXAM UNDER ANESTHESIA;  Surgeon: Vargas Frias MD;  Location: [facility];  Service: OB/GYN;  Laterality: N/A;    EYE SURGERY      HYSTERECTOMY  11-    ivc filter      RE-EXPLORATION FOR BLEEDING N/A 12/20/2022    Procedure: RE-EXPLORATION, FOR BLEEDING;  Surgeon: Vargas Frias MD;  Location: [facility] OR;  Service: OB/GYN;  Laterality: N/A;    REPAIR OF VAGINAL CUFF  12/20/2022    Procedure: REPAIR, VAGINAL CUFF;  Surgeon: Vargas Frias MD;  Location: [facility] OR;  Service: OB/GYN;;    ROBOT-ASSISTED LAPAROSCOPIC ABDOMINAL HYSTERECTOMY USING DA JESSICA XI N/A 11/15/2022    Procedure: XI ROBOTIC HYSTERECTOMY;  Surgeon: Hugo Bradford MD;  Location: [facility] OR;  Service: OB/GYN;  Laterality: N/A;    ROBOT-ASSISTED LAPAROSCOPIC SALPINGO-OOPHORECTOMY USING DA JESSICA XI Bilateral 11/15/2022    Procedure: XI ROBOTIC SALPINGO-OOPHORECTOMY;  Surgeon: Hugo Bradford MD;  Location: [facility] OR;  Service: OB/GYN;  Laterality: Bilateral;     SHOULDER SURGERY Right 2017    TUBAL LIGATION  2/24/1979        Social History     Tobacco Use    Smoking status: Never    Smokeless tobacco: Never    Tobacco comments:     Experimented in 20's   Substance and Sexual Activity    Alcohol use: Never    Drug use: Never    Sexual activity: Not Currently     Partners: Male     Birth control/protection: I.U.D., Spermicide        Current Outpatient Medications   Medication Instructions    acetaminophen (TYLENOL) 650 mg, Oral, Every 8 hours PRN    allopurinoL (ZYLOPRIM) 300 mg, Oral    b complex vitamins tablet 1 tablet, Oral, Daily    cetirizine (ZYRTEC) 10 mg, Oral, Daily    cholecalciferol (vitamin D3) 50,000 Units, Oral, Every 7 days    clopidogreL (PLAVIX) 75 mg, Oral, Daily    colchicine (COLCRYS) 0.6 mg, Oral    EUTHYROX 75 mcg, Oral, Daily    folic acid (FOLVITE) 1,000 mcg, Oral, Daily    furosemide (LASIX) 20 mg, Oral, 2 times daily    hydroCHLOROthiazide (HYDRODIURIL) 25 mg, Oral, Daily    losartan (COZAAR) 50 mg, Oral, Daily    magnesium oxide-Mg AA chelate (MG-PLUS-PROTEIN) 133 mg Tab Oral    multivitamin capsule 1 capsule, Oral, Daily    omega-3 fatty acids/fish oil (FISH OIL-OMEGA-3 FATTY ACIDS) 300-1,000 mg capsule 1 capsule, Oral, Daily    ondansetron (ZOFRAN-ODT) 4 mg, Oral, Every 8 hours PRN    oxybutynin (DITROPAN-XL) 10 mg, Oral    pantoprazole (PROTONIX) 40 mg, Oral, 2 times daily    potassium chloride (K-TAB) 20 mEq 20 mEq, Oral, Daily       Review of patient's allergies indicates:   Allergen Reactions    Cefazolin Rash     Other reaction(s): BLISTERS    Iodine Nausea And Vomiting and Rash      / CAN NOT EAT SHELLFISH CAN NOT USE BETADINE    Ibuprofen Rash     Almost all NSAIDS - CAN TAKE TYLENOL AND TORADOL    Adhesive tape-silicones      Other Reaction(s): pulls skin    paper tape allergy    Aspirin      Other reaction(s): Rectal bleeding, Stomach pain    Chocolate flavor     Propoxyphene Hallucinations    Shellfish containing products      "Statins-hmg-coa reductase inhibitors      Other Reaction(s): body aches, stomach pain    Sulfa (sulfonamide antibiotics)         Patient Care Team:  No, Primary Doctor as PCP - General     Review of Systems:    Constitution:   Denies chills, fever, and sweats.  HENT:   Denies headaches or blurry vision.  Cardiovascular:   Denies chest pain or irregular heart beat.  Respiratory:   Denies cough or shortness of breath.  Gastrointestinal:  Denies abdominal pain, nausea, or vomiting.  Musculoskeletal:   Denies muscle cramps.  Neurological:   Denies dizziness or focal weakness.  Psychiatric/Behavior: Normal mental status.  Hematology/Lymph:  Denies bleeding problem or easy bruising/bleeding.  Skin:    Denies rash or suspicious lesions.    Physical Examination:    Vital Signs:    Vitals:    01/31/24 1551 01/31/24 1552   BP: (!) 148/84 134/83   Pulse: 92 91   Temp: 98.1 °F (36.7 °C)    Weight: 121.7 kg (268 lb 3.2 oz)    Height: 5' 5" (1.651 m)    Body mass index is 44.63 kg/m².    Constitution:   Well-developed, well nourished patient in no acute distress.  Neurological:   Alert and oriented x 3 and cooperative to examination.     Psychiatric/Behavior: Normal mental status.  Respiratory:   No shortness of breath. Non-labored breathing.  Eyes:    Extraoccular muscles intact.    Musculoskeletal Examination:   Right Upper Extremity:    [x] No open wounds or rashes.    [] Abnormal skin findings:  [x] Full ROM of the wrist, hand and digits.    [] Limited ROM of the wrist, hand, and digits:   [x] Radial pulses 2+ is warm well perfused.      Tenderness to palpation at thumb carpometacarpal joint  Positive finkelstein's test      Imaging:   X-rays of the right hand three views shows thumb carpometacarpal joint osteoarthritis      Assessment:  Right thumb CMC osteoarthritis, DeQuervain's tenosynovitis    Plan:  Think her biggest issue is the de Quervain tenosynovitis.  I will give her a thumb spica brace today as well as a referral " to occupational hand therapy for phono and iontophoresis.  I will give her an injection into the 1st dorsal compartment today as well.  She can follow up with me as needed if she has any issues in her pain continues despite these treatment modalities    Follow Up:  As needed    X-Ray at Next Visit:  None    Sami Payton MD personally performed the services described in this documentation, including but not limited to patient's history, physical examination, and assessment and plan of care. All medical record entries made by Eli Choudhary PA-C were performed at his direction and in his presence. The medical record was reviewed and is accurate and complete.

## 2024-03-12 ENCOUNTER — HOSPITAL ENCOUNTER (OUTPATIENT)
Dept: RADIOLOGY | Facility: HOSPITAL | Age: 84
Discharge: HOME | End: 2024-03-12
Attending: OBSTETRICS & GYNECOLOGY
Payer: MEDICARE

## 2024-03-12 DIAGNOSIS — Z12.31 ENCOUNTER FOR SCREENING MAMMOGRAM FOR MALIGNANT NEOPLASM OF BREAST: ICD-10-CM

## 2024-03-12 PROCEDURE — 77067 SCR MAMMO BI INCL CAD: CPT | Mod: 26,,, | Performed by: STUDENT IN AN ORGANIZED HEALTH CARE EDUCATION/TRAINING PROGRAM

## 2024-03-12 PROCEDURE — 77067 SCR MAMMO BI INCL CAD: CPT | Mod: TC

## 2024-03-12 PROCEDURE — 77063 BREAST TOMOSYNTHESIS BI: CPT | Mod: 26,,, | Performed by: STUDENT IN AN ORGANIZED HEALTH CARE EDUCATION/TRAINING PROGRAM

## 2024-03-13 ENCOUNTER — TELEPHONE (OUTPATIENT)
Dept: ORTHOPEDICS | Facility: CLINIC | Age: 84
End: 2024-03-13
Payer: MEDICARE

## 2024-03-13 NOTE — TELEPHONE ENCOUNTER
Patient is unable to do OT outpatient since she is currently on home health.     I entered an order for OT with home health.   I faxed the order to [facility]

## 2024-03-20 NOTE — TELEPHONE ENCOUNTER
Patient called stating that her home health agency does not have the equipment needed to offer her OT with Home Health.     Patient states that she canceled her Home Health and would like to do outpatient OT. Patient agreed to [facility].     I called [facility] and informed them of what is stated above. They will call the patient to schedule.

## 2024-04-05 DIAGNOSIS — M65.4 DE QUERVAIN'S TENOSYNOVITIS, RIGHT: Primary | ICD-10-CM

## 2024-04-30 ENCOUNTER — OFFICE VISIT (OUTPATIENT)
Dept: ORTHOPEDICS | Facility: CLINIC | Age: 84
End: 2024-04-30
Payer: MEDICARE

## 2024-04-30 VITALS
WEIGHT: 268.31 LBS | SYSTOLIC BLOOD PRESSURE: 128 MMHG | BODY MASS INDEX: 44.7 KG/M2 | HEART RATE: 83 BPM | DIASTOLIC BLOOD PRESSURE: 80 MMHG | HEIGHT: 65 IN

## 2024-04-30 DIAGNOSIS — E66.01 MORBID OBESITY: ICD-10-CM

## 2024-04-30 DIAGNOSIS — M12.812 LEFT ROTATOR CUFF TEAR ARTHROPATHY: Primary | ICD-10-CM

## 2024-04-30 DIAGNOSIS — M75.102 LEFT ROTATOR CUFF TEAR ARTHROPATHY: Primary | ICD-10-CM

## 2024-04-30 DIAGNOSIS — Z86.711 HISTORY OF PULMONARY EMBOLISM: ICD-10-CM

## 2024-04-30 PROCEDURE — 99213 OFFICE O/P EST LOW 20 MIN: CPT | Mod: ,,, | Performed by: ORTHOPAEDIC SURGERY

## 2024-04-30 NOTE — PROGRESS NOTES
Orthopaedic Clinic  Orthopedic Clinic Note      Chief Complaint:   Chief Complaint   Patient presents with    Left Shoulder - Follow-up, Injections      3 month f/u left shoulder injection 1/25/24 with relief and still working     Referring Physician: No ref. provider found      History of Present Illness:    This is a 83 y.o. year old female presenting with complaints of left shoulder pain worsening over the last two years.  This was exacerbated by a fall that occurred on 10/12/2023.  She was evaluated in the ED Patient has noticed progressive decrease in range of motion and weakness.  Patient states pain is moderate to severe.  Patient complains of night pain.  Patient been treated with over the counter medications with no improvement.  She also complains of left ankle pain since the same fall that occurred on 10/12/2023.  She states that she was standing on tiptoes when she felt a popping sensation in her foot.  She was evaluated in the ED following this injury where x-rays were performed.  X-rays demonstrated no acute osseous pathology.  She is ambulating unassisted.  01/25/2024 patient presents for follow-up on left shoulder pain.  She received a left subacromial corticosteroid injection at her prior visit she reports great relief in symptoms following this injection and that her symptoms have just begun to return.  She would like to repeat the injection today if possible.  She also complains of right wrist pain for the last few months.  She reports pain over the radial aspect of the wrist that worsens with moving the thumb and forming a fist.  04/30/2024 for follow-up on left shoulder pain.  She received a left subacromial corticosteroid injection at her prior visit.  She reports this is his extremely helpful and is still providing her with relief of symptoms.      Past Medical History:   Diagnosis Date    Anticoagulant long-term use     Arthritis     Asthma     DVT (deep vein thrombosis) in pregnancy      GERD (gastroesophageal reflux disease)     Hypertension     PAD (peripheral artery disease)     Post-menopausal bleeding     Pulmonary embolus     Thyroid disease     HYPO       Past Surgical History:   Procedure Laterality Date    APPENDECTOMY      btl      CATARACT EXTRACTION Bilateral     CHOLECYSTECTOMY      DILATION AND CURETTAGE OF UTERUS      EXAMINATION UNDER ANESTHESIA N/A 12/20/2022    Procedure: EXAM UNDER ANESTHESIA;  Surgeon: Vargas Frias MD;  Location: [facility];  Service: OB/GYN;  Laterality: N/A;    EYE SURGERY      HYSTERECTOMY  11-    ivc filter      RE-EXPLORATION FOR BLEEDING N/A 12/20/2022    Procedure: RE-EXPLORATION, FOR BLEEDING;  Surgeon: Vargas Frias MD;  Location: [facility] OR;  Service: OB/GYN;  Laterality: N/A;    REPAIR OF VAGINAL CUFF  12/20/2022    Procedure: REPAIR, VAGINAL CUFF;  Surgeon: Vargas Frias MD;  Location: [facility] OR;  Service: OB/GYN;;    ROBOT-ASSISTED LAPAROSCOPIC ABDOMINAL HYSTERECTOMY USING DA JESSICA XI N/A 11/15/2022    Procedure: XI ROBOTIC HYSTERECTOMY;  Surgeon: Hugo Bradford MD;  Location: [facility] OR;  Service: OB/GYN;  Laterality: N/A;    ROBOT-ASSISTED LAPAROSCOPIC SALPINGO-OOPHORECTOMY USING DA JESSICA XI Bilateral 11/15/2022    Procedure: XI ROBOTIC SALPINGO-OOPHORECTOMY;  Surgeon: Hugo Bradford MD;  Location: [facility];  Service: OB/GYN;  Laterality: Bilateral;    SHOULDER SURGERY Right 2017    TUBAL LIGATION  2/24/1979       Current Outpatient Medications   Medication Sig Dispense Refill    acetaminophen (TYLENOL) 650 MG TbSR Take 1 tablet (650 mg total) by mouth every 8 (eight) hours as needed (pain). 60 tablet 0    allopurinoL (ZYLOPRIM) 300 MG tablet Take 300 mg by mouth.      b complex vitamins tablet Take 1 tablet by mouth once daily.      cetirizine (ZYRTEC) 10 MG tablet Take 10 mg by mouth once daily.      cholecalciferol, vitamin D3, 1,250 mcg (50,000 unit) capsule Take 50,000 Units by mouth every 7 days.      clopidogreL (PLAVIX) 75 mg  tablet Take 75 mg by mouth once daily.      colchicine (COLCRYS) 0.6 mg tablet Take 0.6 mg by mouth.      EUTHYROX 75 mcg tablet Take 75 mcg by mouth once daily.      folic acid (FOLVITE) 1 MG tablet Take 1,000 mcg by mouth once daily.      furosemide (LASIX) 20 MG tablet Take 20 mg by mouth 2 (two) times daily.      hydroCHLOROthiazide (HYDRODIURIL) 25 MG tablet Take 25 mg by mouth once daily.      losartan (COZAAR) 50 MG tablet Take 50 mg by mouth once daily.      magnesium oxide-Mg AA chelate (MG-PLUS-PROTEIN) 133 mg Tab Take by mouth.      multivitamin capsule Take 1 capsule by mouth once daily.      omega-3 fatty acids/fish oil (FISH OIL-OMEGA-3 FATTY ACIDS) 300-1,000 mg capsule Take 1 capsule by mouth once daily.      ondansetron (ZOFRAN-ODT) 4 MG TbDL Take 4 mg by mouth every 8 (eight) hours as needed.      oxybutynin (DITROPAN-XL) 10 MG 24 hr tablet Take 10 mg by mouth.      pantoprazole (PROTONIX) 40 MG tablet Take 40 mg by mouth 2 (two) times daily.      potassium chloride (K-TAB) 20 mEq Take 20 mEq by mouth once daily.       No current facility-administered medications for this visit.       Review of patient's allergies indicates:   Allergen Reactions    Cefazolin Rash     Other reaction(s): BLISTERS    Iodine Nausea And Vomiting and Rash      / CAN NOT EAT SHELLFISH CAN NOT USE BETADINE    Ibuprofen Rash     Almost all NSAIDS - CAN TAKE TYLENOL AND TORADOL    Adhesive tape-silicones      Other Reaction(s): pulls skin    paper tape allergy    Aspirin      Other reaction(s): Rectal bleeding, Stomach pain    Chocolate flavor     Propoxyphene Hallucinations    Shellfish containing products     Statins-hmg-coa reductase inhibitors      Other Reaction(s): body aches, stomach pain    Sulfa (sulfonamide antibiotics)        Family History   Problem Relation Name Age of Onset    Arthritis Mother Ema Kenny     Cancer Father Eliseo' and mother Ema     Diabetes Father Eliseo' and mother Ema      "Hypertension Father Marciano and mother Ema     Alcohol abuse Brother Vargas        Social History     Socioeconomic History    Marital status:    Tobacco Use    Smoking status: Never    Smokeless tobacco: Never    Tobacco comments:     Experimented in 20's   Substance and Sexual Activity    Alcohol use: Never    Drug use: Never    Sexual activity: Not Currently     Partners: Male     Birth control/protection: I.U.D., Spermicide     Social Determinants of Health     Financial Resource Strain: Low Risk  (11/16/2022)    Overall Financial Resource Strain (CARDIA)     Difficulty of Paying Living Expenses: Not hard at all   Food Insecurity: No Food Insecurity (11/16/2022)    Hunger Vital Sign     Worried About Running Out of Food in the Last Year: Never true     Ran Out of Food in the Last Year: Never true   Transportation Needs: No Transportation Needs (11/16/2022)    PRAPARE - Transportation     Lack of Transportation (Medical): No     Lack of Transportation (Non-Medical): No   Physical Activity: Inactive (11/16/2022)    Exercise Vital Sign     Days of Exercise per Week: 0 days     Minutes of Exercise per Session: 0 min   Stress: No Stress Concern Present (11/16/2022)    [employer] [place] [employer] - Occupational Stress Questionnaire     Feeling of Stress : Only a little   Housing Stability: Low Risk  (11/16/2022)    Housing Stability Vital Sign     Unable to Pay for Housing in the Last Year: No     Number of Places Lived in the Last Year: 1     Unstable Housing in the Last Year: No           Review of Systems:  All review of systems negative except for those stated in the HPI.    Examination:    Vital Signs:    Vitals:    04/30/24 1455 04/30/24 1457   BP: (!) 177/97 128/80   Pulse: 83 (P) 76   Weight: 121.7 kg (268 lb 4.8 oz)    Height: 5' 5" (1.651 m)        Body mass index is 44.65 kg/m².    Physical Examination:  General: Well-developed, well-nourished.  Neuro: Alert and oriented x " 3.  Psych: Normal mood and affect.  Card: Regular rate and rhythm  Resp: Respirations regular and unlabored  Left Shoulder Exam:  No obvious deformity. No medial or lateral scapula winging.  Forward flexion to 120 degrees.  Abduction to 120 degrees.  External and internal rotation to 50 degrees.  4/5 strength, normal skin appearance and palpable pulses distally. Sensibility normal.      Imaging:  Three views of the left shoulder demonstrated advanced degenerative changes with bone-on-bone articulation of the glenohumeral joint.  There is also acetabularization of the acromion and osteophyte formation.    Assessment: Left rotator cuff tear arthropathy    History of pulmonary embolism    Morbid obesity        Plan:  We will continue to monitor her symptoms for now.  Reiterated that due to the severity of her degenerative changes, surgical intervention via reverse total shoulder arthroplasty is likely the only definitive treatment for her symptoms.  However, we will definitely need to obtain preoperative clearances.  She will continue over-the-counter medications as needed for pain.  Avoid anti-inflammatories secondary to chronic anticoagulation with Plavix.  She will return to clinic as needed for any additional issues or concerns, or if her symptoms should recur.  She verbalized understanding of the plan of care with no further questions.    Fili Jones MD personally performed the services described in this documentation, including but not limited to patient's history, physical examination, and assessment and plan of care. All medical record entries made by YASEMIN Mcfarlane were performed at his direction and in his presence. The medical record was reviewed and is accurate and complete.         Follow up if symptoms worsen or fail to improve.      DISCLAIMER: This note may have been dictated using voice recognition software and may contain grammatical errors.     NOTE: Consult report sent to referring provider  via [employer] EMR.

## 2024-05-07 ENCOUNTER — OFFICE VISIT (OUTPATIENT)
Dept: GYNECOLOGIC ONCOLOGY | Facility: CLINIC | Age: 84
End: 2024-05-07
Payer: MEDICARE

## 2024-05-07 VITALS
DIASTOLIC BLOOD PRESSURE: 84 MMHG | HEIGHT: 65 IN | BODY MASS INDEX: 44.44 KG/M2 | SYSTOLIC BLOOD PRESSURE: 134 MMHG | WEIGHT: 266.75 LBS | HEART RATE: 80 BPM

## 2024-05-07 DIAGNOSIS — C54.1 ENDOMETRIAL CANCER: Primary | ICD-10-CM

## 2024-05-07 PROCEDURE — 99214 OFFICE O/P EST MOD 30 MIN: CPT | Mod: S$GLB,,, | Performed by: OBSTETRICS & GYNECOLOGY

## 2024-05-07 NOTE — PROGRESS NOTES
Subjective:      Patient ID: Adriana Murphy is a 84 y.o. female.    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 Frias 12/17/22  Extensive PVD with bilateral iliac stents and anticoagulation      HPI  Here today for continued f/u.  Has not had any issues since last visit.  Denies VB.  Reports normal bladder and bowel.  Review of Systems   Constitutional:  Negative for activity change, appetite change, chills, fatigue and fever.   HENT:  Negative for hearing loss, mouth sores, nosebleeds, sore throat and tinnitus.    Eyes:  Negative for visual disturbance.   Respiratory:  Negative for cough, chest tightness, shortness of breath and wheezing.    Cardiovascular:  Negative for chest pain and leg swelling.   Gastrointestinal:  Negative for abdominal distention, abdominal pain, blood in stool, constipation, diarrhea, nausea and vomiting.   Genitourinary:  Negative for dysuria, flank pain, frequency, hematuria, pelvic pain, vaginal bleeding, vaginal discharge and vaginal pain.   Musculoskeletal:  Negative for arthralgias and back pain.   Skin:  Negative for rash.   Neurological:  Negative for dizziness, seizures, syncope, weakness and numbness.   Hematological:  Does not bruise/bleed easily.   Psychiatric/Behavioral:  Negative for confusion and sleep disturbance. The patient is not nervous/anxious.        Objective:   Physical Exam:   Constitutional: She is oriented to person, place, and time. She appears well-developed and well-nourished. No distress.    HENT:   Head: Normocephalic and atraumatic.    Eyes: No scleral icterus.     Cardiovascular:       Exam reveals no cyanosis and no edema.        Pulmonary/Chest: Effort normal. No respiratory distress. She exhibits no tenderness.        Abdominal: Soft. She exhibits no distension (incisions  well-healed), no fluid wave and no mass. There is no abdominal tenderness. There is no rebound and no guarding. No hernia.     Genitourinary:    Vagina and rectum normal.      Pelvic exam was performed with patient supine.     Labial bartholins normal.There is no rash, tenderness or lesion on the right labia. There is no rash, tenderness or lesion on the left labia. Right adnexum displays no mass, no tenderness and no fullness. Left adnexum displays no mass, no tenderness and no fullness. No vaginal discharge, bleeding (cuff intact and healing) or prolapse of vaginal walls in the vagina. Cervix is absent.Uterus is absent.           Musculoskeletal: Normal range of motion and moves all extremeties. No edema.      Lymphadenopathy:     She has no cervical adenopathy.    Neurological: She is alert and oriented to person, place, and time.    Skin: Skin is warm and dry. No cyanosis. No pallor.    Psychiatric: She has a normal mood and affect. Her behavior is normal.       Assessment:     1. Endometrial cancer        Plan:       SATISH on exam.  RTC 6 months.

## 2024-06-22 ENCOUNTER — HOSPITAL ENCOUNTER (EMERGENCY)
Facility: HOSPITAL | Age: 84
Discharge: HOME | End: 2024-06-22
Attending: EMERGENCY MEDICINE
Payer: MEDICARE

## 2024-06-22 VITALS
OXYGEN SATURATION: 99 % | HEIGHT: 65 IN | HEART RATE: 102 BPM | TEMPERATURE: 98 F | DIASTOLIC BLOOD PRESSURE: 76 MMHG | SYSTOLIC BLOOD PRESSURE: 142 MMHG | BODY MASS INDEX: 43.32 KG/M2 | WEIGHT: 260 LBS | RESPIRATION RATE: 20 BRPM

## 2024-06-22 DIAGNOSIS — S43.401A SPRAIN OF RIGHT SHOULDER, UNSPECIFIED SHOULDER SPRAIN TYPE, INITIAL ENCOUNTER: ICD-10-CM

## 2024-06-22 DIAGNOSIS — R52 PAIN AGGRAVATED BY EXERCISE: ICD-10-CM

## 2024-06-22 DIAGNOSIS — S70.01XA CONTUSION OF RIGHT HIP, INITIAL ENCOUNTER: Primary | ICD-10-CM

## 2024-06-22 PROCEDURE — 99284 EMERGENCY DEPT VISIT MOD MDM: CPT | Mod: 25

## 2024-06-22 RX ORDER — TRAMADOL HYDROCHLORIDE 50 MG/1
50 TABLET ORAL EVERY 6 HOURS PRN
Qty: 12 TABLET | Refills: 0 | Status: SHIPPED | OUTPATIENT
Start: 2024-06-22

## 2024-06-23 NOTE — DISCHARGE INSTRUCTIONS
Be sure to take your arm out of the sling multiple times a day and take it through full range of motion

## 2024-06-23 NOTE — ED PROVIDER NOTES
Encounter Date: 6/22/2024       History     Chief Complaint   Patient presents with    Fall     Fell today at 5:30.  C/O right shoulder pain.  Pt on plavix     84-year-old female status post fall around 530 today presents with right shoulder pain.  +decreased range of motion.  No deformity or swelling.  No neck or back pain.  Patient is on Plavix however no head trauma.  Denies syncope and LOC. no neuro symptoms.  Patient also fell on her left hip however she is ambulating and does not have any hip pain.    The history is provided by the patient.     Review of patient's allergies indicates:   Allergen Reactions    Cefazolin Rash     Other reaction(s): BLISTERS    Iodine Nausea And Vomiting and Rash      / CAN NOT EAT SHELLFISH CAN NOT USE BETADINE    Ibuprofen Rash     Almost all NSAIDS - CAN TAKE TYLENOL AND TORADOL    Adhesive tape-silicones      Other Reaction(s): pulls skin    paper tape allergy    Aspirin      Other reaction(s): Rectal bleeding, Stomach pain    Chocolate flavor     Propoxyphene Hallucinations    Shellfish containing products     Statins-hmg-coa reductase inhibitors      Other Reaction(s): body aches, stomach pain    Sulfa (sulfonamide antibiotics)      Past Medical History:   Diagnosis Date    Anticoagulant long-term use     Arthritis     Asthma     DVT (deep vein thrombosis) in pregnancy     GERD (gastroesophageal reflux disease)     Hypertension     PAD (peripheral artery disease)     Post-menopausal bleeding     Pulmonary embolus     Thyroid disease     HYPO     Past Surgical History:   Procedure Laterality Date    APPENDECTOMY      btl      CATARACT EXTRACTION Bilateral     CHOLECYSTECTOMY      DILATION AND CURETTAGE OF UTERUS      EXAMINATION UNDER ANESTHESIA N/A 12/20/2022    Procedure: EXAM UNDER ANESTHESIA;  Surgeon: Vargas Frias MD;  Location: [facility];  Service: OB/GYN;  Laterality: N/A;    EYE SURGERY      HYSTERECTOMY  11-    ivc filter      RE-EXPLORATION FOR BLEEDING  N/A 12/20/2022    Procedure: RE-EXPLORATION, FOR BLEEDING;  Surgeon: Vargas Frias MD;  Location: [facility] OR;  Service: OB/GYN;  Laterality: N/A;    REPAIR OF VAGINAL CUFF  12/20/2022    Procedure: REPAIR, VAGINAL CUFF;  Surgeon: Vargas Frias MD;  Location: [facility] OR;  Service: OB/GYN;;    ROBOT-ASSISTED LAPAROSCOPIC ABDOMINAL HYSTERECTOMY USING DA JESSICA XI N/A 11/15/2022    Procedure: XI ROBOTIC HYSTERECTOMY;  Surgeon: Hugo Bradford MD;  Location: [facility] OR;  Service: OB/GYN;  Laterality: N/A;    ROBOT-ASSISTED LAPAROSCOPIC SALPINGO-OOPHORECTOMY USING DA JESSICA XI Bilateral 11/15/2022    Procedure: XI ROBOTIC SALPINGO-OOPHORECTOMY;  Surgeon: Hugo Bradford MD;  Location: [facility] OR;  Service: OB/GYN;  Laterality: Bilateral;    SHOULDER SURGERY Right 2017    TUBAL LIGATION  2/24/1979     Family History   Problem Relation Name Age of Onset    Arthritis Mother Ema Kenny     Cancer Father Eliseo' and mother Ema     Diabetes Father Eliseo' and mother Ema     Hypertension Father Eliseo' and mother Ema     Alcohol abuse Brother Vargas      Social History     Tobacco Use    Smoking status: Never    Smokeless tobacco: Never    Tobacco comments:     Experimented in 20's   Substance Use Topics    Alcohol use: Never    Drug use: Never     Review of Systems   Constitutional:  Negative for chills, diaphoresis, fatigue and fever.   HENT:  Negative for congestion, drooling, ear discharge, ear pain, postnasal drip, rhinorrhea, sinus pressure, sinus pain, sore throat and tinnitus.    Eyes:  Negative for discharge.   Respiratory:  Negative for cough, chest tightness, shortness of breath and wheezing.    Cardiovascular:  Negative for chest pain and palpitations.   Gastrointestinal:  Negative for abdominal pain, diarrhea, nausea and vomiting.   Genitourinary:  Negative for frequency, hematuria and urgency.   Musculoskeletal:  Negative for back pain, neck pain and neck stiffness.   Skin:  Negative for rash.    Neurological:  Negative for syncope, weakness, light-headedness, numbness and headaches.   Psychiatric/Behavioral:  Negative for suicidal ideas.        Physical Exam     Initial Vitals [06/22/24 2158]   BP Pulse Resp Temp SpO2   (!) 142/76 102 20 98.3 °F (36.8 °C) 99 %      MAP       --         Physical Exam    Constitutional: She appears well-nourished. No distress.   HENT:   Head: Atraumatic.   Eyes: Conjunctivae are normal.   Pulmonary/Chest: No respiratory distress.   Musculoskeletal:      Right shoulder: Tenderness and bony tenderness present. No swelling or deformity. Decreased range of motion. Normal strength. Normal pulse.      Cervical back: Normal.      Thoracic back: Normal.      Lumbar back: Normal.      Right hip: No tenderness or bony tenderness. Normal range of motion. Normal strength.     Neurological: She is alert and oriented to person, place, and time. She has normal strength. No sensory deficit.   Skin: Skin is warm and dry.         ED Course   Procedures  Labs Reviewed - No data to display       Imaging Results              X-Ray Hip 2 or 3 views Right with Pelvis when performed (Final result)  Result time 06/22/24 23:15:02      Final result by Dudley Alegre MD (06/22/24 23:15:02)                   Impression:      No acute osseous abnormality identified.      Electronically signed by: Dudley Alegre  Date:    06/22/2024  Time:    23:15               Narrative:    EXAMINATION:  XR HIP WITH PELVIS WHEN PERFORMED 2 OR 3 VIEWS RIGHT    CLINICAL HISTORY:  Pain.    COMPARISON:  April 6, 2016.    FINDINGS:  Bilateral sacroiliac joints degenerative changes without significant interval change.  There is mild degenerative narrowing of the left hip joint.  No acute fracture or dislocation identified.  Bilateral iliac stent grafts.                                       X-Ray Shoulder Complete 2 View Right (Final result)  Result time 06/22/24 23:13:24      Final result by Dudley Alegre MD (06/22/24  23:13:24)                   Impression:      No osseous abnormality identified.      Electronically signed by: Dudley Alegre  Date:    06/22/2024  Time:    23:13               Narrative:    EXAMINATION:  XR SHOULDER COMPLETE 2 OR MORE VIEWS RIGHT    CLINICAL HISTORY:  Pain, unspecified    TECHNIQUE:  Three views.    COMPARISON:  None available.    FINDINGS:  There is right shoulder arthroplasty which is in satisfactory position and alignment.  No acute fracture or dislocation.  Demineralization of the bones.                                       Medications - No data to display  Medical Decision Making  Medical Decision Making  Problem list/ differential diagnosis including but not limited to:  Shoulder contusion, rotator cuff tear, fracture, dislocation, head trauma, spinal column injury, hip fracture/contusion, pelvic ramus fracture,    Patient's chronic illnesses impacting care:  Hypertension, PAD    Diagnostic test considered but not ordered:    My interpretations:      Radiology reports  Hip and shoulder x-ray: No fracture or dislocation    Discussion of case with external qualified healthcare professionals:  Not applicable    Review of external notes( inpt, ems, NH, clinic):      Decision rules/scores:    Medications reviewed:  Medications ordered in the ER:  Sling  Discharge prescriptions:  Tramadol    Social variables possible impacting patient's healthcare:    Code status/discussion    Shared decision making:    Consideration for admission versus discharge: stable for discharge     Amount and/or Complexity of Data Reviewed  Radiology: ordered.    Risk  Prescription drug management.                                      Clinical Impression:  Final diagnoses:  [R52] Pain aggravated by exercise  [S70.01XA] Contusion of right hip, initial encounter (Primary)  [S43.401A] Sprain of right shoulder, unspecified shoulder sprain type, initial encounter          ED Disposition Condition    Discharge Stable          ED  Prescriptions       Medication Sig Dispense Start Date End Date Auth. Provider    traMADoL (ULTRAM) 50 mg tablet Take 1 tablet (50 mg total) by mouth every 6 (six) hours as needed. 12 tablet 6/22/2024 -- Nicolas Cervantes MD          Follow-up Information    None          Nicolas Cervantes MD  06/22/24 6041

## 2024-07-30 ENCOUNTER — OFFICE VISIT (OUTPATIENT)
Dept: URGENT CARE | Facility: CLINIC | Age: 84
End: 2024-07-30
Payer: MEDICARE

## 2024-07-30 VITALS
HEART RATE: 78 BPM | OXYGEN SATURATION: 96 % | BODY MASS INDEX: 43.32 KG/M2 | HEIGHT: 65 IN | DIASTOLIC BLOOD PRESSURE: 82 MMHG | TEMPERATURE: 98 F | WEIGHT: 260 LBS | RESPIRATION RATE: 18 BRPM | SYSTOLIC BLOOD PRESSURE: 127 MMHG

## 2024-07-30 DIAGNOSIS — R30.0 DYSURIA: ICD-10-CM

## 2024-07-30 DIAGNOSIS — R10.9 FLANK PAIN: Primary | ICD-10-CM

## 2024-07-30 LAB
BILIRUB UR QL STRIP: NEGATIVE
GLUCOSE UR QL STRIP: NEGATIVE
KETONES UR QL STRIP: NEGATIVE
LEUKOCYTE ESTERASE UR QL STRIP: NEGATIVE
PH, POC UA: 6
POC BLOOD, URINE: NEGATIVE
POC NITRATES, URINE: NEGATIVE
PROT UR QL STRIP: NEGATIVE
SP GR UR STRIP: 1.01 (ref 1–1.03)
UROBILINOGEN UR STRIP-ACNC: NORMAL (ref 0.1–1.1)

## 2024-07-30 PROCEDURE — 87086 URINE CULTURE/COLONY COUNT: CPT | Performed by: NURSE PRACTITIONER

## 2024-07-30 RX ORDER — NITROFURANTOIN 25; 75 MG/1; MG/1
100 CAPSULE ORAL 2 TIMES DAILY
Qty: 20 CAPSULE | Refills: 0 | Status: SHIPPED | OUTPATIENT
Start: 2024-07-30 | End: 2024-08-09

## 2024-07-30 NOTE — PROGRESS NOTES
Glaucoma    GERD (gastroesophageal reflux disease)    Hypertension    Hyperlipidemia    Arthritis legs   Chronic pain disorder right side thigh hip   Wears partial dentures upper and lower   Anxiety        Review of Systems/Medical History  Patient summary reviewed  Chart reviewed      Cardiovascular  Hyperlipidemia, Hypertension ,    Pulmonary       GI/Hepatic    GERD ,             Endo/Other     GYN       Hematology   Musculoskeletal    Arthritis     Neurology   Psychology   Anxiety,   Chronic pain,                 Anesthesia Plan  ASA Score- 3     Anesthesia Type- IV sedation with anesthesia with ASA Monitors  Additional Monitors:   Airway Plan:         Plan Factors-    Induction- intravenous  Postoperative Plan-     Informed Consent- Anesthetic plan and risks discussed with patient  Subjective:      Patient ID: Adriana Murphy is a 84 y.o. female.    Vitals:  vitals were not taken for this visit.     Chief Complaint: Back Pain     Patient is a 84 y.o. female who presents to urgent care with complaints of right side abdominal and back pain, decrease in appetite, nausea, diarrhea x 3 days. Alleviating factors include none.  Patient denies fever. Patient states she took Macrobid for a UTI last month.   ROS   Objective:     Physical Exam    Assessment:     No diagnosis found.    Plan:       There are no diagnoses linked to this encounter.

## 2024-07-30 NOTE — PATIENT INSTRUCTIONS
Maintain adequate hydration.   Take prescription nitrofurantoin as directed   Monitor symptoms closely.   Take Tylenol or ibuprofen OTC for fever and pain   Watch out for worsening urinary symptoms, blood in the urine, flank pain, fever, chills, myalgias, and vomiting.    Seek further medical attention immediately at the 1st sign.  Go to ER for worsening abdominal pain as instructed    Follow-up with your PCP within 72 hours.    We will call you with urine culture results within the next 24-48 hours.

## 2024-07-30 NOTE — PROGRESS NOTES
"Subjective:      Patient ID: Adriana Murphy is a 84 y.o. female.    Vitals:  height is 5' 5" (1.651 m) and weight is 117.9 kg (260 lb). Her temperature is 98 °F (36.7 °C). Her blood pressure is 127/82 and her pulse is 78. Her respiration is 18 and oxygen saturation is 96%.     Chief Complaint: Back Pain    84-year-old female presents with mild dysuria, urinary frequency and urgency.  States completed Macrobid approximately 1 month ago for urinary tract infection with some relief.  Intermittent abdominal discomfort worse in the right upper quadrant, states no gallbladder.  Mild nausea without vomiting or diarrhea.  Has Zofran at home.    Constitution: Negative for fever.   Gastrointestinal:  Positive for abdominal pain (suprapubic).   Genitourinary:  Positive for dysuria, frequency and urgency. Negative for flank pain.    Objective:     Physical Exam  Previous History:     Review of patient's allergies indicates:   Allergen Reactions    Cefazolin Rash     Other reaction(s): BLISTERS    Iodine Nausea And Vomiting and Rash      / CAN NOT EAT SHELLFISH CAN NOT USE BETADINE    Ibuprofen Rash     Almost all NSAIDS - CAN TAKE TYLENOL AND TORADOL    Adhesive tape-silicones      Other Reaction(s): pulls skin    paper tape allergy    Aspirin      Other reaction(s): Rectal bleeding, Stomach pain    Chocolate flavor     Propoxyphene Hallucinations    Shellfish containing products     Statins-hmg-coa reductase inhibitors      Other Reaction(s): body aches, stomach pain    Sulfa (sulfonamide antibiotics)        Past Medical History:   Diagnosis Date    Anticoagulant long-term use     Arthritis     Asthma     DVT (deep vein thrombosis) in pregnancy     GERD (gastroesophageal reflux disease)     Hypertension     PAD (peripheral artery disease)     Post-menopausal bleeding     Pulmonary embolus     Thyroid disease     HYPO     Current Outpatient Medications   Medication Instructions    acetaminophen (TYLENOL) 650 mg, Oral, Every " 8 hours PRN    allopurinoL (ZYLOPRIM) 300 mg, Oral    b complex vitamins tablet 1 tablet, Oral, Daily    cetirizine (ZYRTEC) 10 mg, Oral, Daily    cholecalciferol (vitamin D3) 50,000 Units, Oral, Every 7 days    clopidogreL (PLAVIX) 75 mg, Oral, Daily    colchicine (COLCRYS) 0.6 mg, Oral    EUTHYROX 75 mcg, Oral, Daily    folic acid (FOLVITE) 1,000 mcg, Oral, Daily    furosemide (LASIX) 20 mg, Oral, 2 times daily    hydroCHLOROthiazide (HYDRODIURIL) 25 mg, Oral, Daily    losartan (COZAAR) 50 mg, Oral, Daily    magnesium oxide-Mg AA chelate (MG-PLUS-PROTEIN) 133 mg Tab Oral    multivitamin capsule 1 capsule, Oral, Daily    omega-3 fatty acids/fish oil (FISH OIL-OMEGA-3 FATTY ACIDS) 300-1,000 mg capsule 1 capsule, Daily    ondansetron (ZOFRAN-ODT) 4 mg, Oral, Every 8 hours PRN    oxybutynin (DITROPAN-XL) 10 mg, Oral    pantoprazole (PROTONIX) 40 mg, Oral, 2 times daily    potassium chloride (K-TAB) 20 mEq 20 mEq, Oral, Daily    traMADoL (ULTRAM) 50 mg, Oral, Every 6 hours PRN     Past Surgical History:   Procedure Laterality Date    APPENDECTOMY      btl      CATARACT EXTRACTION Bilateral     CHOLECYSTECTOMY      DILATION AND CURETTAGE OF UTERUS      EXAMINATION UNDER ANESTHESIA N/A 12/20/2022    Procedure: EXAM UNDER ANESTHESIA;  Surgeon: Vargas Frias MD;  Location: [facility] OR;  Service: OB/GYN;  Laterality: N/A;    EYE SURGERY      HYSTERECTOMY  11-    ivc filter      RE-EXPLORATION FOR BLEEDING N/A 12/20/2022    Procedure: RE-EXPLORATION, FOR BLEEDING;  Surgeon: Vargas Frias MD;  Location: [facility] OR;  Service: OB/GYN;  Laterality: N/A;    REPAIR OF VAGINAL CUFF  12/20/2022    Procedure: REPAIR, VAGINAL CUFF;  Surgeon: Vargas Frias MD;  Location: [facility] OR;  Service: OB/GYN;;    ROBOT-ASSISTED LAPAROSCOPIC ABDOMINAL HYSTERECTOMY USING DA JESSICA XI N/A 11/15/2022    Procedure: XI ROBOTIC HYSTERECTOMY;  Surgeon: Hugo Bradford MD;  Location: [facility] OR;  Service: OB/GYN;  Laterality: N/A;     ROBOT-ASSISTED LAPAROSCOPIC SALPINGO-OOPHORECTOMY USING DA JESSICA XI Bilateral 11/15/2022    Procedure: XI ROBOTIC SALPINGO-OOPHORECTOMY;  Surgeon: Hugo Bradford MD;  Location: [facility];  Service: OB/GYN;  Laterality: Bilateral;    SHOULDER SURGERY Right 2017    TUBAL LIGATION  2/24/1979     Family History   Problem Relation Name Age of Onset    Arthritis Mother Ema Kenny     Cancer Father Marciano and mother Ema     Diabetes Father Marciano and mother Ema     Hypertension Father Marciano and mother Ema     Alcohol abuse Brother Vargas        Social History     Tobacco Use    Smoking status: Never    Smokeless tobacco: Never    Tobacco comments:     Experimented in 20's   Substance Use Topics    Alcohol use: Never    Drug use: Never     Assessment:     1. Flank pain    2. Dysuria      Office Visit on 07/30/2024   Component Date Value Ref Range Status    POC Blood, Urine 07/30/2024 Negative  Negative Final    POC Bilirubin, Urine 07/30/2024 Negative  Negative Final    POC Urobilinogen, Urine 07/30/2024 norm  0.1 - 1.1 Final    POC Ketones, Urine 07/30/2024 Negative  Negative Final    POC Protein, Urine 07/30/2024 Negative  Negative Final    POC Nitrates, Urine 07/30/2024 Negative  Negative Final    POC Glucose, Urine 07/30/2024 Negative  Negative Final    pH, UA 07/30/2024 6   Final    POC Specific Gravity, Urine 07/30/2024 1.010  1.003 - 1.029 Final    POC Leukocytes, Urine 07/30/2024 Negative  Negative Final       Plan:   Maintain adequate hydration.   Take prescription nitrofurantoin as directed   Monitor symptoms closely.   Take Tylenol or ibuprofen OTC for fever and pain   Watch out for worsening urinary symptoms, blood in the urine, flank pain, fever, chills, myalgias, and vomiting.    Seek further medical attention immediately at the 1st sign.  Go to ER for worsening abdominal pain as instructed    Follow-up with your PCP within 72 hours.    We will call you with urine culture results within the  next 24-48 hours.   Patient states her last urinary tract infection was treated with a normal POC urinalysis.  Completed Macrobid over a month ago with some relief  Flank pain  -     POCT Urinalysis, Dipstick, Automated, W/O Scope    Dysuria

## 2024-08-01 LAB — BACTERIA UR CULT: NO GROWTH

## 2024-09-12 ENCOUNTER — OFFICE VISIT (OUTPATIENT)
Dept: ORTHOPEDICS | Facility: CLINIC | Age: 84
End: 2024-09-12
Payer: MEDICARE

## 2024-09-12 VITALS
BODY MASS INDEX: 43.31 KG/M2 | HEIGHT: 65 IN | SYSTOLIC BLOOD PRESSURE: 122 MMHG | DIASTOLIC BLOOD PRESSURE: 80 MMHG | WEIGHT: 259.94 LBS

## 2024-09-12 DIAGNOSIS — M12.812 LEFT ROTATOR CUFF TEAR ARTHROPATHY: Primary | ICD-10-CM

## 2024-09-12 DIAGNOSIS — Z86.711 HISTORY OF PULMONARY EMBOLISM: ICD-10-CM

## 2024-09-12 DIAGNOSIS — M54.16 LUMBAR RADICULOPATHY, CHRONIC: ICD-10-CM

## 2024-09-12 DIAGNOSIS — M75.102 LEFT ROTATOR CUFF TEAR ARTHROPATHY: Primary | ICD-10-CM

## 2024-09-12 DIAGNOSIS — E66.01 MORBID OBESITY: ICD-10-CM

## 2024-09-12 RX ORDER — BETAMETHASONE SODIUM PHOSPHATE AND BETAMETHASONE ACETATE 3; 3 MG/ML; MG/ML
6 INJECTION, SUSPENSION INTRA-ARTICULAR; INTRALESIONAL; INTRAMUSCULAR; SOFT TISSUE
Status: DISCONTINUED | OUTPATIENT
Start: 2024-09-12 | End: 2024-09-12 | Stop reason: HOSPADM

## 2024-09-12 RX ORDER — LIDOCAINE HYDROCHLORIDE 20 MG/ML
2 INJECTION, SOLUTION INFILTRATION; PERINEURAL
Status: DISCONTINUED | OUTPATIENT
Start: 2024-09-12 | End: 2024-09-12 | Stop reason: HOSPADM

## 2024-09-12 RX ADMIN — LIDOCAINE HYDROCHLORIDE 2 ML: 20 INJECTION, SOLUTION INFILTRATION; PERINEURAL at 03:09

## 2024-09-12 RX ADMIN — BETAMETHASONE SODIUM PHOSPHATE AND BETAMETHASONE ACETATE 6 MG: 3; 3 INJECTION, SUSPENSION INTRA-ARTICULAR; INTRALESIONAL; INTRAMUSCULAR; SOFT TISSUE at 03:09

## 2024-09-12 NOTE — PROCEDURES
Large Joint Aspiration/Injection: L subacromial bursa    Date/Time: 9/12/2024 3:30 PM    Performed by: Fili Jones MD  Authorized by: Fili Jones MD    Consent Done?:  Yes (Verbal)  Indications:  Pain and arthritis  Site marked: the procedure site was marked    Timeout: prior to procedure the correct patient, procedure, and site was verified    Prep: patient was prepped and draped in usual sterile fashion    Approach:  Posterior  Location:  Shoulder  Site:  L subacromial bursa  Medications:  2 mL LIDOcaine HCL 20 mg/ml (2%) 20 mg/mL (2 %); 6 mg betamethasone acetate-betamethasone sodium phosphate 6 mg/mL  Patient tolerance:  Patient tolerated the procedure well with no immediate complications

## 2024-09-12 NOTE — PROGRESS NOTES
[facility]  Orthopedic Clinic Note      Chief Complaint:   Chief Complaint   Patient presents with    Left Shoulder - Follow-up     8 months f/u left shoulder injection 1/25/24 with relief and lasted til a month ago, here for injection today, wants a referral for [facility], few falls (2-3, not on shoulder)      Referring Physician: No ref. provider found      History of Present Illness:    This is a 84 y.o. year old female presenting with complaints of left shoulder pain worsening over the last two years.  This was exacerbated by a fall that occurred on 10/12/2023.  She was evaluated in the ED Patient has noticed progressive decrease in range of motion and weakness.  Patient states pain is moderate to severe.  Patient complains of night pain.  Patient been treated with over the counter medications with no improvement.  She also complains of left ankle pain since the same fall that occurred on 10/12/2023.  She states that she was standing on tiptoes when she felt a popping sensation in her foot.  She was evaluated in the ED following this injury where x-rays were performed.  X-rays demonstrated no acute osseous pathology.  She is ambulating unassisted.  01/25/2024 patient presents for follow-up on left shoulder pain.  She received a left subacromial corticosteroid injection at her prior visit she reports great relief in symptoms following this injection and that her symptoms have just begun to return.  She would like to repeat the injection today if possible.  She also complains of right wrist pain for the last few months.  She reports pain over the radial aspect of the wrist that worsens with moving the thumb and forming a fist.  04/30/2024 for follow-up on left shoulder pain.  She received a left subacromial corticosteroid injection at her prior visit.  She reports this is his extremely helpful and is still providing her with relief of symptoms.  09/12/2024 patient presents for follow-up on left  shoulder pain.  She received a left subacromial corticosteroid injection in January 2024.  She reports that this injection was helpful until approximately 1 month ago.  She would like to repeat the injection today.  She is also complaining of left buttock pain that travels down her leg.      Past Medical History:   Diagnosis Date    Anticoagulant long-term use     Arthritis     Asthma     DVT (deep vein thrombosis) in pregnancy     GERD (gastroesophageal reflux disease)     Hypertension     PAD (peripheral artery disease)     Post-menopausal bleeding     Pulmonary embolus     Thyroid disease     HYPO       Past Surgical History:   Procedure Laterality Date    APPENDECTOMY      btl      CATARACT EXTRACTION Bilateral     CHOLECYSTECTOMY      DILATION AND CURETTAGE OF UTERUS      EXAMINATION UNDER ANESTHESIA N/A 12/20/2022    Procedure: EXAM UNDER ANESTHESIA;  Surgeon: Vargas Frias MD;  Location: [facility] OR;  Service: OB/GYN;  Laterality: N/A;    EYE SURGERY      HYSTERECTOMY  11-    ivc filter      RE-EXPLORATION FOR BLEEDING N/A 12/20/2022    Procedure: RE-EXPLORATION, FOR BLEEDING;  Surgeon: Vargas Frias MD;  Location: [facility] OR;  Service: OB/GYN;  Laterality: N/A;    REPAIR OF VAGINAL CUFF  12/20/2022    Procedure: REPAIR, VAGINAL CUFF;  Surgeon: Vargas Frias MD;  Location: [facility] OR;  Service: OB/GYN;;    ROBOT-ASSISTED LAPAROSCOPIC ABDOMINAL HYSTERECTOMY USING DA JESSICA XI N/A 11/15/2022    Procedure: XI ROBOTIC HYSTERECTOMY;  Surgeon: Hugo Bradford MD;  Location: [facility] OR;  Service: OB/GYN;  Laterality: N/A;    ROBOT-ASSISTED LAPAROSCOPIC SALPINGO-OOPHORECTOMY USING DA JESSICA XI Bilateral 11/15/2022    Procedure: XI ROBOTIC SALPINGO-OOPHORECTOMY;  Surgeon: Hugo Bradford MD;  Location: [facility];  Service: OB/GYN;  Laterality: Bilateral;    SHOULDER SURGERY Right 2017    TUBAL LIGATION  2/24/1979       Current Outpatient Medications   Medication Sig    allopurinoL (ZYLOPRIM) 300 MG tablet Take  300 mg by mouth.    b complex vitamins tablet Take 1 tablet by mouth once daily.    cetirizine (ZYRTEC) 10 MG tablet Take 10 mg by mouth once daily.    cholecalciferol, vitamin D3, 1,250 mcg (50,000 unit) capsule Take 50,000 Units by mouth every 7 days.    clopidogreL (PLAVIX) 75 mg tablet Take 75 mg by mouth once daily.    colchicine (COLCRYS) 0.6 mg tablet Take 0.6 mg by mouth.    EUTHYROX 75 mcg tablet Take 75 mcg by mouth once daily.    folic acid (FOLVITE) 1 MG tablet Take 1,000 mcg by mouth once daily.    furosemide (LASIX) 20 MG tablet Take 20 mg by mouth 2 (two) times daily.    hydroCHLOROthiazide (HYDRODIURIL) 25 MG tablet Take 25 mg by mouth once daily.    losartan (COZAAR) 50 MG tablet Take 50 mg by mouth once daily.    magnesium oxide-Mg AA chelate (MG-PLUS-PROTEIN) 133 mg Tab Take by mouth.    multivitamin capsule Take 1 capsule by mouth once daily.    ondansetron (ZOFRAN-ODT) 4 MG TbDL Take 4 mg by mouth every 8 (eight) hours as needed.    oxybutynin (DITROPAN-XL) 10 MG 24 hr tablet Take 10 mg by mouth.    pantoprazole (PROTONIX) 40 MG tablet Take 40 mg by mouth 2 (two) times daily.    potassium chloride (K-TAB) 20 mEq Take 20 mEq by mouth once daily.    traMADoL (ULTRAM) 50 mg tablet Take 1 tablet (50 mg total) by mouth every 6 (six) hours as needed.    acetaminophen (TYLENOL) 650 MG TbSR Take 1 tablet (650 mg total) by mouth every 8 (eight) hours as needed (pain). (Patient not taking: Reported on 9/12/2024)    omega-3 fatty acids/fish oil (FISH OIL-OMEGA-3 FATTY ACIDS) 300-1,000 mg capsule Take 1 capsule by mouth once daily. (Patient not taking: Reported on 7/30/2024)     No current facility-administered medications for this visit.       Review of patient's allergies indicates:   Allergen Reactions    Cefazolin Rash     Other reaction(s): BLISTERS    Iodine Nausea And Vomiting and Rash      / CAN NOT EAT SHELLFISH CAN NOT USE BETADINE    Ibuprofen Rash     Almost all NSAIDS - CAN TAKE TYLENOL AND  TORADOL    Adhesive tape-silicones      Other Reaction(s): pulls skin    paper tape allergy    Aspirin      Other reaction(s): Rectal bleeding, Stomach pain    Chocolate flavor     Propoxyphene Hallucinations    Shellfish containing products     Statins-hmg-coa reductase inhibitors      Other Reaction(s): body aches, stomach pain    Sulfa (sulfonamide antibiotics)        Family History   Problem Relation Name Age of Onset    Arthritis Mother Ema Kenny     Cancer Father Marciano and mother Ema     Diabetes Father Marciano and mother Ema     Hypertension Father Marciano and mother Ema     Alcohol abuse Brother Vargas        Social History     Socioeconomic History    Marital status:    Tobacco Use    Smoking status: Never    Smokeless tobacco: Never    Tobacco comments:     Experimented in 20's   Substance and Sexual Activity    Alcohol use: Never    Drug use: Never    Sexual activity: Not Currently     Partners: Male     Birth control/protection: I.U.D., Spermicide     Social Determinants of Health     Financial Resource Strain: Low Risk  (11/16/2022)    Overall Financial Resource Strain (CARDIA)     Difficulty of Paying Living Expenses: Not hard at all   Food Insecurity: No Food Insecurity (11/16/2022)    Hunger Vital Sign     Worried About Running Out of Food in the Last Year: Never true     Ran Out of Food in the Last Year: Never true   Transportation Needs: No Transportation Needs (11/16/2022)    PRAPARE - Transportation     Lack of Transportation (Medical): No     Lack of Transportation (Non-Medical): No   Physical Activity: Inactive (11/16/2022)    Exercise Vital Sign     Days of Exercise per Week: 0 days     Minutes of Exercise per Session: 0 min   Stress: No Stress Concern Present (11/16/2022)    [employer] - Occupational Stress Questionnaire     Feeling of Stress : Only a little   Housing Stability: Low Risk  (11/16/2022)    Housing Stability Vital Sign     " Unable to Pay for Housing in the Last Year: No     Number of Places Lived in the Last Year: 1     Unstable Housing in the Last Year: No           Review of Systems:  All review of systems negative except for those stated in the HPI.    Examination:    Vital Signs:    Vitals:    09/12/24 1606   BP: 122/80   Pulse: (P) 86   Weight: 117.9 kg (259 lb 14.8 oz)   Height: 5' 5" (1.651 m)         Body mass index is 43.25 kg/m².    Physical Examination:  General: Well-developed, well-nourished.  Neuro: Alert and oriented x 3.  Psych: Normal mood and affect.  Card: Regular rate and rhythm  Resp: Respirations regular and unlabored  Left Shoulder Exam:  No obvious deformity. No medial or lateral scapula winging.  Forward flexion to 120 degrees.  Abduction to 120 degrees.  External and internal rotation to 50 degrees.  4/5 strength, normal skin appearance and palpable pulses distally. Sensibility normal.      Imaging:  Three views of the left shoulder demonstrated advanced degenerative changes with bone-on-bone articulation of the glenohumeral joint.  There is also acetabularization of the acromion and osteophyte formation.    Assessment: Left rotator cuff tear arthropathy  -     Ambulatory referral/consult to Physical/Occupational Therapy; Future; Expected date: 09/19/2024  -     Large Joint Aspiration/Injection: L subacromial bursa    History of pulmonary embolism    Morbid obesity    Lumbar radiculopathy, chronic  -     Ambulatory referral/consult to Physical/Occupational Therapy; Future; Expected date: 09/19/2024      Plan:  Plan to proceed with repeat left subacromial corticosteroid injection today.  Reiterated that due to the severity of her degenerative changes, surgical intervention via reverse total shoulder arthroplasty is likely the only definitive treatment for her symptoms.  However, we will definitely need to obtain preoperative clearances.  She will continue over-the-counter medications as needed for pain.  Avoid " anti-inflammatories secondary to chronic anticoagulation with Plavix.  This patient will also be placed in physical therapy for her left shoulder and lumbar radiculopathy.  If she does not respond favorably with increase in radiculopathy symptoms, then I will order an MRI of her lumbar spine.  She will return to clinic as needed for any additional issues or concerns, or if her symptoms should recur.  She verbalized understanding of the plan of care with no further questions.    Fili Jones MD personally performed the services described in this documentation, including but not limited to patient's history, physical examination, and assessment and plan of care. All medical record entries made by YASEMIN Mcfarlane were performed at his direction and in his presence. The medical record was reviewed and is accurate and complete.      Large Joint Aspiration/Injection: L subacromial bursa    Date/Time: 9/12/2024 3:30 PM    Performed by: Fili Jones MD  Authorized by: Fili Jones MD    Consent Done?:  Yes (Verbal)  Indications:  Pain and arthritis  Site marked: the procedure site was marked    Timeout: prior to procedure the correct patient, procedure, and site was verified    Prep: patient was prepped and draped in usual sterile fashion    Approach:  Posterior  Location:  Shoulder  Site:  L subacromial bursa  Medications:  2 mL LIDOcaine HCL 20 mg/ml (2%) 20 mg/mL (2 %); 6 mg betamethasone acetate-betamethasone sodium phosphate 6 mg/mL  Patient tolerance:  Patient tolerated the procedure well with no immediate complications      Follow up in about 2 months (around 11/12/2024) for Reevaluation.      DISCLAIMER: This note may have been dictated using voice recognition software and may contain grammatical errors.     NOTE: Consult report sent to referring provider via [employer].

## 2024-11-05 ENCOUNTER — OFFICE VISIT (OUTPATIENT)
Dept: GYNECOLOGIC ONCOLOGY | Facility: CLINIC | Age: 84
End: 2024-11-05
Payer: MEDICARE

## 2024-11-05 VITALS
WEIGHT: 256.81 LBS | SYSTOLIC BLOOD PRESSURE: 170 MMHG | BODY MASS INDEX: 42.79 KG/M2 | HEART RATE: 81 BPM | DIASTOLIC BLOOD PRESSURE: 81 MMHG | HEIGHT: 65 IN

## 2024-11-05 DIAGNOSIS — C54.1 ENDOMETRIAL CANCER: Primary | ICD-10-CM

## 2024-11-05 PROCEDURE — 99214 OFFICE O/P EST MOD 30 MIN: CPT | Mod: S$GLB,,, | Performed by: OBSTETRICS & GYNECOLOGY

## 2024-11-05 NOTE — PROGRESS NOTES
Subjective:      Patient ID: Adriana Murphy is a 84 y.o. female.    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 Frias 12/17/22  Extensive PVD with bilateral iliac stents and anticoagulation      HPI  Here today for continued f/u.  Has not had any issues since last visit.  Denies VB.  Reports normal bladder and bowel.  Review of Systems   Constitutional:  Negative for activity change, appetite change, chills, fatigue and fever.   HENT:  Negative for hearing loss, mouth sores, nosebleeds, sore throat and tinnitus.    Eyes:  Negative for visual disturbance.   Respiratory:  Negative for cough, chest tightness, shortness of breath and wheezing.    Cardiovascular:  Negative for chest pain and leg swelling.   Gastrointestinal:  Negative for abdominal distention, abdominal pain, blood in stool, constipation, diarrhea, nausea and vomiting.   Genitourinary:  Negative for dysuria, flank pain, frequency, hematuria, pelvic pain, vaginal bleeding, vaginal discharge and vaginal pain.   Musculoskeletal:  Negative for arthralgias and back pain.   Skin:  Negative for rash.   Neurological:  Negative for dizziness, seizures, syncope, weakness and numbness.   Hematological:  Does not bruise/bleed easily.   Psychiatric/Behavioral:  Negative for confusion and sleep disturbance. The patient is not nervous/anxious.        Objective:   Physical Exam:   Constitutional: She is oriented to person, place, and time. She appears well-developed and well-nourished. No distress.    HENT:   Head: Normocephalic and atraumatic.    Eyes: No scleral icterus.     Cardiovascular:       Exam reveals no cyanosis and no edema.        Pulmonary/Chest: Effort normal. No respiratory distress. She exhibits no tenderness.        Abdominal: Soft. She exhibits no distension (incisions well-healed), no fluid wave  and no mass. There is no abdominal tenderness. There is no rebound and no guarding. No hernia.     Genitourinary:    Vagina and rectum normal.      Pelvic exam was performed with patient supine.     Labial bartholins normal.There is no rash, tenderness or lesion on the right labia. There is no rash, tenderness or lesion on the left labia. Right adnexum displays no mass, no tenderness and no fullness. Left adnexum displays no mass, no tenderness and no fullness. No vaginal discharge, bleeding (cuff intact and healing) or prolapse of vaginal walls in the vagina. Cervix is absent.Uterus is absent.           Musculoskeletal: Normal range of motion and moves all extremeties. No edema.      Lymphadenopathy:     She has no cervical adenopathy.    Neurological: She is alert and oriented to person, place, and time.    Skin: Skin is warm and dry. No cyanosis. No pallor.    Psychiatric: She has a normal mood and affect. Her behavior is normal.       Assessment:     1. Endometrial cancer        Plan:       SATISH on exam.  RTC 6 months.

## 2024-11-12 ENCOUNTER — OFFICE VISIT (OUTPATIENT)
Dept: ORTHOPEDICS | Facility: CLINIC | Age: 84
End: 2024-11-12
Payer: MEDICARE

## 2024-11-12 VITALS
SYSTOLIC BLOOD PRESSURE: 135 MMHG | HEART RATE: 77 BPM | HEIGHT: 65 IN | BODY MASS INDEX: 42.32 KG/M2 | WEIGHT: 254 LBS | DIASTOLIC BLOOD PRESSURE: 80 MMHG

## 2024-11-12 DIAGNOSIS — M12.812 LEFT ROTATOR CUFF TEAR ARTHROPATHY: Primary | ICD-10-CM

## 2024-11-12 DIAGNOSIS — E66.01 MORBID OBESITY: ICD-10-CM

## 2024-11-12 DIAGNOSIS — Z86.711 HISTORY OF PULMONARY EMBOLISM: ICD-10-CM

## 2024-11-12 DIAGNOSIS — Z79.01 CHRONIC ANTICOAGULATION: ICD-10-CM

## 2024-11-12 DIAGNOSIS — M75.102 LEFT ROTATOR CUFF TEAR ARTHROPATHY: Primary | ICD-10-CM

## 2024-11-12 PROCEDURE — 99214 OFFICE O/P EST MOD 30 MIN: CPT | Mod: ,,, | Performed by: ORTHOPAEDIC SURGERY

## 2024-11-12 NOTE — PROGRESS NOTES
[facility]  Orthopedic Clinic Note      Chief Complaint:   Chief Complaint   Patient presents with    Appointment     Patient here today for f/u left shoulder and back pain. She was placed in PT for these and states that helped her pain a lot. She still has some pain but not like it was before. She would like shoulder surgery within the next 30 days and see's cardiologist at [facility]. She takes plavix. She takes tramadol every 3-4 days for her shoulder pain, prescribed by pcp as she is unable to take anti-inflammatories due to severe stomach pain and blood in stool.      Referring Physician: No ref. provider found      History of Present Illness:    This is a 84 y.o. year old female presenting with complaints of left shoulder pain worsening over the last two years.  This was exacerbated by a fall that occurred on 10/12/2023.  She was evaluated in the ED Patient has noticed progressive decrease in range of motion and weakness.  Patient states pain is moderate to severe.  Patient complains of night pain.  Patient been treated with over the counter medications with no improvement.  She also complains of left ankle pain since the same fall that occurred on 10/12/2023.  She states that she was standing on tiptoes when she felt a popping sensation in her foot.  She was evaluated in the ED following this injury where x-rays were performed.  X-rays demonstrated no acute osseous pathology.  She is ambulating unassisted.  01/25/2024 patient presents for follow-up on left shoulder pain.  She received a left subacromial corticosteroid injection at her prior visit she reports great relief in symptoms following this injection and that her symptoms have just begun to return.  She would like to repeat the injection today if possible.  She also complains of right wrist pain for the last few months.  She reports pain over the radial aspect of the wrist that worsens with moving the thumb and forming a fist.  04/30/2024 for  follow-up on left shoulder pain.  She received a left subacromial corticosteroid injection at her prior visit.  She reports this is his extremely helpful and is still providing her with relief of symptoms.  09/12/2024 patient presents for follow-up on left shoulder pain.  She received a left subacromial corticosteroid injection in January 2024.  She reports that this injection was helpful until approximately 1 month ago.  She would like to repeat the injection today.  She is also complaining of left buttock pain that travels down her leg.  11/12/2024 patient presents for follow-up on left shoulder pain.  She was placed in formal physical therapy at her prior visit and states that it was somewhat helpful, but she continues to have symptoms.  She has failed to respond to multiple conservative treatments and would like to discuss possible surgical intervention.      Past Medical History:   Diagnosis Date    Anticoagulant long-term use     Arthritis     Asthma     DVT (deep vein thrombosis) in pregnancy     GERD (gastroesophageal reflux disease)     Hypertension     PAD (peripheral artery disease)     Post-menopausal bleeding     Pulmonary embolus     Thyroid disease     HYPO       Past Surgical History:   Procedure Laterality Date    APPENDECTOMY      btl      CATARACT EXTRACTION Bilateral     CHOLECYSTECTOMY      DILATION AND CURETTAGE OF UTERUS      EXAMINATION UNDER ANESTHESIA N/A 12/20/2022    Procedure: EXAM UNDER ANESTHESIA;  Surgeon: Vargas Frias MD;  Location: [facility];  Service: OB/GYN;  Laterality: N/A;    EYE SURGERY      HYSTERECTOMY  11-    ivc filter      RE-EXPLORATION FOR BLEEDING N/A 12/20/2022    Procedure: RE-EXPLORATION, FOR BLEEDING;  Surgeon: Vargas Frias MD;  Location: [facility] OR;  Service: OB/GYN;  Laterality: N/A;    REPAIR OF VAGINAL CUFF  12/20/2022    Procedure: REPAIR, VAGINAL CUFF;  Surgeon: Vargas Frias MD;  Location: [facility] OR;  Service: OB/GYN;;    ROBOT-ASSISTED  LAPAROSCOPIC ABDOMINAL HYSTERECTOMY USING DA JESSICA XI N/A 11/15/2022    Procedure: XI ROBOTIC HYSTERECTOMY;  Surgeon: Hugo Bradford MD;  Location: [facility] OR;  Service: OB/GYN;  Laterality: N/A;    ROBOT-ASSISTED LAPAROSCOPIC SALPINGO-OOPHORECTOMY USING DA JESSICA XI Bilateral 11/15/2022    Procedure: XI ROBOTIC SALPINGO-OOPHORECTOMY;  Surgeon: Hugo Bradford MD;  Location: [facility] OR;  Service: OB/GYN;  Laterality: Bilateral;    SHOULDER SURGERY Right 2017    TUBAL LIGATION  2/24/1979       Current Outpatient Medications   Medication Sig    acetaminophen (TYLENOL) 650 MG TbSR Take 1 tablet (650 mg total) by mouth every 8 (eight) hours as needed (pain).    allopurinoL (ZYLOPRIM) 300 MG tablet Take 300 mg by mouth.    b complex vitamins tablet Take 1 tablet by mouth once daily.    cetirizine (ZYRTEC) 10 MG tablet Take 10 mg by mouth once daily.    cholecalciferol, vitamin D3, 1,250 mcg (50,000 unit) capsule Take 50,000 Units by mouth every 7 days.    clopidogreL (PLAVIX) 75 mg tablet Take 75 mg by mouth once daily.    colchicine (COLCRYS) 0.6 mg tablet Take 0.6 mg by mouth.    EUTHYROX 75 mcg tablet Take 75 mcg by mouth once daily.    folic acid (FOLVITE) 1 MG tablet Take 1,000 mcg by mouth once daily.    furosemide (LASIX) 20 MG tablet Take 20 mg by mouth 2 (two) times daily.    hydroCHLOROthiazide (HYDRODIURIL) 25 MG tablet Take 25 mg by mouth once daily.    losartan (COZAAR) 50 MG tablet Take 50 mg by mouth once daily.    magnesium oxide-Mg AA chelate (MG-PLUS-PROTEIN) 133 mg Tab Take by mouth.    multivitamin capsule Take 1 capsule by mouth once daily.    omega-3 fatty acids/fish oil (FISH OIL-OMEGA-3 FATTY ACIDS) 300-1,000 mg capsule Take 1 capsule by mouth once daily.    ondansetron (ZOFRAN-ODT) 4 MG TbDL Take 4 mg by mouth every 8 (eight) hours as needed.    oxybutynin (DITROPAN-XL) 10 MG 24 hr tablet Take 10 mg by mouth.    pantoprazole (PROTONIX) 40 MG tablet Take 40 mg by mouth 2 (two) times daily.    potassium  chloride (K-TAB) 20 mEq Take 20 mEq by mouth once daily.    traMADoL (ULTRAM) 50 mg tablet Take 1 tablet (50 mg total) by mouth every 6 (six) hours as needed.     No current facility-administered medications for this visit.       Review of patient's allergies indicates:   Allergen Reactions    Cefazolin Rash     Other reaction(s): BLISTERS    Iodine Nausea And Vomiting and Rash      / CAN NOT EAT SHELLFISH CAN NOT USE BETADINE    Ibuprofen Rash     Almost all NSAIDS - CAN TAKE TYLENOL AND TORADOL    Adhesive tape-silicones      Other Reaction(s): pulls skin    paper tape allergy    Aspirin      Other reaction(s): Rectal bleeding, Stomach pain    Chocolate flavor     Propoxyphene Hallucinations    Shellfish containing products     Statins-hmg-coa reductase inhibitors      Other Reaction(s): body aches, stomach pain    Sulfa (sulfonamide antibiotics)        Family History   Problem Relation Name Age of Onset    Arthritis Mother Ema Kenny     Cancer Father Eliseo' and mother Ema     Diabetes Father Eliseo' and mother Ema     Hypertension Father Marciano and mother Ema     Alcohol abuse Brother Vargas        Social History     Socioeconomic History    Marital status:    Tobacco Use    Smoking status: Never    Smokeless tobacco: Never    Tobacco comments:     Experimented in 20's   Substance and Sexual Activity    Alcohol use: Never    Drug use: Never    Sexual activity: Not Currently     Partners: Male     Birth control/protection: I.U.D., Spermicide     Social Drivers of Health     Financial Resource Strain: Low Risk  (11/16/2022)    Overall Financial Resource Strain (CARDIA)     Difficulty of Paying Living Expenses: Not hard at all   Food Insecurity: No Food Insecurity (11/16/2022)    Hunger Vital Sign     Worried About Running Out of Food in the Last Year: Never true     Ran Out of Food in the Last Year: Never true   Transportation Needs: No Transportation Needs (11/16/2022)    PRAPARE -  "Transportation     Lack of Transportation (Medical): No     Lack of Transportation (Non-Medical): No   Physical Activity: Inactive (11/16/2022)    Exercise Vital Sign     Days of Exercise per Week: 0 days     Minutes of Exercise per Session: 0 min   Stress: No Stress Concern Present (11/16/2022)    [employer] [place] [employer] - Occupational Stress Questionnaire     Feeling of Stress : Only a little   Housing Stability: Low Risk  (11/16/2022)    Housing Stability Vital Sign     Unable to Pay for Housing in the Last Year: No     Number of Places Lived in the Last Year: 1     Unstable Housing in the Last Year: No           Review of Systems:  All review of systems negative except for those stated in the HPI.    Examination:    Vital Signs:    Vitals:    11/12/24 1035   BP: 135/80   Pulse: 77   Weight: 115.2 kg (254 lb)   Height: 5' 5" (1.651 m)         Body mass index is 42.27 kg/m².    Physical Examination:  General: Well-developed, well-nourished.  Neuro: Alert and oriented x 3.  Psych: Normal mood and affect.  Card: Regular rate and rhythm  Resp: Respirations regular and unlabored  Left Shoulder Exam:  No obvious deformity. No medial or lateral scapula winging.  Forward flexion to 120 degrees.  Abduction to 120 degrees.  External and internal rotation to 50 degrees.  4/5 strength, normal skin appearance and palpable pulses distally. Sensibility normal.      Imaging:  Three views of the left shoulder demonstrated advanced degenerative changes with bone-on-bone articulation of the glenohumeral joint.  There is also acetabularization of the acromion and osteophyte formation.    Assessment: Left rotator cuff tear arthropathy    History of pulmonary embolism    Chronic anticoagulation    Morbid obesity      Plan:  The patient is ready to proceed with surgical intervention via left reverse total shoulder arthroplasty due to failure to respond to exhaustive conservative treatments.  We will proceed with " obtaining preoperative evaluation and recommendations on holding her Plavix from her cardiologist.  She will continue over-the-counter medications as needed for pain.  Avoid anti-inflammatories secondary to chronic anticoagulation with Plavix.  She will return to clinic for possible preoperative evaluation pending cardiac clearances.  She verbalized understanding of the plan of care with no further questions.    Fili Jones MD personally performed the services described in this documentation, including but not limited to patient's history, physical examination, and assessment and plan of care. All medical record entries made by YASEMIN Mcfarlane were performed at his direction and in his presence. The medical record was reviewed and is accurate and complete.         Follow up for Possible preoperative evaluation.      DISCLAIMER: This note may have been dictated using voice recognition software and may contain grammatical errors.     NOTE: Consult report sent to referring provider via EP[employer] EMR.

## 2024-11-25 ENCOUNTER — TELEPHONE (OUTPATIENT)
Dept: ORTHOPEDICS | Facility: CLINIC | Age: 84
End: 2024-11-25
Payer: MEDICARE

## 2024-11-25 NOTE — TELEPHONE ENCOUNTER
Correction. Patient will have angiogram on 12/6/24 and will let us know if cleared after that test. Appointment cancelled for Pre-op.

## 2024-11-25 NOTE — TELEPHONE ENCOUNTER
Cardiologist clearance received. Attempted to contact patient to schedule pre-op appointment. Patient did not answer. I left detailed voicemail with contact number.    Scheduled Pre-op on Dec 3 at 10:15am. Will okay with patient.

## 2024-12-03 ENCOUNTER — PATIENT MESSAGE (OUTPATIENT)
Dept: ADMINISTRATIVE | Facility: OTHER | Age: 84
End: 2024-12-03
Payer: MEDICARE

## 2024-12-05 ENCOUNTER — HOSPITAL ENCOUNTER (OUTPATIENT)
Facility: HOSPITAL | Age: 84
Discharge: HOME | End: 2024-12-05
Attending: INTERNAL MEDICINE | Admitting: INTERNAL MEDICINE
Payer: MEDICARE

## 2024-12-05 VITALS
HEIGHT: 65 IN | OXYGEN SATURATION: 74 % | HEART RATE: 74 BPM | TEMPERATURE: 98 F | SYSTOLIC BLOOD PRESSURE: 143 MMHG | BODY MASS INDEX: 42.9 KG/M2 | WEIGHT: 257.5 LBS | DIASTOLIC BLOOD PRESSURE: 80 MMHG | RESPIRATION RATE: 18 BRPM

## 2024-12-05 DIAGNOSIS — R94.8 ABNORMAL PET SCAN, MEDIASTINUM: ICD-10-CM

## 2024-12-05 DIAGNOSIS — R07.9 CHEST PAIN: ICD-10-CM

## 2024-12-05 PROCEDURE — C1769 GUIDE WIRE: HCPCS | Performed by: INTERNAL MEDICINE

## 2024-12-05 PROCEDURE — 99153 MOD SED SAME PHYS/QHP EA: CPT | Performed by: INTERNAL MEDICINE

## 2024-12-05 PROCEDURE — 63600175 PHARM REV CODE 636 W HCPCS: Performed by: INTERNAL MEDICINE

## 2024-12-05 PROCEDURE — C1894 INTRO/SHEATH, NON-LASER: HCPCS | Performed by: INTERNAL MEDICINE

## 2024-12-05 PROCEDURE — 99152 MOD SED SAME PHYS/QHP 5/>YRS: CPT | Performed by: INTERNAL MEDICINE

## 2024-12-05 PROCEDURE — 25500020 PHARM REV CODE 255: Performed by: INTERNAL MEDICINE

## 2024-12-05 PROCEDURE — 27201423 OPTIME MED/SURG SUP & DEVICES STERILE SUPPLY: Performed by: INTERNAL MEDICINE

## 2024-12-05 PROCEDURE — C1887 CATHETER, GUIDING: HCPCS | Performed by: INTERNAL MEDICINE

## 2024-12-05 PROCEDURE — 93458 L HRT ARTERY/VENTRICLE ANGIO: CPT | Performed by: INTERNAL MEDICINE

## 2024-12-05 PROCEDURE — 25000003 PHARM REV CODE 250: Performed by: INTERNAL MEDICINE

## 2024-12-05 RX ORDER — NITROGLYCERIN 0.4 MG/1
0.4 TABLET SUBLINGUAL EVERY 5 MIN PRN
COMMUNITY

## 2024-12-05 RX ORDER — CALCIUM CARBONATE 500(1250)
2 TABLET,CHEWABLE ORAL DAILY PRN
COMMUNITY

## 2024-12-05 RX ORDER — LIDOCAINE HYDROCHLORIDE 10 MG/ML
INJECTION, SOLUTION INFILTRATION; PERINEURAL
Status: DISCONTINUED | OUTPATIENT
Start: 2024-12-05 | End: 2024-12-05 | Stop reason: HOSPADM

## 2024-12-05 RX ORDER — DIPHENHYDRAMINE HCL 25 MG
50 CAPSULE ORAL
Status: DISCONTINUED | OUTPATIENT
Start: 2024-12-05 | End: 2024-12-05 | Stop reason: HOSPADM

## 2024-12-05 RX ORDER — FENTANYL CITRATE 50 UG/ML
INJECTION, SOLUTION INTRAMUSCULAR; INTRAVENOUS
Status: DISCONTINUED | OUTPATIENT
Start: 2024-12-05 | End: 2024-12-05 | Stop reason: HOSPADM

## 2024-12-05 RX ORDER — HEPARIN SODIUM 1000 [USP'U]/ML
INJECTION, SOLUTION INTRAVENOUS; SUBCUTANEOUS
Status: DISCONTINUED | OUTPATIENT
Start: 2024-12-05 | End: 2024-12-05 | Stop reason: HOSPADM

## 2024-12-05 RX ORDER — POTASSIUM CHLORIDE 750 MG/1
10 CAPSULE, EXTENDED RELEASE ORAL DAILY
COMMUNITY

## 2024-12-05 RX ORDER — CIMETIDINE 300 MG/1
300 TABLET, FILM COATED ORAL
COMMUNITY
Start: 2024-11-22

## 2024-12-05 RX ORDER — IOPAMIDOL 755 MG/ML
INJECTION, SOLUTION INTRAVASCULAR
Status: DISCONTINUED | OUTPATIENT
Start: 2024-12-05 | End: 2024-12-05 | Stop reason: HOSPADM

## 2024-12-05 RX ORDER — MORPHINE SULFATE 4 MG/ML
2 INJECTION, SOLUTION INTRAMUSCULAR; INTRAVENOUS EVERY 4 HOURS PRN
Status: DISCONTINUED | OUTPATIENT
Start: 2024-12-05 | End: 2024-12-05 | Stop reason: HOSPADM

## 2024-12-05 RX ORDER — VERAPAMIL HYDROCHLORIDE 2.5 MG/ML
INJECTION, SOLUTION INTRAVENOUS
Status: DISCONTINUED | OUTPATIENT
Start: 2024-12-05 | End: 2024-12-05 | Stop reason: HOSPADM

## 2024-12-05 RX ORDER — ASCORBIC ACID 500 MG
500 TABLET ORAL DAILY
COMMUNITY

## 2024-12-05 RX ORDER — METHYLPREDNISOLONE 16 MG/1
32 TABLET ORAL 2 TIMES DAILY
COMMUNITY
Start: 2024-11-22

## 2024-12-05 RX ORDER — DIPHENHYDRAMINE HCL 50 MG
50 CAPSULE ORAL
COMMUNITY

## 2024-12-05 RX ORDER — HYDRALAZINE HYDROCHLORIDE 20 MG/ML
10 INJECTION INTRAMUSCULAR; INTRAVENOUS EVERY 4 HOURS PRN
Status: DISCONTINUED | OUTPATIENT
Start: 2024-12-05 | End: 2024-12-05 | Stop reason: HOSPADM

## 2024-12-05 RX ORDER — HYDROCODONE BITARTRATE AND ACETAMINOPHEN 5; 325 MG/1; MG/1
1 TABLET ORAL EVERY 4 HOURS PRN
Status: DISCONTINUED | OUTPATIENT
Start: 2024-12-05 | End: 2024-12-05 | Stop reason: HOSPADM

## 2024-12-05 RX ORDER — MIDAZOLAM HYDROCHLORIDE 1 MG/ML
INJECTION INTRAMUSCULAR; INTRAVENOUS
Status: DISCONTINUED | OUTPATIENT
Start: 2024-12-05 | End: 2024-12-05 | Stop reason: HOSPADM

## 2024-12-05 RX ORDER — SODIUM CHLORIDE 9 MG/ML
INJECTION, SOLUTION INTRAVENOUS CONTINUOUS
Status: DISCONTINUED | OUTPATIENT
Start: 2024-12-05 | End: 2024-12-05 | Stop reason: HOSPADM

## 2024-12-05 RX ORDER — ONDANSETRON HYDROCHLORIDE 2 MG/ML
4 INJECTION, SOLUTION INTRAVENOUS EVERY 8 HOURS PRN
Status: DISCONTINUED | OUTPATIENT
Start: 2024-12-05 | End: 2024-12-05 | Stop reason: HOSPADM

## 2024-12-05 RX ORDER — DIAZEPAM 5 MG/1
10 TABLET ORAL
Status: DISCONTINUED | OUTPATIENT
Start: 2024-12-05 | End: 2024-12-05 | Stop reason: HOSPADM

## 2024-12-05 RX ORDER — ACETAMINOPHEN 325 MG/1
650 TABLET ORAL EVERY 4 HOURS PRN
Status: DISCONTINUED | OUTPATIENT
Start: 2024-12-05 | End: 2024-12-05 | Stop reason: HOSPADM

## 2024-12-05 RX ORDER — NITROGLYCERIN 20 MG/100ML
INJECTION INTRAVENOUS
Status: DISCONTINUED | OUTPATIENT
Start: 2024-12-05 | End: 2024-12-05 | Stop reason: HOSPADM

## 2024-12-05 RX ADMIN — DIAZEPAM 10 MG: 5 TABLET ORAL at 07:12

## 2024-12-05 NOTE — NURSING
Pt discharged home in stable condition. Dressing to right wrist in place, clean dry and intact. Armboard in place. Educated patient and family with discharge instructions and copy provided. Family and pt verbalized understanding. No concerns voiced.

## 2024-12-05 NOTE — DISCHARGE SUMMARY
[facility] - Cath Lab Services  Discharge Note  Short Stay    Procedure(s) (LRB):  Angiogram, Coronary, with Left Heart Cath (N/A)      OUTCOME: Patient tolerated treatment/procedure well without complication and is now ready for discharge.    DISPOSITION: Home or Self Care    FINAL DIAGNOSIS:  abnormal lexipet    FOLLOWUP: In clinic    DISCHARGE INSTRUCTIONS:  No discharge procedures on file.     TIME SPENT ON DISCHARGE: 31 minutes

## 2024-12-05 NOTE — INTERVAL H&P NOTE
Patient name: Adriana Murphy  MRN: [id number]  : 1940  Cath Lab Procedure H&P Update    Pre-Procedure Assessment:    I saw and examined the patient face to face. The patient has been re-evaluated and her condition is unchanged. The reason for admission, procedure and care is still present.  Based on the patients H&P, pre-procedure physical exam, relevant diagnostic studies, NPO status and information obtained from the patient, I determine the patient is an appropriate candidate for the proposed procedure and anesthesia planned. I further certify the anesthesia risks, benefits and options have been explained to the patient to which she agrees as documented on the procedural consent.

## 2024-12-05 NOTE — Clinical Note
The catheter was inserted into the, was removed from the and was inserted over the wire into the ostium   right coronary artery. Hemodynamics were performed.  An angiography was performed of the aorta. Multiple views were taken. The angiography was performed via power injection.  The catheter was unable to engage the area..

## 2024-12-05 NOTE — Clinical Note
The catheter was inserted into the, was removed from the and was inserted over the wire into the ostium   right coronary artery. Hemodynamics were performed.  An angiography was performed of the right coronary arteries. Multiple views were taken. The angiography was performed via power injection.  The catheter was unable to engage the area..

## 2024-12-05 NOTE — Clinical Note
The catheter was removed from the and was repositioned into the ostium   left main. Hemodynamics were performed.  An angiography was performed of the aorta. Multiple views were taken. The angiography was performed via power injection.  The catheter was unable to engage the area..

## 2024-12-05 NOTE — INTERVAL H&P NOTE
Patient name: Adriana Murphy  MRN: [id number]  : 1940  Cath Lab Procedure H&P Update    Pre-Procedure Assessment:    I saw and examined the patient face to face. The patient has been re-evaluated and her condition is unchanged. The reason for admission, procedure and care is still present.  Based on the patients H&P, pre-procedure physical exam, relevant diagnostic studies, NPO status and information obtained from the patient, I determine the patient is an appropriate candidate for the proposed procedure and anesthesia planned. I further certify the anesthesia risks, benefits and options have been explained to the patient to which she agrees as documented on the procedural consent.

## 2024-12-05 NOTE — Clinical Note
The catheter was inserted into the and was inserted over the wire into the left ventricle. Hemodynamics were performed.  and Pullback was recorded.  An angiography was performed of the LV. The angiography was performed via power injection.   60%. EDP 11

## 2024-12-08 ENCOUNTER — PATIENT MESSAGE (OUTPATIENT)
Dept: ADMINISTRATIVE | Facility: OTHER | Age: 84
End: 2024-12-08
Payer: MEDICARE

## 2025-01-13 ENCOUNTER — HOSPITAL ENCOUNTER (EMERGENCY)
Facility: HOSPITAL | Age: 85
Discharge: HOME | End: 2025-01-13
Attending: EMERGENCY MEDICINE
Payer: MEDICARE

## 2025-01-13 VITALS
TEMPERATURE: 98 F | OXYGEN SATURATION: 95 % | HEART RATE: 69 BPM | WEIGHT: 257 LBS | SYSTOLIC BLOOD PRESSURE: 161 MMHG | RESPIRATION RATE: 20 BRPM | DIASTOLIC BLOOD PRESSURE: 85 MMHG | BODY MASS INDEX: 42.82 KG/M2 | HEIGHT: 65 IN

## 2025-01-13 DIAGNOSIS — M54.6 ACUTE RIGHT-SIDED THORACIC BACK PAIN: Primary | ICD-10-CM

## 2025-01-13 DIAGNOSIS — R07.9 CHEST PAIN: ICD-10-CM

## 2025-01-13 LAB
ALBUMIN SERPL-MCNC: 3 G/DL (ref 3.4–4.8)
ALBUMIN/GLOB SERPL: 0.7 RATIO (ref 1.1–2)
ALP SERPL-CCNC: 118 UNIT/L (ref 40–150)
ALT SERPL-CCNC: 11 UNIT/L (ref 0–55)
ANION GAP SERPL CALC-SCNC: 7 MEQ/L
AST SERPL-CCNC: 15 UNIT/L (ref 5–34)
BASOPHILS # BLD AUTO: 0.01 X10(3)/MCL
BASOPHILS NFR BLD AUTO: 0.2 %
BILIRUB SERPL-MCNC: 0.5 MG/DL
BNP BLD-MCNC: 48.9 PG/ML
BUN SERPL-MCNC: 11 MG/DL (ref 9.8–20.1)
CALCIUM SERPL-MCNC: 9.3 MG/DL (ref 8.4–10.2)
CHLORIDE SERPL-SCNC: 103 MMOL/L (ref 98–107)
CO2 SERPL-SCNC: 28 MMOL/L (ref 23–31)
CREAT SERPL-MCNC: 0.84 MG/DL (ref 0.55–1.02)
CREAT/UREA NIT SERPL: 13
EOSINOPHIL # BLD AUTO: 0.08 X10(3)/MCL (ref 0–0.9)
EOSINOPHIL NFR BLD AUTO: 1.7 %
ERYTHROCYTE [DISTWIDTH] IN BLOOD BY AUTOMATED COUNT: 14.4 % (ref 11.5–17)
GFR SERPLBLD CREATININE-BSD FMLA CKD-EPI: >60 ML/MIN/1.73/M2
GLOBULIN SER-MCNC: 4.1 GM/DL (ref 2.4–3.5)
GLUCOSE SERPL-MCNC: 116 MG/DL (ref 82–115)
HCT VFR BLD AUTO: 36.2 % (ref 37–47)
HGB BLD-MCNC: 11.4 G/DL (ref 12–16)
IMM GRANULOCYTES # BLD AUTO: 0.01 X10(3)/MCL (ref 0–0.04)
IMM GRANULOCYTES NFR BLD AUTO: 0.2 %
INR PPP: 1.1
LYMPHOCYTES # BLD AUTO: 1.7 X10(3)/MCL (ref 0.6–4.6)
LYMPHOCYTES NFR BLD AUTO: 36.1 %
MCH RBC QN AUTO: 27.9 PG (ref 27–31)
MCHC RBC AUTO-ENTMCNC: 31.5 G/DL (ref 33–36)
MCV RBC AUTO: 88.7 FL (ref 80–94)
MONOCYTES # BLD AUTO: 0.62 X10(3)/MCL (ref 0.1–1.3)
MONOCYTES NFR BLD AUTO: 13.2 %
NEUTROPHILS # BLD AUTO: 2.29 X10(3)/MCL (ref 2.1–9.2)
NEUTROPHILS NFR BLD AUTO: 48.6 %
NRBC BLD AUTO-RTO: 0 %
OHS QRS DURATION: 76 MS
OHS QTC CALCULATION: 445 MS
PLATELET # BLD AUTO: 233 X10(3)/MCL (ref 130–400)
PMV BLD AUTO: 9.2 FL (ref 7.4–10.4)
POTASSIUM SERPL-SCNC: 3.5 MMOL/L (ref 3.5–5.1)
PROT SERPL-MCNC: 7.1 GM/DL (ref 5.8–7.6)
PROTHROMBIN TIME: 14 SECONDS (ref 12.5–14.5)
RBC # BLD AUTO: 4.08 X10(6)/MCL (ref 4.2–5.4)
SODIUM SERPL-SCNC: 138 MMOL/L (ref 136–145)
TROPONIN I SERPL-MCNC: <0.01 NG/ML (ref 0–0.04)
WBC # BLD AUTO: 4.71 X10(3)/MCL (ref 4.5–11.5)

## 2025-01-13 PROCEDURE — 96374 THER/PROPH/DIAG INJ IV PUSH: CPT

## 2025-01-13 PROCEDURE — 80053 COMPREHEN METABOLIC PANEL: CPT | Performed by: EMERGENCY MEDICINE

## 2025-01-13 PROCEDURE — 93005 ELECTROCARDIOGRAM TRACING: CPT

## 2025-01-13 PROCEDURE — 63600175 PHARM REV CODE 636 W HCPCS: Performed by: EMERGENCY MEDICINE

## 2025-01-13 PROCEDURE — 85610 PROTHROMBIN TIME: CPT | Performed by: EMERGENCY MEDICINE

## 2025-01-13 PROCEDURE — 99285 EMERGENCY DEPT VISIT HI MDM: CPT | Mod: 25

## 2025-01-13 PROCEDURE — 85025 COMPLETE CBC W/AUTO DIFF WBC: CPT | Performed by: EMERGENCY MEDICINE

## 2025-01-13 PROCEDURE — 83880 ASSAY OF NATRIURETIC PEPTIDE: CPT | Performed by: EMERGENCY MEDICINE

## 2025-01-13 PROCEDURE — 84484 ASSAY OF TROPONIN QUANT: CPT | Performed by: EMERGENCY MEDICINE

## 2025-01-13 PROCEDURE — 93010 ELECTROCARDIOGRAM REPORT: CPT | Mod: ,,, | Performed by: STUDENT IN AN ORGANIZED HEALTH CARE EDUCATION/TRAINING PROGRAM

## 2025-01-13 RX ORDER — DROPERIDOL 2.5 MG/ML
2.5 INJECTION, SOLUTION INTRAMUSCULAR; INTRAVENOUS ONCE
Status: COMPLETED | OUTPATIENT
Start: 2025-01-13 | End: 2025-01-13

## 2025-01-13 RX ORDER — METHOCARBAMOL 750 MG/1
750 TABLET, FILM COATED ORAL EVERY 8 HOURS PRN
Qty: 18 TABLET | Refills: 0 | Status: SHIPPED | OUTPATIENT
Start: 2025-01-13

## 2025-01-13 RX ADMIN — DROPERIDOL 2.5 MG: 2.5 INJECTION, SOLUTION INTRAMUSCULAR; INTRAVENOUS at 05:01

## 2025-01-13 NOTE — ED PROVIDER NOTES
Encounter Date: 1/13/2025       History     Chief Complaint   Patient presents with    Back Pain     Pt c/o SOB and R upper back/shoulder pain waking pt up from sleep. Pt states has had the R shoulder pain since Sunday. Denies any falls or trauma. Denies any chest pain.      Patient is a an 84-year-old female presenting with complain of pain to the right upper back area started last night.  Patient states she is not hurting in the chest.  She states she is mildly short of breath and feels anxious.  No fever or chills.  Patient denies abdominal pain, nausea, or vomiting.  Patient states pain increases with certain movements.  Patient denies trauma or falls.      Review of patient's allergies indicates:   Allergen Reactions    Cefazolin Rash     Other reaction(s): BLISTERS    Iodine Nausea And Vomiting and Rash      / CAN NOT EAT SHELLFISH CAN NOT USE BETADINE    Ibuprofen Rash     Almost all NSAIDS - CAN TAKE TYLENOL AND TORADOL    Adhesive tape-silicones      Other Reaction(s): pulls skin    paper tape allergy    Aspirin      Other reaction(s): Rectal bleeding, Stomach pain    Chocolate flavor     Propoxyphene Hallucinations    Shellfish containing products     Statins-hmg-coa reductase inhibitors      Other Reaction(s): body aches, stomach pain    Sulfa (sulfonamide antibiotics)      Past Medical History:   Diagnosis Date    Anticoagulant long-term use     Arthritis     Asthma     DVT (deep vein thrombosis) in pregnancy     GERD (gastroesophageal reflux disease)     Hypertension     PAD (peripheral artery disease)     Post-menopausal bleeding     Pulmonary embolus     Thyroid disease     HYPO     Past Surgical History:   Procedure Laterality Date    ANGIOGRAM, CORONARY, WITH LEFT HEART CATHETERIZATION N/A 12/5/2024    Procedure: Angiogram, Coronary, with Left Heart Cath;  Surgeon: Rafael Gonzales MD;  Location: [facility] CATH LAB;  Service: Cardiology;  Laterality: N/A;  C VIA RRA    APPENDECTOMY      btl       CATARACT EXTRACTION Bilateral     CHOLECYSTECTOMY      DILATION AND CURETTAGE OF UTERUS      EXAMINATION UNDER ANESTHESIA N/A 12/20/2022    Procedure: EXAM UNDER ANESTHESIA;  Surgeon: Vargas Frias MD;  Location: [facility] OR;  Service: OB/GYN;  Laterality: N/A;    EYE SURGERY      HYSTERECTOMY  11-    ivc filter      RE-EXPLORATION FOR BLEEDING N/A 12/20/2022    Procedure: RE-EXPLORATION, FOR BLEEDING;  Surgeon: Vargas Frias MD;  Location: [facility] OR;  Service: OB/GYN;  Laterality: N/A;    REPAIR OF VAGINAL CUFF  12/20/2022    Procedure: REPAIR, VAGINAL CUFF;  Surgeon: Vargas Frias MD;  Location: [facility] OR;  Service: OB/GYN;;    ROBOT-ASSISTED LAPAROSCOPIC ABDOMINAL HYSTERECTOMY USING DA JESSICA XI N/A 11/15/2022    Procedure: XI ROBOTIC HYSTERECTOMY;  Surgeon: Hugo Bradford MD;  Location: [facility] OR;  Service: OB/GYN;  Laterality: N/A;    ROBOT-ASSISTED LAPAROSCOPIC SALPINGO-OOPHORECTOMY USING DA JESSICA XI Bilateral 11/15/2022    Procedure: XI ROBOTIC SALPINGO-OOPHORECTOMY;  Surgeon: Hugo Bradford MD;  Location: [facility] OR;  Service: OB/GYN;  Laterality: Bilateral;    SHOULDER SURGERY Right 2017    TUBAL LIGATION  2/24/1979     Family History   Problem Relation Name Age of Onset    Arthritis Mother Ema Kenny     Cancer Father Eliseo' and mother Ema     Diabetes Father Eliseo' and mother Ema     Hypertension Father Eliseo' and mother Ema     Alcohol abuse Brother Vargas      Social History     Tobacco Use    Smoking status: Never    Smokeless tobacco: Never    Tobacco comments:     Experimented in 20's   Substance Use Topics    Alcohol use: Never    Drug use: Never     Review of Systems   Constitutional: Negative.    HENT: Negative.     Respiratory:  Positive for shortness of breath. Negative for cough.    Cardiovascular: Negative.    Gastrointestinal: Negative.    Genitourinary: Negative.    Musculoskeletal:  Positive for back pain. Negative for joint swelling and neck pain.    Neurological: Negative.    Psychiatric/Behavioral:  The patient is nervous/anxious.        Physical Exam     Initial Vitals [01/13/25 0415]   BP Pulse Resp Temp SpO2   (!) 168/94 92 20 98.2 °F (36.8 °C) 100 %      MAP       --         Physical Exam    Nursing note and vitals reviewed.  Constitutional: She appears well-developed and well-nourished.   HENT:   Head: Normocephalic and atraumatic.   Neck: Neck supple.   Normal range of motion.  Cardiovascular:  Normal rate, regular rhythm and normal heart sounds.           Pulmonary/Chest: Breath sounds normal. No respiratory distress. She has no wheezes. She has no rhonchi. She has no rales.   Abdominal: Abdomen is soft. There is no abdominal tenderness. There is no guarding.   Musculoskeletal:         General: Normal range of motion.      Cervical back: Normal range of motion and neck supple.     Neurological: She is alert and oriented to person, place, and time. She has normal strength.   Skin: Skin is warm.   Psychiatric: She has a normal mood and affect. Thought content normal.         ED Course   Procedures  Labs Reviewed   COMPREHENSIVE METABOLIC PANEL - Abnormal       Result Value    Sodium 138      Potassium 3.5      Chloride 103      CO2 28      Glucose 116 (*)     Blood Urea Nitrogen 11.0      Creatinine 0.84      Calcium 9.3      Protein Total 7.1      Albumin 3.0 (*)     Globulin 4.1 (*)     Albumin/Globulin Ratio 0.7 (*)     Bilirubin Total 0.5            ALT 11      AST 15      eGFR >60      Anion Gap 7.0      BUN/Creatinine Ratio 13     CBC WITH DIFFERENTIAL - Abnormal    WBC 4.71      RBC 4.08 (*)     Hgb 11.4 (*)     Hct 36.2 (*)     MCV 88.7      MCH 27.9      MCHC 31.5 (*)     RDW 14.4      Platelet 233      MPV 9.2      Neut % 48.6      Lymph % 36.1      Mono % 13.2      Eos % 1.7      Basophil % 0.2      Imm Grans % 0.2      Neut # 2.29      Lymph # 1.70      Mono # 0.62      Eos # 0.08      Baso # 0.01      Imm Gran # 0.01      NRBC% 0.0      B-TYPE NATRIURETIC PEPTIDE - Normal    Natriuretic Peptide 48.9     TROPONIN I - Normal    Troponin-I <0.010     PROTIME-INR - Normal    PT 14.0      INR 1.1     CBC W/ AUTO DIFFERENTIAL    Narrative:     The following orders were created for panel order CBC auto differential.  Procedure                               Abnormality         Status                     ---------                               -----------         ------                     CBC with Differential[[id number]]       Abnormal            Final result                 Please view results for these tests on the individual orders.     EKG Readings: (Independently Interpreted)   Initial Reading: No STEMI. Rhythm: Normal Sinus Rhythm. Heart Rate: 77. Ectopy: No Ectopy. Conduction: Normal. ST Segments: Normal ST Segments. T Waves: Normal. Axis: Normal.       Imaging Results              X-Ray Chest AP Portable (Final result)  Result time 01/13/25 06:13:14      Final result by Luiz Drake MD (01/13/25 06:13:14)                   Narrative:    EXAMINATION  XR CHEST AP PORTABLE    CLINICAL HISTORY  Chest Pain;    TECHNIQUE  A total of 1 frontal image(s) of the chest.    COMPARISON  2 August 2018    FINDINGS  Lines/tubes/devices: ECG leads overlie the imaged region.    The cardiomediastinal silhouette and central pulmonary vasculature are unremarkable for utilized technique.  The trachea is midline. There is no lobar consolidation, pleural effusion, or pneumothorax.    There is no acute osseous or extrathoracic abnormality.  Similar appearance of regional degenerative bony changes and right shoulder arthroplasty hardware.    IMPRESSION  1. No acute radiographic process.  2. Chronic secondary details discussed above.      Electronically signed by: Luiz Drake  Date:    01/13/2025  Time:    06:13                                     Medications   droPERidol injection 2.5 mg (2.5 mg Intravenous Given 1/13/25 0529)     Medical Decision  Making  Differential diagnosis: Thoracic back pain, anxiety, acute coronary syndrome, pneumonia    Amount and/or Complexity of Data Reviewed  Labs: ordered.     Details: Labs are all stable  Radiology: ordered. Decision-making details documented in ED Course.  Discussion of management or test interpretation with external provider(s): Troponin is within normal, EKG shows no acute changes.  Will DC home with prescription for Robaxin    Risk  Prescription drug management.               ED Course as of 01/13/25 0708   Mon Jan 13, 2025   0706 Patient remains in no apparent distress.  Will DC to home [KG]      ED Course User Index  [KG] Linden Sosa MD                           Clinical Impression:  Final diagnoses:  [R07.9] Chest pain  [M54.6] Acute right-sided thoracic back pain (Primary)          ED Disposition Condition    Discharge Stable          ED Prescriptions       Medication Sig Dispense Start Date End Date Auth. Provider    methocarbamoL (ROBAXIN) 750 MG Tab Take 1 tablet (750 mg total) by mouth every 8 (eight) hours as needed (Pain). 18 tablet 1/13/2025 -- Linden Sosa MD          Follow-up Information       Follow up With Specialties Details Why Contact Info    Jelani Birmingham MD Family Medicine In 2 days  [address] [facility]  [address]  [facility] [address]  [phone]               Linden Sosa MD  01/13/25 4800

## 2025-03-17 ENCOUNTER — HOSPITAL ENCOUNTER (OUTPATIENT)
Dept: RADIOLOGY | Facility: HOSPITAL | Age: 85
Discharge: HOME | End: 2025-03-17
Attending: FAMILY MEDICINE
Payer: MEDICARE

## 2025-03-17 DIAGNOSIS — Z12.31 ENCOUNTER FOR SCREENING MAMMOGRAM FOR BREAST CANCER: ICD-10-CM

## 2025-03-17 PROCEDURE — 77063 BREAST TOMOSYNTHESIS BI: CPT | Mod: 26,,, | Performed by: RADIOLOGY

## 2025-03-17 PROCEDURE — 77067 SCR MAMMO BI INCL CAD: CPT | Mod: TC

## 2025-03-17 PROCEDURE — 77067 SCR MAMMO BI INCL CAD: CPT | Mod: 26,,, | Performed by: RADIOLOGY

## 2025-05-05 ENCOUNTER — OFFICE VISIT (OUTPATIENT)
Dept: GYNECOLOGIC ONCOLOGY | Facility: CLINIC | Age: 85
End: 2025-05-05
Payer: MEDICARE

## 2025-05-05 VITALS
WEIGHT: 250.88 LBS | HEART RATE: 90 BPM | SYSTOLIC BLOOD PRESSURE: 135 MMHG | BODY MASS INDEX: 41.75 KG/M2 | DIASTOLIC BLOOD PRESSURE: 75 MMHG

## 2025-05-05 DIAGNOSIS — C54.1 ENDOMETRIAL CANCER: Primary | ICD-10-CM

## 2025-05-05 PROCEDURE — 99213 OFFICE O/P EST LOW 20 MIN: CPT | Mod: S$GLB,,,

## 2025-05-05 NOTE — PROGRESS NOTES
Subjective:      Patient ID: Adriana Murphy is a 85 y.o. female.    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 Frias 12/17/22  Extensive PVD with bilateral iliac stents and anticoagulation      HPI  Here today for continued f/u. Was supposed to see Dr. Bradford tomorrow but showed up today by accident. She denies VB.  Reports normal bladder and bowel.    Review of Systems   Constitutional:  Negative for activity change, appetite change, chills, fatigue and fever.   HENT:  Negative for hearing loss, mouth sores, nosebleeds, sore throat and tinnitus.    Eyes:  Negative for visual disturbance.   Respiratory:  Negative for cough, chest tightness, shortness of breath and wheezing.    Cardiovascular:  Negative for chest pain and leg swelling.   Gastrointestinal:  Negative for abdominal distention, abdominal pain, blood in stool, constipation, diarrhea, nausea and vomiting.   Genitourinary:  Negative for dysuria, flank pain, frequency, hematuria, pelvic pain, vaginal bleeding, vaginal discharge and vaginal pain.   Musculoskeletal:  Negative for arthralgias and back pain.   Skin:  Negative for rash.   Neurological:  Negative for dizziness, seizures, syncope, weakness and numbness.   Hematological:  Does not bruise/bleed easily.   Psychiatric/Behavioral:  Negative for confusion and sleep disturbance. The patient is not nervous/anxious.        Objective:   Physical Exam:   Constitutional: She is oriented to person, place, and time. She appears well-developed and well-nourished. No distress.    HENT:   Head: Normocephalic and atraumatic.    Eyes: No scleral icterus.     Cardiovascular:       Exam reveals no cyanosis and no edema.        Pulmonary/Chest: Effort normal. No respiratory distress. She exhibits no tenderness.        Abdominal: Soft. She exhibits no  distension, no fluid wave and no mass. There is no abdominal tenderness. There is no rebound and no guarding. No hernia.     Genitourinary:    Rectum normal.      Pelvic exam was performed with patient supine.     Labial bartholins normal.There is no rash, tenderness or lesion on the right labia. There is no rash, tenderness or lesion on the left labia. Right adnexum displays no mass, no tenderness and no fullness. Left adnexum displays no mass, no tenderness and no fullness. No vaginal discharge, bleeding or prolapse of vaginal walls in the vagina. Cervix is absent.Uterus is absent.    Genitourinary Comments: Patient has L hip issues and difficulties positioning herself on the bed. Had to turn speculum upside down to advance it b/c she could not scoot to the EOB.              Musculoskeletal: Normal range of motion and moves all extremeties. No edema.      Lymphadenopathy:     She has no cervical adenopathy.    Neurological: She is alert and oriented to person, place, and time.    Skin: Skin is warm and dry. No cyanosis. No pallor.    Psychiatric: She has a normal mood and affect. Her behavior is normal.       Assessment:     1. Endometrial cancer          Plan:   SATISH on exam.  RTC 6 months.

## 2025-06-09 ENCOUNTER — OFFICE VISIT (OUTPATIENT)
Dept: ORTHOPEDICS | Facility: CLINIC | Age: 85
End: 2025-06-09
Payer: MEDICARE

## 2025-06-09 ENCOUNTER — HOSPITAL ENCOUNTER (OUTPATIENT)
Dept: RADIOLOGY | Facility: CLINIC | Age: 85
Discharge: HOME | End: 2025-06-09
Attending: ORTHOPAEDIC SURGERY
Payer: MEDICARE

## 2025-06-09 VITALS — HEIGHT: 65 IN | WEIGHT: 250.88 LBS | BODY MASS INDEX: 41.8 KG/M2

## 2025-06-09 DIAGNOSIS — M25.562 PAIN IN BOTH KNEES, UNSPECIFIED CHRONICITY: Primary | ICD-10-CM

## 2025-06-09 DIAGNOSIS — M25.561 PAIN IN BOTH KNEES, UNSPECIFIED CHRONICITY: ICD-10-CM

## 2025-06-09 DIAGNOSIS — M25.561 PAIN IN BOTH KNEES, UNSPECIFIED CHRONICITY: Primary | ICD-10-CM

## 2025-06-09 DIAGNOSIS — M17.2 POST-TRAUMATIC OSTEOARTHRITIS OF BOTH KNEES: ICD-10-CM

## 2025-06-09 DIAGNOSIS — M25.562 PAIN IN BOTH KNEES, UNSPECIFIED CHRONICITY: ICD-10-CM

## 2025-06-09 PROCEDURE — 73564 X-RAY EXAM KNEE 4 OR MORE: CPT | Mod: 50,,, | Performed by: ORTHOPAEDIC SURGERY

## 2025-06-09 PROCEDURE — 20610 DRAIN/INJ JOINT/BURSA W/O US: CPT | Mod: 50,,, | Performed by: ORTHOPAEDIC SURGERY

## 2025-06-09 PROCEDURE — 99214 OFFICE O/P EST MOD 30 MIN: CPT | Mod: 25,,, | Performed by: ORTHOPAEDIC SURGERY

## 2025-06-09 RX ADMIN — METHYLPREDNISOLONE ACETATE 80 MG: 80 INJECTION, SUSPENSION INTRA-ARTICULAR; INTRALESIONAL; INTRAMUSCULAR; SOFT TISSUE at 10:06

## 2025-06-09 RX ADMIN — LIDOCAINE HYDROCHLORIDE 2 MG: 20 INJECTION, SOLUTION INFILTRATION; PERINEURAL at 10:06

## 2025-06-09 NOTE — PROGRESS NOTES
"Subjective:    CC: Pain of the Left Knee (Darrel knee pain - Pain started within the last 6-8 months. No previous surgery or injection of knees. Presents today using a walker. Started using the walker about 2 weeks ago. Describes as stabbing, locking, and catching. Left is worse. ) and Pain of the Right Knee       HPI:  Patient comes in today complaining of bilateral knee pain for the last 8 months.  She denies any trauma or specific injury, she has been using a walker for the last couple of weeks.    ROS: Refer to HPI for pertinent ROS. All other 12 point systems negative.    Objective:  Vitals:    06/09/25 1039   Weight: 113.8 kg (250 lb 14.1 oz)   Height: 5' 5" (1.651 m)        Physical Exam:  The patient is well-nourished, well-developed, in no apparent distress, pleasant and cooperative. Examination of the bilateral lower extremities,  compartments are soft and warm. Skin is intact. There are no signs or symptoms of DVT or infection. There is a small joint effusion. There is no erythema. Tenderness to palpation along the medial and lateral joint line, right knee range of motion is 0-90; left knee range of motion is 5-90. The knee is stable to stressing with varus and valgus. Negative anterior and posterior drawer.   Negative Lachman´s.  Negative Jaquelin's test. Patella grind is positive, negative for apprehension.  Patient is neurovascularly intact distally.      Images:  X-rays four views left and right knee demonstrates tricompartmental osteoarthritis. Images Reviewed and discussed with patient.    Assessment:  1. Pain in both knees, unspecified chronicity  - X-Ray Knee Complete 4 Or More Views Bilat; Future    2. Post-traumatic osteoarthritis of both knees  - Large Joint Aspiration/Injection: bilateral knee        Plan:  At this time we discussed her physical exam and x-ray findings.  We have discussed her underlying arthritis.  We have discussed various treatment options going forward.  She will continue " low-impact activities, knee exercises.  Under sterile technique she tolerated the steroid injection very well through the anterolateral portal of the left and right knee.  Like see back in 4 months if needed.    Follow UP: No follow-ups on file.    Large Joint Aspiration/Injection: bilateral knee    Date/Time: 6/9/2025 10:00 AM    Performed by: Albert Johnston MD  Authorized by: Albert Johnston MD    Consent Done?:  Yes (Verbal)  Indications:  Pain  Site marked: the procedure site was marked    Prep: patient was prepped and draped in usual sterile fashion    Approach:  Anterolateral  Location:  Knee  Laterality:  Bilateral  Site:  Bilateral knee  Medications (Right):  2 mg LIDOcaine HCL 20 mg/ml (2%) 20 mg/mL (2 %); 80 mg methylPREDNISolone acetate 80 mg/mL  Medications (Left):  2 mg LIDOcaine HCL 20 mg/ml (2%) 20 mg/mL (2 %); 80 mg methylPREDNISolone acetate 80 mg/mL  Patient tolerance:  Patient tolerated the procedure well with no immediate complications

## 2025-06-09 NOTE — PROCEDURES
Large Joint Aspiration/Injection: bilateral knee    Date/Time: 6/9/2025 10:00 AM    Performed by: Albert Johnston MD  Authorized by: Albert Johnston MD    Consent Done?:  Yes (Verbal)  Indications:  Pain  Site marked: the procedure site was marked    Prep: patient was prepped and draped in usual sterile fashion    Approach:  Anterolateral  Location:  Knee  Laterality:  Bilateral  Site:  Bilateral knee  Medications (Right):  2 mg LIDOcaine HCL 20 mg/ml (2%) 20 mg/mL (2 %); 80 mg methylPREDNISolone acetate 80 mg/mL  Medications (Left):  2 mg LIDOcaine HCL 20 mg/ml (2%) 20 mg/mL (2 %); 80 mg methylPREDNISolone acetate 80 mg/mL  Patient tolerance:  Patient tolerated the procedure well with no immediate complications

## 2025-06-12 RX ORDER — LIDOCAINE HYDROCHLORIDE 20 MG/ML
2 INJECTION, SOLUTION INFILTRATION; PERINEURAL
Status: DISCONTINUED | OUTPATIENT
Start: 2025-06-09 | End: 2025-06-12 | Stop reason: HOSPADM

## 2025-06-12 RX ORDER — METHYLPREDNISOLONE ACETATE 80 MG/ML
80 INJECTION, SUSPENSION INTRA-ARTICULAR; INTRALESIONAL; INTRAMUSCULAR; SOFT TISSUE
Status: DISCONTINUED | OUTPATIENT
Start: 2025-06-09 | End: 2025-06-12 | Stop reason: HOSPADM

## 2025-07-16 ENCOUNTER — TELEPHONE (OUTPATIENT)
Dept: ORTHOPEDICS | Facility: CLINIC | Age: 85
End: 2025-07-16
Payer: MEDICARE

## 2025-07-16 NOTE — TELEPHONE ENCOUNTER
Patient called with injections questions.     Returned her call. No answer. Left  for return call.

## 2025-08-05 ENCOUNTER — TELEPHONE (OUTPATIENT)
Dept: FAMILY MEDICINE | Facility: CLINIC | Age: 85
End: 2025-08-05
Payer: MEDICARE

## 2025-08-05 NOTE — TELEPHONE ENCOUNTER
Copied from CRM #[id number]. Topic: General Inquiry - Patient Advice  >> Aug 5, 2025  2:33 PM Patrice wrote:  Who Called: Virginia R Johnson    Caller is requesting assistance/information from provider's office.    Symptoms (please be specific):    How long has patient had these symptoms:    List of preferred pharmacies on file (remove unneeded): [unfilled]  If different, enter pharmacy into here including location and phone number:       Preferred Method of Contact: Phone Call  Patient's Preferred Phone Number on File: [phone]   Best Call Back Number, if different:  Additional Information: pt called to speak with staff stated need to get medical records from [facility] fax#[phone]

## 2025-08-06 ENCOUNTER — TELEPHONE (OUTPATIENT)
Dept: FAMILY MEDICINE | Facility: CLINIC | Age: 85
End: 2025-08-06
Payer: MEDICARE

## 2025-08-07 ENCOUNTER — TELEPHONE (OUTPATIENT)
Dept: FAMILY MEDICINE | Facility: CLINIC | Age: 85
End: 2025-08-07
Payer: MEDICARE

## 2025-08-07 NOTE — TELEPHONE ENCOUNTER
Copied from CRM #[id number]. Topic: Appointments - Appointment Confirmation  >> Aug 6, 2025  2:34 PM Daily wrote:  Who Called: Virginia R Johnson    Caller is requesting assistance/information from provider's office.    Symptoms (please be specific):    How long has patient had these symptoms:    List of preferred pharmacies on file (remove unneeded): [unfilled]  If different, enter pharmacy into here including location and phone number:       Preferred Method of Contact: Phone Call  Patient's Preferred Phone Number on File: [phone]   Best Call Back Number, if different:  Additional Information: Pt called to speak with the nurse about a appt question

## 2025-08-07 NOTE — TELEPHONE ENCOUNTER
Pt voiced that her daughter cannot come with her to the appt and requested if the daughter can be on the phone during the appointment. I voiced to be that should be fine and pt voiced thanks.

## 2025-08-13 ENCOUNTER — OFFICE VISIT (OUTPATIENT)
Dept: FAMILY MEDICINE | Facility: CLINIC | Age: 85
End: 2025-08-13
Payer: MEDICARE

## 2025-08-13 VITALS
SYSTOLIC BLOOD PRESSURE: 125 MMHG | DIASTOLIC BLOOD PRESSURE: 75 MMHG | HEIGHT: 65 IN | WEIGHT: 250 LBS | TEMPERATURE: 98 F | RESPIRATION RATE: 18 BRPM | HEART RATE: 84 BPM | BODY MASS INDEX: 41.65 KG/M2 | OXYGEN SATURATION: 99 %

## 2025-08-13 DIAGNOSIS — R06.2 WHEEZE: ICD-10-CM

## 2025-08-13 DIAGNOSIS — D64.9 ANEMIA, UNSPECIFIED TYPE: ICD-10-CM

## 2025-08-13 DIAGNOSIS — E79.0 HYPERURICEMIA: ICD-10-CM

## 2025-08-13 DIAGNOSIS — Z76.89 ENCOUNTER TO ESTABLISH CARE: ICD-10-CM

## 2025-08-13 DIAGNOSIS — F32.0 CURRENT MILD EPISODE OF MAJOR DEPRESSIVE DISORDER WITHOUT PRIOR EPISODE: ICD-10-CM

## 2025-08-13 DIAGNOSIS — K58.1 IRRITABLE BOWEL SYNDROME WITH CONSTIPATION: ICD-10-CM

## 2025-08-13 DIAGNOSIS — R79.9 ABNORMAL FINDING OF BLOOD CHEMISTRY, UNSPECIFIED: ICD-10-CM

## 2025-08-13 DIAGNOSIS — E66.813 CLASS 3 SEVERE OBESITY DUE TO EXCESS CALORIES WITHOUT SERIOUS COMORBIDITY WITH BODY MASS INDEX (BMI) OF 40.0 TO 44.9 IN ADULT: ICD-10-CM

## 2025-08-13 DIAGNOSIS — I48.11 LONGSTANDING PERSISTENT ATRIAL FIBRILLATION: ICD-10-CM

## 2025-08-13 DIAGNOSIS — I10 PRIMARY HYPERTENSION: ICD-10-CM

## 2025-08-13 DIAGNOSIS — M25.60 MORNING STIFFNESS OF JOINTS: ICD-10-CM

## 2025-08-13 DIAGNOSIS — E07.9 THYROID DISEASE: ICD-10-CM

## 2025-08-13 DIAGNOSIS — Z78.0 MENOPAUSE: ICD-10-CM

## 2025-08-13 DIAGNOSIS — K21.9 GASTROESOPHAGEAL REFLUX DISEASE WITHOUT ESOPHAGITIS: ICD-10-CM

## 2025-08-13 DIAGNOSIS — I73.9 PAD (PERIPHERAL ARTERY DISEASE): ICD-10-CM

## 2025-08-13 DIAGNOSIS — N32.81 OVERACTIVE BLADDER: ICD-10-CM

## 2025-08-13 DIAGNOSIS — I25.10 CORONARY ARTERY DISEASE INVOLVING NATIVE CORONARY ARTERY OF NATIVE HEART WITHOUT ANGINA PECTORIS: ICD-10-CM

## 2025-08-13 PROBLEM — K25.9 GASTRIC ULCER: Status: ACTIVE | Noted: 2025-08-13

## 2025-08-13 PROBLEM — I82.409 ACUTE EMBOLISM AND THROMBOSIS OF UNSPECIFIED DEEP VEINS OF UNSPECIFIED LOWER EXTREMITY: Status: ACTIVE | Noted: 2025-08-13

## 2025-08-13 PROBLEM — F32.9 MAJOR DEPRESSIVE DISORDER, SINGLE EPISODE, UNSPECIFIED: Status: ACTIVE | Noted: 2025-08-13

## 2025-08-13 PROBLEM — K58.9 IRRITABLE BOWEL: Status: ACTIVE | Noted: 2025-08-13

## 2025-08-13 PROBLEM — J45.909 ASTHMA: Status: ACTIVE | Noted: 2025-08-13

## 2025-08-13 PROBLEM — I48.91 UNSPECIFIED ATRIAL FIBRILLATION: Status: ACTIVE | Noted: 2025-08-13

## 2025-08-13 PROBLEM — I82.503 CHRONIC DEEP VEIN THROMBOSIS (DVT) OF BOTH LOWER EXTREMITIES: Status: ACTIVE | Noted: 2025-08-13

## 2025-08-13 RX ORDER — DULOXETIN HYDROCHLORIDE 30 MG/1
30 CAPSULE, DELAYED RELEASE ORAL DAILY
Qty: 30 CAPSULE | Refills: 11 | Status: SHIPPED | OUTPATIENT
Start: 2025-08-13 | End: 2026-08-13

## 2025-08-13 RX ORDER — ALBUTEROL SULFATE 90 UG/1
2 INHALANT RESPIRATORY (INHALATION) EVERY 6 HOURS PRN
Qty: 18 G | Refills: 2 | Status: SHIPPED | OUTPATIENT
Start: 2025-08-13 | End: 2025-09-12

## 2025-08-13 RX ORDER — MIRABEGRON 25 MG/1
25 TABLET, FILM COATED, EXTENDED RELEASE ORAL DAILY
Qty: 90 TABLET | Refills: 0 | Status: SHIPPED | OUTPATIENT
Start: 2025-08-13 | End: 2025-11-11

## 2025-08-20 ENCOUNTER — LAB VISIT (OUTPATIENT)
Dept: LAB | Facility: HOSPITAL | Age: 85
End: 2025-08-20
Attending: INTERNAL MEDICINE
Payer: MEDICARE

## 2025-08-20 DIAGNOSIS — R07.9 CHEST PAIN, UNSPECIFIED TYPE: ICD-10-CM

## 2025-08-20 DIAGNOSIS — M25.50 PAIN IN JOINT, MULTIPLE SITES: Primary | ICD-10-CM

## 2025-08-20 LAB
ALBUMIN SERPL-MCNC: 3.1 G/DL (ref 3.4–4.8)
ALBUMIN/GLOB SERPL: 0.8 RATIO (ref 1.1–2)
ALP SERPL-CCNC: 99 UNIT/L (ref 40–150)
ALT SERPL-CCNC: 15 UNIT/L (ref 0–55)
ANION GAP SERPL CALC-SCNC: 7 MEQ/L
AST SERPL-CCNC: 22 UNIT/L (ref 11–45)
BASOPHILS # BLD AUTO: 0.01 X10(3)/MCL
BASOPHILS NFR BLD AUTO: 0.2 %
BILIRUB DIRECT SERPL-MCNC: 0.2 MG/DL (ref 0–?)
BILIRUB SERPL-MCNC: 0.3 MG/DL
BUN SERPL-MCNC: 13.2 MG/DL (ref 9.8–20.1)
CALCIUM SERPL-MCNC: 9.2 MG/DL (ref 8.4–10.2)
CHLORIDE SERPL-SCNC: 106 MMOL/L (ref 98–107)
CK SERPL-CCNC: 206 U/L (ref 29–168)
CO2 SERPL-SCNC: 33 MMOL/L (ref 23–31)
CREAT SERPL-MCNC: 0.76 MG/DL (ref 0.55–1.02)
CREAT/UREA NIT SERPL: 17
CRP SERPL-MCNC: 3 MG/L
EOSINOPHIL # BLD AUTO: 0.09 X10(3)/MCL (ref 0–0.9)
EOSINOPHIL NFR BLD AUTO: 2.1 %
ERYTHROCYTE [DISTWIDTH] IN BLOOD BY AUTOMATED COUNT: 14.2 % (ref 11.5–17)
ERYTHROCYTE [SEDIMENTATION RATE] IN BLOOD: 29 MM/HR (ref 0–20)
GFR SERPLBLD CREATININE-BSD FMLA CKD-EPI: >60 ML/MIN/1.73/M2
GLOBULIN SER-MCNC: 4 GM/DL (ref 2.4–3.5)
GLUCOSE SERPL-MCNC: 100 MG/DL (ref 82–115)
HCT VFR BLD AUTO: 35.6 % (ref 37–47)
HGB BLD-MCNC: 11.3 G/DL (ref 12–16)
IMM GRANULOCYTES # BLD AUTO: 0.01 X10(3)/MCL (ref 0–0.04)
IMM GRANULOCYTES NFR BLD AUTO: 0.2 %
LYMPHOCYTES # BLD AUTO: 1.48 X10(3)/MCL (ref 0.6–4.6)
LYMPHOCYTES NFR BLD AUTO: 34 %
MCH RBC QN AUTO: 28.7 PG (ref 27–31)
MCHC RBC AUTO-ENTMCNC: 31.7 G/DL (ref 33–36)
MCV RBC AUTO: 90.4 FL (ref 80–94)
MONOCYTES # BLD AUTO: 0.59 X10(3)/MCL (ref 0.1–1.3)
MONOCYTES NFR BLD AUTO: 13.6 %
NEUTROPHILS # BLD AUTO: 2.17 X10(3)/MCL (ref 2.1–9.2)
NEUTROPHILS NFR BLD AUTO: 49.9 %
NRBC BLD AUTO-RTO: 0 %
PHOSPHATE SERPL-MCNC: 3.8 MG/DL (ref 2.3–4.7)
PLATELET # BLD AUTO: 226 X10(3)/MCL (ref 130–400)
PMV BLD AUTO: 8.7 FL (ref 7.4–10.4)
POTASSIUM SERPL-SCNC: 3.6 MMOL/L (ref 3.5–5.1)
PROT SERPL-MCNC: 7.1 GM/DL (ref 5.8–7.6)
RBC # BLD AUTO: 3.94 X10(6)/MCL (ref 4.2–5.4)
SODIUM SERPL-SCNC: 146 MMOL/L (ref 136–145)
URATE SERPL-MCNC: 3.9 MG/DL (ref 2.6–6)
WBC # BLD AUTO: 4.35 X10(3)/MCL (ref 4.5–11.5)

## 2025-08-20 PROCEDURE — 36415 COLL VENOUS BLD VENIPUNCTURE: CPT

## 2025-08-20 PROCEDURE — 86431 RHEUMATOID FACTOR QUANT: CPT

## 2025-08-20 PROCEDURE — 80053 COMPREHEN METABOLIC PANEL: CPT

## 2025-08-20 PROCEDURE — 84550 ASSAY OF BLOOD/URIC ACID: CPT

## 2025-08-20 PROCEDURE — 82550 ASSAY OF CK (CPK): CPT

## 2025-08-20 PROCEDURE — 84100 ASSAY OF PHOSPHORUS: CPT

## 2025-08-20 PROCEDURE — 82248 BILIRUBIN DIRECT: CPT

## 2025-08-20 PROCEDURE — 86140 C-REACTIVE PROTEIN: CPT

## 2025-08-20 PROCEDURE — 86200 CCP ANTIBODY: CPT

## 2025-08-20 PROCEDURE — 85652 RBC SED RATE AUTOMATED: CPT

## 2025-08-20 PROCEDURE — 85025 COMPLETE CBC W/AUTO DIFF WBC: CPT

## 2025-08-21 ENCOUNTER — HOSPITAL ENCOUNTER (OUTPATIENT)
Dept: RADIOLOGY | Facility: CLINIC | Age: 85
Discharge: HOME | End: 2025-08-21
Attending: ORTHOPAEDIC SURGERY
Payer: MEDICARE

## 2025-08-21 ENCOUNTER — OFFICE VISIT (OUTPATIENT)
Dept: ORTHOPEDICS | Facility: CLINIC | Age: 85
End: 2025-08-21
Payer: MEDICARE

## 2025-08-21 VITALS
BODY MASS INDEX: 41.99 KG/M2 | SYSTOLIC BLOOD PRESSURE: 145 MMHG | WEIGHT: 252 LBS | DIASTOLIC BLOOD PRESSURE: 75 MMHG | HEART RATE: 84 BPM | HEIGHT: 65 IN

## 2025-08-21 DIAGNOSIS — M16.12 PRIMARY OSTEOARTHRITIS OF LEFT HIP: ICD-10-CM

## 2025-08-21 DIAGNOSIS — M25.552 LEFT HIP PAIN: ICD-10-CM

## 2025-08-21 DIAGNOSIS — M25.552 LEFT HIP PAIN: Primary | ICD-10-CM

## 2025-08-21 PROCEDURE — 73502 X-RAY EXAM HIP UNI 2-3 VIEWS: CPT | Mod: LT,,, | Performed by: ORTHOPAEDIC SURGERY

## 2025-08-22 ENCOUNTER — HOSPITAL ENCOUNTER (OUTPATIENT)
Dept: RADIOLOGY | Facility: HOSPITAL | Age: 85
Discharge: HOME | DRG: 563 | End: 2025-08-22
Attending: STUDENT IN AN ORGANIZED HEALTH CARE EDUCATION/TRAINING PROGRAM
Payer: MEDICARE

## 2025-08-22 DIAGNOSIS — Z78.0 MENOPAUSE: ICD-10-CM

## 2025-08-22 DIAGNOSIS — E66.813 CLASS 3 SEVERE OBESITY DUE TO EXCESS CALORIES WITHOUT SERIOUS COMORBIDITY WITH BODY MASS INDEX (BMI) OF 40.0 TO 44.9 IN ADULT: ICD-10-CM

## 2025-08-22 PROCEDURE — 77080 DXA BONE DENSITY AXIAL: CPT | Mod: TC

## 2025-08-23 LAB
C3 SERPL-MCNC: 147 MG/DL
C4 SERPL-MCNC: 25 MG/DL
NUCLEAR IGG SER QL IA: ABNORMAL
RHEUMATOID FACT SERPL-ACNC: 21 IU/ML

## 2025-08-25 ENCOUNTER — TELEPHONE (OUTPATIENT)
Dept: FAMILY MEDICINE | Facility: CLINIC | Age: 85
End: 2025-08-25
Payer: MEDICARE

## 2025-08-25 ENCOUNTER — HOSPITAL ENCOUNTER (INPATIENT)
Facility: HOSPITAL | Age: 85
LOS: 1 days | Discharge: NURSING, REHAB OR LONG-TERM CARE | DRG: 563 | End: 2025-08-26
Attending: STUDENT IN AN ORGANIZED HEALTH CARE EDUCATION/TRAINING PROGRAM | Admitting: STUDENT IN AN ORGANIZED HEALTH CARE EDUCATION/TRAINING PROGRAM
Payer: MEDICARE
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
## 2025-08-25 LAB — CYCLIC CITRULLINATED PEPTIDE (CCP) (OHS): 1.1 U/ML

## 2025-08-26 PROBLEM — S42.301A CLOSED FRACTURE OF SHAFT OF RIGHT HUMERUS: Status: ACTIVE | Noted: 2025-08-26

## (undated) DEVICE — KIT WING PAD POSITIONING

## (undated) DEVICE — GUIDEWIRE INQWIRE SE 3MM JTIP

## (undated) DEVICE — TRAY SKIN SCRUB WET PREMIUM

## (undated) DEVICE — SCISSOR 5MMX35CM DIRECT DRIVE

## (undated) DEVICE — POWDER ARISTA AH 3G

## (undated) DEVICE — KIT SYR REUSABLE

## (undated) DEVICE — TUBING HP AIRLSS ROT ADPT 30IN

## (undated) DEVICE — CANNULA ADULT NASAL 7FT

## (undated) DEVICE — SUT CHROMIC GUT 2-0 CT-1 27IN

## (undated) DEVICE — TRAY DO THE ROBOT

## (undated) DEVICE — STOPCOCK 3-WAY

## (undated) DEVICE — DRAPE ARM DAVINCI XI

## (undated) DEVICE — Device

## (undated) DEVICE — SEE MEDLINE ITEM 146292

## (undated) DEVICE — UNDERGLOVES BIOGEL PI SZ 7 LF

## (undated) DEVICE — JELLY SURGILUBE LUBE TUBE 2OZ

## (undated) DEVICE — HEMOSTAT VASC BAND REG 24CM

## (undated) DEVICE — SYS SEE SHARP SCP ANTIFG LNG

## (undated) DEVICE — ELECTRODE PATIENT RETURN DISP

## (undated) DEVICE — JELLY SURGILUBE 5GR

## (undated) DEVICE — SOL NS 1000CC

## (undated) DEVICE — GLOVE PROTEXIS NEOPRENE 7.5

## (undated) DEVICE — SOL NACL IRR 1000ML BTL

## (undated) DEVICE — PAD PREP 50/CA

## (undated) DEVICE — IRRIGATOR ENDOSCOPY DISP.

## (undated) DEVICE — DRAPE TOP 53X102IN

## (undated) DEVICE — SOL ELECTROLUBE ANTI-STIC

## (undated) DEVICE — OBTURATOR BLADELESS 8MM XI CLR

## (undated) DEVICE — SYS WASTE FLD DISPOSAL 1400ML

## (undated) DEVICE — GAUZE VISTEC XR DTECT 16 4X4IN

## (undated) DEVICE — GLOVE BIOGEL SKINSENSE PI 7.0

## (undated) DEVICE — SCRUB 10% POVIDONE IODINE 4OZ

## (undated) DEVICE — PAD SANITARY HVY ABSRB UNSCNTD

## (undated) DEVICE — GUIDE LAUNCHER 6FR JR 4.0

## (undated) DEVICE — PAD DEFIB CADENCE ADULT R2

## (undated) DEVICE — VACURETTE 8MM CURVED

## (undated) DEVICE — PORT ACCESS 8MM W/120MM LOW

## (undated) DEVICE — DRAPE COLUMN DAVINCI XI

## (undated) DEVICE — NDL SPINAL 20GX3.5 HUB

## (undated) DEVICE — KIT MANIFOLD LOW PRESS TUBING

## (undated) DEVICE — SET TRI-LUMEN FILTERED TUBE

## (undated) DEVICE — KIT GLIDESHEATH NIT 6FR 10CM

## (undated) DEVICE — NDL PNEUMOPERITONEUM 150MM

## (undated) DEVICE — INSERT CUSHIONPRONE VIEW LARGE

## (undated) DEVICE — SEAL UNIVERSAL 5MM-8MM XI

## (undated) DEVICE — SYR 10CC LUER LOCK

## (undated) DEVICE — DEVICE ANC SW STAT FOLEY 6-24

## (undated) DEVICE — PENCIL ELECSURG ROCKER 15FT

## (undated) DEVICE — SUT MCRYL PLUS 4-0 PS2 27IN

## (undated) DEVICE — COVER TIP CURVED SCISSORS XI

## (undated) DEVICE — CATH AL 1.0 5/BX

## (undated) DEVICE — CATH RED RUBBER LATEX 14F 16IN

## (undated) DEVICE — LEGGING SURG CONV 43X28IN

## (undated) DEVICE — SUT PDS II O CT-2 VIL MONO

## (undated) DEVICE — VALVE CONTROL COPILOT

## (undated) DEVICE — TOWEL OR DISP STRL BLUE 4/PK

## (undated) DEVICE — MANIPULATOR VCARE PLUS 34MM

## (undated) DEVICE — GLOVE PROTEXIS LTX MICRO  7.5

## (undated) DEVICE — SOL WATER STRL IRR 1000ML

## (undated) DEVICE — KIT SURGIFLO HEMOSTATIC MATRIX

## (undated) DEVICE — CATH IMPULSE 5F 100CM FR4

## (undated) DEVICE — CATH JACKY RADIAL 5FR 100CM

## (undated) DEVICE — TUBE SUCTION MEDI-VAC STERILE

## (undated) DEVICE — LINER SUC LID CANSTR 1500ML

## (undated) DEVICE — KIT SURGICAL TURNOVER

## (undated) DEVICE — NDL INSUFFLATION VERRES 120MM

## (undated) DEVICE — ELECTRODE REM PLYHSV RETURN 9

## (undated) DEVICE — KIT LITHOTOMY MINOR LAFAYETTE

## (undated) DEVICE — GOWN NONREINF SET-IN SLV XL

## (undated) DEVICE — APPLICATOR ENDOSCOPIC

## (undated) DEVICE — CATH FL 3.5 5FR

## (undated) DEVICE — KIT HAND CONTROL HIGH PRESSUR

## (undated) DEVICE — SOL PVP-I SCRUB 7.5% 4OZ